# Patient Record
Sex: FEMALE | Race: WHITE | Employment: OTHER | URBAN - METROPOLITAN AREA
[De-identification: names, ages, dates, MRNs, and addresses within clinical notes are randomized per-mention and may not be internally consistent; named-entity substitution may affect disease eponyms.]

---

## 2017-01-17 RX ORDER — ALPRAZOLAM 0.5 MG/1
TABLET ORAL
Qty: 270 TABLET | Refills: 0 | OUTPATIENT
Start: 2017-01-17 | End: 2017-04-25 | Stop reason: SDUPTHER

## 2017-01-24 ENCOUNTER — OFFICE VISIT (OUTPATIENT)
Dept: FAMILY MEDICINE CLINIC | Age: 77
End: 2017-01-24

## 2017-01-24 VITALS
BODY MASS INDEX: 22.63 KG/M2 | HEIGHT: 62 IN | SYSTOLIC BLOOD PRESSURE: 116 MMHG | RESPIRATION RATE: 16 BRPM | DIASTOLIC BLOOD PRESSURE: 78 MMHG | WEIGHT: 123 LBS | TEMPERATURE: 98.1 F | HEART RATE: 58 BPM

## 2017-01-24 DIAGNOSIS — E55.9 VITAMIN D DEFICIENCY: ICD-10-CM

## 2017-01-24 DIAGNOSIS — Z12.11 COLON CANCER SCREENING: ICD-10-CM

## 2017-01-24 DIAGNOSIS — I25.10 CORONARY ARTERY DISEASE INVOLVING NATIVE CORONARY ARTERY OF NATIVE HEART WITHOUT ANGINA PECTORIS: Primary | ICD-10-CM

## 2017-01-24 DIAGNOSIS — R73.9 HYPERGLYCEMIA: ICD-10-CM

## 2017-01-24 DIAGNOSIS — Z72.0 TOBACCO ABUSE: ICD-10-CM

## 2017-01-24 DIAGNOSIS — E78.00 HYPERCHOLESTEROLEMIA: ICD-10-CM

## 2017-01-24 DIAGNOSIS — F41.9 ANXIETY: ICD-10-CM

## 2017-01-24 DIAGNOSIS — I10 ESSENTIAL HYPERTENSION: ICD-10-CM

## 2017-01-24 PROCEDURE — 4040F PNEUMOC VAC/ADMIN/RCVD: CPT | Performed by: FAMILY MEDICINE

## 2017-01-24 PROCEDURE — 3017F COLORECTAL CA SCREEN DOC REV: CPT | Performed by: FAMILY MEDICINE

## 2017-01-24 PROCEDURE — 1090F PRES/ABSN URINE INCON ASSESS: CPT | Performed by: FAMILY MEDICINE

## 2017-01-24 PROCEDURE — G8419 CALC BMI OUT NRM PARAM NOF/U: HCPCS | Performed by: FAMILY MEDICINE

## 2017-01-24 PROCEDURE — 1123F ACP DISCUSS/DSCN MKR DOCD: CPT | Performed by: FAMILY MEDICINE

## 2017-01-24 PROCEDURE — G8399 PT W/DXA RESULTS DOCUMENT: HCPCS | Performed by: FAMILY MEDICINE

## 2017-01-24 PROCEDURE — G8599 NO ASA/ANTIPLAT THER USE RNG: HCPCS | Performed by: FAMILY MEDICINE

## 2017-01-24 PROCEDURE — 99214 OFFICE O/P EST MOD 30 MIN: CPT | Performed by: FAMILY MEDICINE

## 2017-01-24 PROCEDURE — G8484 FLU IMMUNIZE NO ADMIN: HCPCS | Performed by: FAMILY MEDICINE

## 2017-01-24 PROCEDURE — G8427 DOCREV CUR MEDS BY ELIG CLIN: HCPCS | Performed by: FAMILY MEDICINE

## 2017-01-24 PROCEDURE — 4004F PT TOBACCO SCREEN RCVD TLK: CPT | Performed by: FAMILY MEDICINE

## 2017-01-24 RX ORDER — PAROXETINE HYDROCHLORIDE 40 MG/1
TABLET, FILM COATED ORAL
COMMUNITY
Start: 2017-01-07 | End: 2017-04-25 | Stop reason: SDUPTHER

## 2017-01-24 ASSESSMENT — PATIENT HEALTH QUESTIONNAIRE - PHQ9
2. FEELING DOWN, DEPRESSED OR HOPELESS: 0
SUM OF ALL RESPONSES TO PHQ QUESTIONS 1-9: 0
1. LITTLE INTEREST OR PLEASURE IN DOING THINGS: 0
SUM OF ALL RESPONSES TO PHQ9 QUESTIONS 1 & 2: 0

## 2017-02-07 DIAGNOSIS — R73.9 HYPERGLYCEMIA: ICD-10-CM

## 2017-02-07 LAB — HBA1C MFR BLD: 6.3 % (ref 4.8–5.9)

## 2017-04-10 RX ORDER — METHOCARBAMOL 750 MG/1
TABLET ORAL
Qty: 12 CAPSULE | Refills: 3 | Status: SHIPPED | OUTPATIENT
Start: 2017-04-10 | End: 2018-04-20 | Stop reason: SDUPTHER

## 2017-04-25 ENCOUNTER — OFFICE VISIT (OUTPATIENT)
Dept: FAMILY MEDICINE CLINIC | Age: 77
End: 2017-04-25

## 2017-04-25 VITALS
RESPIRATION RATE: 18 BRPM | BODY MASS INDEX: 23.3 KG/M2 | HEIGHT: 62 IN | DIASTOLIC BLOOD PRESSURE: 72 MMHG | TEMPERATURE: 97.7 F | HEART RATE: 56 BPM | SYSTOLIC BLOOD PRESSURE: 120 MMHG | WEIGHT: 126.6 LBS

## 2017-04-25 DIAGNOSIS — E78.00 HYPERCHOLESTEROLEMIA: ICD-10-CM

## 2017-04-25 DIAGNOSIS — Z72.0 TOBACCO ABUSE: ICD-10-CM

## 2017-04-25 DIAGNOSIS — R73.9 HYPERGLYCEMIA: ICD-10-CM

## 2017-04-25 DIAGNOSIS — I10 ESSENTIAL HYPERTENSION: Primary | ICD-10-CM

## 2017-04-25 DIAGNOSIS — Z12.11 SCREENING FOR MALIGNANT NEOPLASM OF COLON: ICD-10-CM

## 2017-04-25 DIAGNOSIS — F41.9 ANXIETY: ICD-10-CM

## 2017-04-25 DIAGNOSIS — I25.10 CORONARY ARTERY DISEASE INVOLVING NATIVE CORONARY ARTERY OF NATIVE HEART WITHOUT ANGINA PECTORIS: ICD-10-CM

## 2017-04-25 DIAGNOSIS — E55.9 VITAMIN D DEFICIENCY: ICD-10-CM

## 2017-04-25 PROCEDURE — 4004F PT TOBACCO SCREEN RCVD TLK: CPT | Performed by: FAMILY MEDICINE

## 2017-04-25 PROCEDURE — 4040F PNEUMOC VAC/ADMIN/RCVD: CPT | Performed by: FAMILY MEDICINE

## 2017-04-25 PROCEDURE — G8420 CALC BMI NORM PARAMETERS: HCPCS | Performed by: FAMILY MEDICINE

## 2017-04-25 PROCEDURE — 1090F PRES/ABSN URINE INCON ASSESS: CPT | Performed by: FAMILY MEDICINE

## 2017-04-25 PROCEDURE — 1123F ACP DISCUSS/DSCN MKR DOCD: CPT | Performed by: FAMILY MEDICINE

## 2017-04-25 PROCEDURE — G8399 PT W/DXA RESULTS DOCUMENT: HCPCS | Performed by: FAMILY MEDICINE

## 2017-04-25 PROCEDURE — 3017F COLORECTAL CA SCREEN DOC REV: CPT | Performed by: FAMILY MEDICINE

## 2017-04-25 PROCEDURE — G8599 NO ASA/ANTIPLAT THER USE RNG: HCPCS | Performed by: FAMILY MEDICINE

## 2017-04-25 PROCEDURE — G8427 DOCREV CUR MEDS BY ELIG CLIN: HCPCS | Performed by: FAMILY MEDICINE

## 2017-04-25 PROCEDURE — 99214 OFFICE O/P EST MOD 30 MIN: CPT | Performed by: FAMILY MEDICINE

## 2017-04-25 RX ORDER — PAROXETINE HYDROCHLORIDE 40 MG/1
40 TABLET, FILM COATED ORAL EVERY MORNING
Qty: 90 TABLET | Refills: 0 | Status: SHIPPED | OUTPATIENT
Start: 2017-04-25 | End: 2017-10-20 | Stop reason: SDUPTHER

## 2017-04-25 RX ORDER — ALPRAZOLAM 0.5 MG/1
0.5 TABLET ORAL 3 TIMES DAILY PRN
Qty: 270 TABLET | Refills: 0 | Status: SHIPPED | OUTPATIENT
Start: 2017-04-25 | End: 2017-07-20 | Stop reason: SDUPTHER

## 2017-04-26 RX ORDER — ALPRAZOLAM 0.5 MG/1
TABLET ORAL
Qty: 270 TABLET | Refills: 0 | OUTPATIENT
Start: 2017-04-26

## 2017-05-02 LAB
CONTROL: ABNORMAL
HEMOCCULT STL QL: POSITIVE

## 2017-05-18 ENCOUNTER — OFFICE VISIT (OUTPATIENT)
Dept: FAMILY MEDICINE CLINIC | Age: 77
End: 2017-05-18

## 2017-05-18 VITALS
RESPIRATION RATE: 16 BRPM | SYSTOLIC BLOOD PRESSURE: 118 MMHG | WEIGHT: 121.2 LBS | HEART RATE: 74 BPM | TEMPERATURE: 97.5 F | HEIGHT: 62 IN | BODY MASS INDEX: 22.31 KG/M2 | DIASTOLIC BLOOD PRESSURE: 68 MMHG

## 2017-05-18 DIAGNOSIS — I10 ESSENTIAL HYPERTENSION: ICD-10-CM

## 2017-05-18 DIAGNOSIS — Z72.0 TOBACCO ABUSE: ICD-10-CM

## 2017-05-18 DIAGNOSIS — F41.9 ANXIETY: ICD-10-CM

## 2017-05-18 DIAGNOSIS — E78.00 HYPERCHOLESTEROLEMIA: ICD-10-CM

## 2017-05-18 DIAGNOSIS — I25.10 CORONARY ARTERY DISEASE INVOLVING NATIVE CORONARY ARTERY OF NATIVE HEART WITHOUT ANGINA PECTORIS: ICD-10-CM

## 2017-05-18 DIAGNOSIS — K92.2 GASTROINTESTINAL HEMORRHAGE, UNSPECIFIED GASTROINTESTINAL HEMORRHAGE TYPE: Primary | ICD-10-CM

## 2017-05-18 DIAGNOSIS — R73.9 HYPERGLYCEMIA: ICD-10-CM

## 2017-05-18 DIAGNOSIS — E55.9 VITAMIN D DEFICIENCY: ICD-10-CM

## 2017-05-18 PROCEDURE — 4004F PT TOBACCO SCREEN RCVD TLK: CPT | Performed by: FAMILY MEDICINE

## 2017-05-18 PROCEDURE — 1090F PRES/ABSN URINE INCON ASSESS: CPT | Performed by: FAMILY MEDICINE

## 2017-05-18 PROCEDURE — 99214 OFFICE O/P EST MOD 30 MIN: CPT | Performed by: FAMILY MEDICINE

## 2017-05-18 PROCEDURE — 3017F COLORECTAL CA SCREEN DOC REV: CPT | Performed by: FAMILY MEDICINE

## 2017-05-18 PROCEDURE — G8419 CALC BMI OUT NRM PARAM NOF/U: HCPCS | Performed by: FAMILY MEDICINE

## 2017-05-18 PROCEDURE — G8599 NO ASA/ANTIPLAT THER USE RNG: HCPCS | Performed by: FAMILY MEDICINE

## 2017-05-18 PROCEDURE — G8427 DOCREV CUR MEDS BY ELIG CLIN: HCPCS | Performed by: FAMILY MEDICINE

## 2017-05-18 PROCEDURE — G8399 PT W/DXA RESULTS DOCUMENT: HCPCS | Performed by: FAMILY MEDICINE

## 2017-05-18 PROCEDURE — 1123F ACP DISCUSS/DSCN MKR DOCD: CPT | Performed by: FAMILY MEDICINE

## 2017-05-18 PROCEDURE — 4040F PNEUMOC VAC/ADMIN/RCVD: CPT | Performed by: FAMILY MEDICINE

## 2017-05-18 RX ORDER — LOSARTAN POTASSIUM 25 MG/1
1 TABLET ORAL DAILY
COMMUNITY
Start: 2017-05-16 | End: 2018-05-08 | Stop reason: SDUPTHER

## 2017-06-08 ENCOUNTER — ANESTHESIA EVENT (OUTPATIENT)
Dept: ENDOSCOPY | Age: 77
End: 2017-06-08
Payer: MEDICARE

## 2017-06-08 ENCOUNTER — ANESTHESIA (OUTPATIENT)
Dept: ENDOSCOPY | Age: 77
End: 2017-06-08
Payer: MEDICARE

## 2017-06-08 ENCOUNTER — HOSPITAL ENCOUNTER (OUTPATIENT)
Age: 77
Setting detail: OUTPATIENT SURGERY
Discharge: HOME | End: 2017-06-08
Attending: SPECIALIST | Admitting: SPECIALIST
Payer: MEDICARE

## 2017-06-08 VITALS
DIASTOLIC BLOOD PRESSURE: 78 MMHG | OXYGEN SATURATION: 93 % | BODY MASS INDEX: 23.98 KG/M2 | WEIGHT: 127 LBS | TEMPERATURE: 98 F | SYSTOLIC BLOOD PRESSURE: 131 MMHG | HEIGHT: 61 IN | RESPIRATION RATE: 18 BRPM | HEART RATE: 79 BPM

## 2017-06-08 VITALS
RESPIRATION RATE: 19 BRPM | SYSTOLIC BLOOD PRESSURE: 119 MMHG | OXYGEN SATURATION: 91 % | DIASTOLIC BLOOD PRESSURE: 59 MMHG

## 2017-06-08 PROCEDURE — 6360000002 HC RX W HCPCS: Performed by: NURSE ANESTHETIST, CERTIFIED REGISTERED

## 2017-06-08 PROCEDURE — 88305 TISSUE EXAM BY PATHOLOGIST: CPT

## 2017-06-08 PROCEDURE — 2500000003 HC RX 250 WO HCPCS: Performed by: NURSE ANESTHETIST, CERTIFIED REGISTERED

## 2017-06-08 PROCEDURE — 2580000003 HC RX 258: Performed by: SPECIALIST

## 2017-06-08 PROCEDURE — 3700000001 HC ADD 15 MINUTES (ANESTHESIA): Performed by: SPECIALIST

## 2017-06-08 PROCEDURE — 88342 IMHCHEM/IMCYTCHM 1ST ANTB: CPT

## 2017-06-08 PROCEDURE — 3609027000 HC COLONOSCOPY: Performed by: SPECIALIST

## 2017-06-08 PROCEDURE — 3609017100 HC EGD: Performed by: SPECIALIST

## 2017-06-08 PROCEDURE — 3700000000 HC ANESTHESIA ATTENDED CARE: Performed by: SPECIALIST

## 2017-06-08 PROCEDURE — 7100000011 HC PHASE II RECOVERY - ADDTL 15 MIN: Performed by: SPECIALIST

## 2017-06-08 PROCEDURE — 7100000010 HC PHASE II RECOVERY - FIRST 15 MIN: Performed by: SPECIALIST

## 2017-06-08 RX ORDER — SODIUM CHLORIDE 0.9 % (FLUSH) 0.9 %
10 SYRINGE (ML) INJECTION EVERY 12 HOURS SCHEDULED
Status: DISCONTINUED | OUTPATIENT
Start: 2017-06-08 | End: 2017-06-08 | Stop reason: HOSPADM

## 2017-06-08 RX ORDER — ONDANSETRON 2 MG/ML
4 INJECTION INTRAMUSCULAR; INTRAVENOUS
Status: DISCONTINUED | OUTPATIENT
Start: 2017-06-08 | End: 2017-06-08 | Stop reason: HOSPADM

## 2017-06-08 RX ORDER — GLYCOPYRROLATE 0.2 MG/ML
INJECTION INTRAMUSCULAR; INTRAVENOUS PRN
Status: DISCONTINUED | OUTPATIENT
Start: 2017-06-08 | End: 2017-06-08 | Stop reason: SDUPTHER

## 2017-06-08 RX ORDER — SODIUM CHLORIDE 0.9 % (FLUSH) 0.9 %
10 SYRINGE (ML) INJECTION PRN
Status: DISCONTINUED | OUTPATIENT
Start: 2017-06-08 | End: 2017-06-08 | Stop reason: HOSPADM

## 2017-06-08 RX ORDER — LIDOCAINE HYDROCHLORIDE 10 MG/ML
INJECTION, SOLUTION INFILTRATION; PERINEURAL PRN
Status: DISCONTINUED | OUTPATIENT
Start: 2017-06-08 | End: 2017-06-08 | Stop reason: SDUPTHER

## 2017-06-08 RX ORDER — PROPOFOL 10 MG/ML
INJECTION, EMULSION INTRAVENOUS PRN
Status: DISCONTINUED | OUTPATIENT
Start: 2017-06-08 | End: 2017-06-08 | Stop reason: SDUPTHER

## 2017-06-08 RX ORDER — SODIUM CHLORIDE 9 MG/ML
INJECTION, SOLUTION INTRAVENOUS CONTINUOUS
Status: DISCONTINUED | OUTPATIENT
Start: 2017-06-08 | End: 2017-06-08 | Stop reason: HOSPADM

## 2017-06-08 RX ORDER — LIDOCAINE HYDROCHLORIDE 10 MG/ML
1 INJECTION, SOLUTION EPIDURAL; INFILTRATION; INTRACAUDAL; PERINEURAL
Status: DISCONTINUED | OUTPATIENT
Start: 2017-06-08 | End: 2017-06-08 | Stop reason: HOSPADM

## 2017-06-08 RX ADMIN — SODIUM CHLORIDE: 9 INJECTION, SOLUTION INTRAVENOUS at 10:31

## 2017-06-08 RX ADMIN — PROPOFOL 10 MG: 10 INJECTION, EMULSION INTRAVENOUS at 11:02

## 2017-06-08 RX ADMIN — LIDOCAINE HYDROCHLORIDE 20 MG: 10 INJECTION, SOLUTION INFILTRATION; PERINEURAL at 10:41

## 2017-06-08 RX ADMIN — PROPOFOL 20 MG: 10 INJECTION, EMULSION INTRAVENOUS at 10:47

## 2017-06-08 RX ADMIN — PROPOFOL 20 MG: 10 INJECTION, EMULSION INTRAVENOUS at 10:49

## 2017-06-08 RX ADMIN — PROPOFOL 10 MG: 10 INJECTION, EMULSION INTRAVENOUS at 10:58

## 2017-06-08 RX ADMIN — GLYCOPYRROLATE 0.2 MG: 0.2 INJECTION INTRAMUSCULAR; INTRAVENOUS at 10:53

## 2017-06-08 RX ADMIN — PROPOFOL 10 MG: 10 INJECTION, EMULSION INTRAVENOUS at 11:05

## 2017-06-08 RX ADMIN — PROPOFOL 20 MG: 10 INJECTION, EMULSION INTRAVENOUS at 10:43

## 2017-06-08 RX ADMIN — Medication 0.1 MG: at 10:55

## 2017-06-08 RX ADMIN — PROPOFOL 20 MG: 10 INJECTION, EMULSION INTRAVENOUS at 10:45

## 2017-06-08 RX ADMIN — PROPOFOL 30 MG: 10 INJECTION, EMULSION INTRAVENOUS at 10:41

## 2017-06-08 RX ADMIN — PROPOFOL 10 MG: 10 INJECTION, EMULSION INTRAVENOUS at 10:53

## 2017-06-08 RX ADMIN — Medication 0.1 MG: at 10:50

## 2017-06-08 RX ADMIN — PROPOFOL 20 MG: 10 INJECTION, EMULSION INTRAVENOUS at 10:51

## 2017-07-03 ENCOUNTER — TELEPHONE (OUTPATIENT)
Dept: FAMILY MEDICINE CLINIC | Age: 77
End: 2017-07-03

## 2017-07-20 ENCOUNTER — OFFICE VISIT (OUTPATIENT)
Dept: FAMILY MEDICINE CLINIC | Age: 77
End: 2017-07-20

## 2017-07-20 VITALS
WEIGHT: 121 LBS | RESPIRATION RATE: 14 BRPM | DIASTOLIC BLOOD PRESSURE: 72 MMHG | HEIGHT: 61 IN | SYSTOLIC BLOOD PRESSURE: 130 MMHG | BODY MASS INDEX: 22.84 KG/M2 | HEART RATE: 60 BPM | TEMPERATURE: 97.4 F

## 2017-07-20 DIAGNOSIS — M79.10 MYALGIA: Primary | ICD-10-CM

## 2017-07-20 DIAGNOSIS — M25.50 ARTHRALGIA, UNSPECIFIED JOINT: ICD-10-CM

## 2017-07-20 DIAGNOSIS — E78.00 HYPERCHOLESTEROLEMIA: ICD-10-CM

## 2017-07-20 DIAGNOSIS — R53.83 OTHER MALAISE AND FATIGUE: ICD-10-CM

## 2017-07-20 DIAGNOSIS — I25.10 CORONARY ARTERY DISEASE INVOLVING NATIVE CORONARY ARTERY OF NATIVE HEART WITHOUT ANGINA PECTORIS: ICD-10-CM

## 2017-07-20 DIAGNOSIS — F41.9 ANXIETY: ICD-10-CM

## 2017-07-20 DIAGNOSIS — E55.9 VITAMIN D DEFICIENCY: ICD-10-CM

## 2017-07-20 DIAGNOSIS — Z72.0 TOBACCO ABUSE: ICD-10-CM

## 2017-07-20 DIAGNOSIS — M79.10 MYALGIA: ICD-10-CM

## 2017-07-20 DIAGNOSIS — I10 ESSENTIAL HYPERTENSION: ICD-10-CM

## 2017-07-20 DIAGNOSIS — R53.81 OTHER MALAISE AND FATIGUE: ICD-10-CM

## 2017-07-20 LAB
ALBUMIN SERPL-MCNC: 4.8 G/DL (ref 3.9–4.9)
ALP BLD-CCNC: 90 U/L (ref 40–130)
ALT SERPL-CCNC: 28 U/L (ref 0–33)
ANION GAP SERPL CALCULATED.3IONS-SCNC: 16 MEQ/L (ref 7–13)
ANTISTREPTOLYSIN-O: 34 IU/ML (ref 0–200)
AST SERPL-CCNC: 25 U/L (ref 0–35)
BILIRUB SERPL-MCNC: 0.5 MG/DL (ref 0–1.2)
BUN BLDV-MCNC: 18 MG/DL (ref 8–23)
C-REACTIVE PROTEIN: 2.4 MG/L (ref 0–5)
CALCIUM SERPL-MCNC: 10.1 MG/DL (ref 8.6–10.2)
CHLORIDE BLD-SCNC: 101 MEQ/L (ref 98–107)
CO2: 22 MEQ/L (ref 22–29)
CREAT SERPL-MCNC: 0.79 MG/DL (ref 0.5–0.9)
GFR AFRICAN AMERICAN: >60
GFR NON-AFRICAN AMERICAN: >60
GLOBULIN: 2.7 G/DL (ref 2.3–3.5)
GLUCOSE BLD-MCNC: 118 MG/DL (ref 74–109)
POTASSIUM SERPL-SCNC: 4.9 MEQ/L (ref 3.5–5.1)
RHEUMATOID FACTOR: <10 IU/ML (ref 0–14)
SODIUM BLD-SCNC: 139 MEQ/L (ref 132–144)
TOTAL PROTEIN: 7.5 G/DL (ref 6.4–8.1)
TSH REFLEX: 0.59 UIU/ML (ref 0.27–4.2)
URIC ACID, SERUM: 6 MG/DL (ref 2.4–5.7)

## 2017-07-20 PROCEDURE — G8420 CALC BMI NORM PARAMETERS: HCPCS | Performed by: FAMILY MEDICINE

## 2017-07-20 PROCEDURE — 4040F PNEUMOC VAC/ADMIN/RCVD: CPT | Performed by: FAMILY MEDICINE

## 2017-07-20 PROCEDURE — 1123F ACP DISCUSS/DSCN MKR DOCD: CPT | Performed by: FAMILY MEDICINE

## 2017-07-20 PROCEDURE — G8427 DOCREV CUR MEDS BY ELIG CLIN: HCPCS | Performed by: FAMILY MEDICINE

## 2017-07-20 PROCEDURE — 99214 OFFICE O/P EST MOD 30 MIN: CPT | Performed by: FAMILY MEDICINE

## 2017-07-20 PROCEDURE — 1090F PRES/ABSN URINE INCON ASSESS: CPT | Performed by: FAMILY MEDICINE

## 2017-07-20 PROCEDURE — G8399 PT W/DXA RESULTS DOCUMENT: HCPCS | Performed by: FAMILY MEDICINE

## 2017-07-20 PROCEDURE — G8599 NO ASA/ANTIPLAT THER USE RNG: HCPCS | Performed by: FAMILY MEDICINE

## 2017-07-20 PROCEDURE — 3017F COLORECTAL CA SCREEN DOC REV: CPT | Performed by: FAMILY MEDICINE

## 2017-07-20 PROCEDURE — 4004F PT TOBACCO SCREEN RCVD TLK: CPT | Performed by: FAMILY MEDICINE

## 2017-07-20 RX ORDER — ALPRAZOLAM 0.5 MG/1
0.5 TABLET ORAL 3 TIMES DAILY PRN
Qty: 270 TABLET | Refills: 0 | Status: SHIPPED | OUTPATIENT
Start: 2017-07-20 | End: 2017-10-20 | Stop reason: SDUPTHER

## 2017-07-20 RX ORDER — PANTOPRAZOLE SODIUM 40 MG/1
40 TABLET, DELAYED RELEASE ORAL DAILY
COMMUNITY
End: 2019-04-01 | Stop reason: SDUPTHER

## 2017-07-21 LAB
ANA INTERPRETATION: ABNORMAL
ANA PATTERN: ABNORMAL
ANA TITER: ABNORMAL
ANTI-NUCLEAR ANTIBODY (ANA): POSITIVE

## 2017-07-24 RX ORDER — SIMVASTATIN 20 MG
TABLET ORAL
Qty: 90 TABLET | Refills: 1 | Status: SHIPPED | OUTPATIENT
Start: 2017-07-24 | End: 2017-10-11 | Stop reason: SDUPTHER

## 2017-07-28 DIAGNOSIS — R76.8 POSITIVE ANA (ANTINUCLEAR ANTIBODY): Primary | ICD-10-CM

## 2017-07-28 DIAGNOSIS — E79.0 ELEVATED URIC ACID IN BLOOD: ICD-10-CM

## 2017-08-02 ENCOUNTER — TELEPHONE (OUTPATIENT)
Dept: FAMILY MEDICINE CLINIC | Age: 77
End: 2017-08-02

## 2017-10-11 RX ORDER — SIMVASTATIN 20 MG
TABLET ORAL
Qty: 90 TABLET | Refills: 1 | Status: SHIPPED | OUTPATIENT
Start: 2017-10-11 | End: 2018-04-04 | Stop reason: SDUPTHER

## 2017-10-20 ENCOUNTER — OFFICE VISIT (OUTPATIENT)
Dept: FAMILY MEDICINE CLINIC | Age: 77
End: 2017-10-20

## 2017-10-20 VITALS
RESPIRATION RATE: 16 BRPM | OXYGEN SATURATION: 95 % | HEIGHT: 61 IN | TEMPERATURE: 97 F | HEART RATE: 66 BPM | DIASTOLIC BLOOD PRESSURE: 62 MMHG | WEIGHT: 125.6 LBS | SYSTOLIC BLOOD PRESSURE: 122 MMHG | BODY MASS INDEX: 23.71 KG/M2

## 2017-10-20 DIAGNOSIS — I10 ESSENTIAL HYPERTENSION: ICD-10-CM

## 2017-10-20 DIAGNOSIS — I25.10 CORONARY ARTERY DISEASE INVOLVING NATIVE CORONARY ARTERY OF NATIVE HEART WITHOUT ANGINA PECTORIS: Primary | ICD-10-CM

## 2017-10-20 DIAGNOSIS — Z72.0 TOBACCO ABUSE: ICD-10-CM

## 2017-10-20 DIAGNOSIS — E78.00 HYPERCHOLESTEROLEMIA: ICD-10-CM

## 2017-10-20 DIAGNOSIS — R73.9 HYPERGLYCEMIA: ICD-10-CM

## 2017-10-20 DIAGNOSIS — F41.9 ANXIETY: ICD-10-CM

## 2017-10-20 DIAGNOSIS — E55.9 VITAMIN D DEFICIENCY: ICD-10-CM

## 2017-10-20 PROCEDURE — 1090F PRES/ABSN URINE INCON ASSESS: CPT | Performed by: FAMILY MEDICINE

## 2017-10-20 PROCEDURE — G8399 PT W/DXA RESULTS DOCUMENT: HCPCS | Performed by: FAMILY MEDICINE

## 2017-10-20 PROCEDURE — 1123F ACP DISCUSS/DSCN MKR DOCD: CPT | Performed by: FAMILY MEDICINE

## 2017-10-20 PROCEDURE — G8420 CALC BMI NORM PARAMETERS: HCPCS | Performed by: FAMILY MEDICINE

## 2017-10-20 PROCEDURE — 99214 OFFICE O/P EST MOD 30 MIN: CPT | Performed by: FAMILY MEDICINE

## 2017-10-20 PROCEDURE — G8427 DOCREV CUR MEDS BY ELIG CLIN: HCPCS | Performed by: FAMILY MEDICINE

## 2017-10-20 PROCEDURE — G8599 NO ASA/ANTIPLAT THER USE RNG: HCPCS | Performed by: FAMILY MEDICINE

## 2017-10-20 PROCEDURE — 4040F PNEUMOC VAC/ADMIN/RCVD: CPT | Performed by: FAMILY MEDICINE

## 2017-10-20 PROCEDURE — G8484 FLU IMMUNIZE NO ADMIN: HCPCS | Performed by: FAMILY MEDICINE

## 2017-10-20 PROCEDURE — 4004F PT TOBACCO SCREEN RCVD TLK: CPT | Performed by: FAMILY MEDICINE

## 2017-10-20 RX ORDER — ALPRAZOLAM 0.5 MG/1
0.5 TABLET ORAL 3 TIMES DAILY PRN
Qty: 270 TABLET | Refills: 0 | Status: SHIPPED | OUTPATIENT
Start: 2017-10-20 | End: 2018-01-09 | Stop reason: SDUPTHER

## 2017-10-20 RX ORDER — PAROXETINE HYDROCHLORIDE 40 MG/1
40 TABLET, FILM COATED ORAL EVERY MORNING
Qty: 90 TABLET | Refills: 0 | Status: SHIPPED | OUTPATIENT
Start: 2017-10-20 | End: 2018-01-02 | Stop reason: SDUPTHER

## 2017-10-20 RX ORDER — DORZOLAMIDE HYDROCHLORIDE AND TIMOLOL MALEATE 20; 5 MG/ML; MG/ML
SOLUTION/ DROPS OPHTHALMIC
COMMUNITY
Start: 2017-10-10

## 2017-10-20 NOTE — PROGRESS NOTES
Chief Complaint   Patient presents with    Check-Up    Medication Refill   +fit tests had gi eval  was ok colon wise  +h pylori    myalgia and arthralgia and gums are continuing  feet/ankles  rec bw    some cardiac  hypertrophy has been identified  sees cardio  had echo    htn relatively stable  No cp/sob  No angina    Cad condition stable  No angina    tob abuse continues  Must quit  She declines    Anxiety  No suicidal/homocidal ideation. No psychotic or manic symptoms. Patient presents for exam.      Patient Active Problem List   Diagnosis    Hypercholesterolemia    Anxiety    Tobacco abuse    Vitamin D deficiency    Essential hypertension    Coronary artery disease involving native coronary artery of native heart without angina pectoris    Hyperglycemia    Glaucoma of both eyes       has a current medication list which includes the following prescription(s): dorzolamide-timolol, paroxetine, alprazolam, simvastatin, pantoprazole, losartan, d3-50, lumigan, and amlodipine. Past Medical History:   Diagnosis Date    CAD (coronary artery disease)     Glaucoma     History of mammogram 2011    CATEGORY 2    Hypercholesterolemia     Hyperlipidemia     Hypertension        Past Surgical History:   Procedure Laterality Date    ANGIOPLASTY      June 2017    HYSTERECTOMY  1977    AK COLON CA SCRN NOT  [address]  IND N/A 6/8/2017    COLONOSCOPY performed by Martin Meckel, MD at [address] [facility] ESOPHAGOGASTRODUODENOSCOPY TRANSORAL DIAGNOSTIC N/A 6/8/2017    EGD ESOPHAGOGASTRODUODENOSCOPY performed by Martin Meckel, MD at [address] [facility],[address] SALPINGO-OOPHORECTOMY      LEFT       [unfilled]    family history includes Cancer in her father and another family member; Hiral Purchase in her brother and sister; High Blood Pressure in an other family member.     Social History     Social History    Marital status: Single     Spouse name: N/A    Number of children: N/A    Years of education: N/A Occupational History    Not on file. Social History Main Topics    Smoking status: Current Every Day Smoker     Packs/day: 0.50     Years: 50.00     Types: Cigarettes    Smokeless tobacco: Never Used    Alcohol use No    Drug use: No    Sexual activity: Not on file     Other Topics Concern    Not on file     Social History Narrative    No narrative on file       Allergies   Allergen Reactions    Antihistamine [Diphenhydramine Hcl]     Decongestant [Albertsons Di Bromm]        Review of Systems - General ROS: negative  Psychological ROS: negative  ENT ROS: negative  Hematological and Lymphatic ROS: negative  Endocrine ROS: negative  Respiratory ROS: no cough, shortness of breath, or wheezing  Cardiovascular ROS: no chest pain or dyspnea on exertion  Gastrointestinal ROS: no abdominal pain, change in bowel habits, or black or bloody stools  Genito-Urinary ROS: no dysuria, trouble voiding, or hematuria  Musculoskeletal ROS: negative  Neurological ROS: no TIA or stroke symptoms  Dermatological ROS: negative    Blood pressure 122/62, pulse 66, temperature 97 °F (36.1 °C), temperature source Temporal, resp. rate 16, height 5' 1\" (1.549 m), weight 125 lb 9.6 oz (57 kg), SpO2 95 %, not currently breastfeeding.     Physical Examination: General appearance - alert, well appearing, and in no distress  Mental status - alert, oriented to person, place, and time  Eyes - pupils equal and reactive, extraocular eye movements intact  Ears - bilateral TM's and external ear canals normal  Mouth - mucous membranes moist, pharynx normal without lesions  Neck - supple, no significant adenopathy  Lymphatics - no palpable lymphadenopathy, no hepatosplenomegaly  Chest - clear to auscultation, no wheezes, rales or rhonchi, symmetric air entry  Heart - normal rate, regular rhythm, normal S1, S2, no murmurs, rubs, clicks or gallops  Abdomen - soft, nontender, nondistended, no masses or organomegaly  Neurological - alert,

## 2018-01-02 NOTE — TELEPHONE ENCOUNTER
Pharmacy requesting refill    Medication pended. Last Ov: 10/20/17  Last Rx: 10/20/17    Please approve or deny.     Future Appointments  Date Time Provider Maritza Pineda   1/19/2018 10:00 AM Lorena Cedillo  [address]

## 2018-01-04 RX ORDER — PAROXETINE HYDROCHLORIDE 40 MG/1
40 TABLET, FILM COATED ORAL EVERY MORNING
Qty: 90 TABLET | Refills: 1 | Status: SHIPPED | OUTPATIENT
Start: 2018-01-04 | End: 2018-04-20 | Stop reason: SDUPTHER

## 2018-01-09 ENCOUNTER — TELEPHONE (OUTPATIENT)
Dept: FAMILY MEDICINE CLINIC | Age: 78
End: 2018-01-09

## 2018-01-09 DIAGNOSIS — F41.9 ANXIETY: Primary | ICD-10-CM

## 2018-01-09 RX ORDER — ALPRAZOLAM 0.5 MG/1
0.5 TABLET ORAL 3 TIMES DAILY PRN
Qty: 270 TABLET | Refills: 0 | Status: SHIPPED | OUTPATIENT
Start: 2018-01-09 | End: 2018-04-20 | Stop reason: SDUPTHER

## 2018-01-09 NOTE — TELEPHONE ENCOUNTER
Pharmacy REQUESTING REFILL. ORDER PENDED.  THANK YOU.    LOV-10/20/2017  Last refill-10/26/2017    Future Appointments  Date Time Provider Maritza Mckinleyi   1/19/2018 10:00 AM Wanda Lance  [address]

## 2018-01-19 ENCOUNTER — OFFICE VISIT (OUTPATIENT)
Dept: FAMILY MEDICINE CLINIC | Age: 78
End: 2018-01-19

## 2018-01-19 VITALS
TEMPERATURE: 97.8 F | RESPIRATION RATE: 16 BRPM | BODY MASS INDEX: 23.41 KG/M2 | HEART RATE: 61 BPM | DIASTOLIC BLOOD PRESSURE: 72 MMHG | HEIGHT: 61 IN | OXYGEN SATURATION: 93 % | SYSTOLIC BLOOD PRESSURE: 132 MMHG | WEIGHT: 124 LBS

## 2018-01-19 DIAGNOSIS — I25.10 CORONARY ARTERY DISEASE INVOLVING NATIVE CORONARY ARTERY OF NATIVE HEART WITHOUT ANGINA PECTORIS: ICD-10-CM

## 2018-01-19 DIAGNOSIS — R73.9 HYPERGLYCEMIA: ICD-10-CM

## 2018-01-19 DIAGNOSIS — E55.9 VITAMIN D DEFICIENCY: ICD-10-CM

## 2018-01-19 DIAGNOSIS — E78.00 HYPERCHOLESTEROLEMIA: ICD-10-CM

## 2018-01-19 DIAGNOSIS — Z72.0 TOBACCO ABUSE: ICD-10-CM

## 2018-01-19 DIAGNOSIS — F41.9 ANXIETY: Primary | ICD-10-CM

## 2018-01-19 DIAGNOSIS — I10 ESSENTIAL HYPERTENSION: ICD-10-CM

## 2018-01-19 PROCEDURE — G8427 DOCREV CUR MEDS BY ELIG CLIN: HCPCS | Performed by: FAMILY MEDICINE

## 2018-01-19 PROCEDURE — G8399 PT W/DXA RESULTS DOCUMENT: HCPCS | Performed by: FAMILY MEDICINE

## 2018-01-19 PROCEDURE — 1090F PRES/ABSN URINE INCON ASSESS: CPT | Performed by: FAMILY MEDICINE

## 2018-01-19 PROCEDURE — G8484 FLU IMMUNIZE NO ADMIN: HCPCS | Performed by: FAMILY MEDICINE

## 2018-01-19 PROCEDURE — G8420 CALC BMI NORM PARAMETERS: HCPCS | Performed by: FAMILY MEDICINE

## 2018-01-19 PROCEDURE — 1123F ACP DISCUSS/DSCN MKR DOCD: CPT | Performed by: FAMILY MEDICINE

## 2018-01-19 PROCEDURE — G8599 NO ASA/ANTIPLAT THER USE RNG: HCPCS | Performed by: FAMILY MEDICINE

## 2018-01-19 PROCEDURE — 4040F PNEUMOC VAC/ADMIN/RCVD: CPT | Performed by: FAMILY MEDICINE

## 2018-01-19 PROCEDURE — 4004F PT TOBACCO SCREEN RCVD TLK: CPT | Performed by: FAMILY MEDICINE

## 2018-01-19 PROCEDURE — 99214 OFFICE O/P EST MOD 30 MIN: CPT | Performed by: FAMILY MEDICINE

## 2018-01-19 NOTE — PROGRESS NOTES
Chief Complaint   Patient presents with    Diabetes     3 mo f/u    Discuss Medications       myalgia and arthralgia are continuing but are relatively stable  feet/ankles  Review bw    some cardiac  hypertrophy has been identified  sees cardio  had echo  No new issues reported    htn relatively stable  No cp/sob  No angina    Cad condition has been stable  No angina    tob abuse continues  Must quit  She declines  Discussed risks and options for discontinuation    Anxiety relatively stable  Concerned about health of spouse  No suicidal/homocidal ideation. No psychotic or manic symptoms. Patient presents for exam.      Patient Active Problem List   Diagnosis    Hypercholesterolemia    Anxiety    Tobacco abuse    Vitamin D deficiency    Essential hypertension    Coronary artery disease involving native coronary artery of native heart without angina pectoris    Hyperglycemia    Glaucoma of both eyes       has a current medication list which includes the following prescription(s): alprazolam, paroxetine, dorzolamide-timolol, simvastatin, pantoprazole, losartan, d3-50, lumigan, and amlodipine. Past Medical History:   Diagnosis Date    CAD (coronary artery disease)     Glaucoma     History of mammogram 2011    CATEGORY 2    Hypercholesterolemia     Hyperlipidemia     Hypertension        Past Surgical History:   Procedure Laterality Date    ANGIOPLASTY      June 2017    HYSTERECTOMY  1977    CO COLON CA SCRN NOT  [address]  IND N/A 6/8/2017    COLONOSCOPY performed by Kylie Thakkar MD at [address] [facility] ESOPHAGOGASTRODUODENOSCOPY TRANSORAL DIAGNOSTIC N/A 6/8/2017    EGD ESOPHAGOGASTRODUODENOSCOPY performed by Kylie Thakkar MD at [address] [facility],[address] SALPINGO-OOPHORECTOMY      LEFT       @[facility]@    family history includes Cancer in her father and another family member; Rick Jazmine in her brother and sister; High Blood Pressure in an other family member.     Social History Social History    Marital status: Single     Spouse name: N/A    Number of children: N/A    Years of education: N/A     Occupational History    Not on file. Social History Main Topics    Smoking status: Current Every Day Smoker     Packs/day: 0.50     Years: 50.00     Types: Cigarettes    Smokeless tobacco: Never Used    Alcohol use No    Drug use: No    Sexual activity: Not on file     Other Topics Concern    Not on file     Social History Narrative    No narrative on file       Allergies   Allergen Reactions    Antihistamine [Diphenhydramine Hcl]     Decongestant [Albertsons Di Bromm]        Review of Systems - General ROS: negative  Psychological ROS: negative  ENT ROS: negative  Hematological and Lymphatic ROS: negative  Endocrine ROS: negative  Respiratory ROS: no cough, shortness of breath, or wheezing  Cardiovascular ROS: no chest pain or dyspnea on exertion  Gastrointestinal ROS: no abdominal pain, change in bowel habits, or black or bloody stools  Genito-Urinary ROS: no dysuria, trouble voiding, or hematuria  Musculoskeletal ROS: negative  Neurological ROS: no TIA or stroke symptoms  Dermatological ROS: negative    Blood pressure 132/72, pulse 61, temperature 97.8 °F (36.6 °C), temperature source Temporal, resp. rate 16, height 5' 1\" (1.549 m), weight 124 lb (56.2 kg), SpO2 93 %, not currently breastfeeding.     Physical Examination: General appearance - alert, well appearing, and in no distress  Mental status - alert, oriented to person, place, and time  Eyes - pupils equal and reactive, extraocular eye movements intact  Ears - bilateral TM's and external ear canals normal  Mouth - mucous membranes moist, pharynx normal without lesions  Neck - supple, no significant adenopathy  Lymphatics - no palpable lymphadenopathy, no hepatosplenomegaly  Chest - clear to auscultation, no wheezes, rales or rhonchi, symmetric air entry  Heart - normal rate, regular rhythm, normal S1, S2, no murmurs,

## 2018-01-19 NOTE — PATIENT INSTRUCTIONS
you take decongestants or anti-inflammatory medicine, such as ibuprofen. Some of these medicines can raise blood pressure. · Learn how to check your blood pressure at home. Lifestyle changes  · Stay at a healthy weight. This is especially important if you put on weight around the waist. Losing even 10 pounds can help you lower your blood pressure. · If your doctor recommends it, get more exercise. Walking is a good choice. Bit by bit, increase the amount you walk every day. Try for at least 30 minutes on most days of the week. You also may want to swim, bike, or do other activities. · Avoid or limit alcohol. Talk to your doctor about whether you can drink any alcohol. · Try to limit how much sodium you eat to less than 2,300 milligrams (mg) a day. Your doctor may ask you to try to eat less than 1,500 mg a day. · Eat plenty of fruits (such as bananas and oranges), vegetables, legumes, whole grains, and low-fat dairy products. · Lower the amount of saturated fat in your diet. Saturated fat is found in animal products such as milk, cheese, and meat. Limiting these foods may help you lose weight and also lower your risk for heart disease. · Do not smoke. Smoking increases your risk for heart attack and stroke. If you need help quitting, talk to your doctor about stop-smoking programs and medicines. These can increase your chances of quitting for good. When should you call for help? Call 911 anytime you think you may need emergency care. This may mean having symptoms that suggest that your blood pressure is causing a serious heart or blood vessel problem. Your blood pressure may be over 180/110. ? For example, call 911 if:  ? · You have symptoms of a heart attack. These may include:  ¨ Chest pain or pressure, or a strange feeling in the chest.  ¨ Sweating. ¨ Shortness of breath. ¨ Nausea or vomiting.   ¨ Pain, pressure, or a strange feeling in the back, neck, jaw, or upper belly or in one or both shoulders or arms.  ¨ Lightheadedness or sudden weakness. ¨ A fast or irregular heartbeat. ? · You have symptoms of a stroke. These may include:  ¨ Sudden numbness, tingling, weakness, or loss of movement in your face, arm, or leg, especially on only one side of your body. ¨ Sudden vision changes. ¨ Sudden trouble speaking. ¨ Sudden confusion or trouble understanding simple statements. ¨ Sudden problems with walking or balance. ¨ A sudden, severe headache that is different from past headaches. ? · You have severe back or belly pain. ?Do not wait until your blood pressure comes down on its own. Get help right away. ?Call your doctor now or seek immediate care if:  ? · Your blood pressure is much higher than normal (such as 180/110 or higher), but you don't have symptoms. ? · You think high blood pressure is causing symptoms, such as:  ¨ Severe headache. ¨ Blurry vision. ? Watch closely for changes in your health, and be sure to contact your doctor if:  ? · Your blood pressure measures 140/90 or higher at least 2 times. That means the top number is 140 or higher or the bottom number is 90 or higher, or both. ? · You think you may be having side effects from your blood pressure medicine. ? · Your blood pressure is usually normal, but it goes above normal at least 2 times. Where can you learn more? Go to https://[employer]elliworks.[employer]usion[employer]. org and sign in to your [employer] account. Enter L063 in the [facility] box to learn more about \"High Blood Pressure: Care Instructions. \"     If you do not have an account, please click on the \"Sign Up Now\" link. Current as of: Skylar 10, 2017  Content Version: 11.5  © 6182-1200 [employer]. Care instructions adapted under license by [facility] ([facility]).  If you have questions about a medical condition or this instruction, always ask your healthcare professional. Jeremiah Beckman any warranty or liability for your use of this information.

## 2018-04-05 RX ORDER — SIMVASTATIN 20 MG
TABLET ORAL
Qty: 90 TABLET | Refills: 0 | Status: SHIPPED | OUTPATIENT
Start: 2018-04-05 | End: 2018-04-20 | Stop reason: SDUPTHER

## 2018-04-20 ENCOUNTER — OFFICE VISIT (OUTPATIENT)
Dept: FAMILY MEDICINE CLINIC | Age: 78
End: 2018-04-20
Payer: MEDICARE

## 2018-04-20 VITALS
WEIGHT: 127 LBS | DIASTOLIC BLOOD PRESSURE: 74 MMHG | BODY MASS INDEX: 23.98 KG/M2 | SYSTOLIC BLOOD PRESSURE: 112 MMHG | TEMPERATURE: 97.6 F | HEIGHT: 61 IN | RESPIRATION RATE: 16 BRPM | HEART RATE: 68 BPM

## 2018-04-20 DIAGNOSIS — E55.9 VITAMIN D DEFICIENCY: ICD-10-CM

## 2018-04-20 DIAGNOSIS — R73.9 HYPERGLYCEMIA: ICD-10-CM

## 2018-04-20 DIAGNOSIS — F41.9 ANXIETY: ICD-10-CM

## 2018-04-20 DIAGNOSIS — I10 ESSENTIAL HYPERTENSION: ICD-10-CM

## 2018-04-20 DIAGNOSIS — E78.00 HYPERCHOLESTEROLEMIA: ICD-10-CM

## 2018-04-20 DIAGNOSIS — Z72.0 TOBACCO ABUSE: ICD-10-CM

## 2018-04-20 DIAGNOSIS — I25.10 CORONARY ARTERY DISEASE INVOLVING NATIVE CORONARY ARTERY OF NATIVE HEART WITHOUT ANGINA PECTORIS: Primary | ICD-10-CM

## 2018-04-20 PROCEDURE — 4004F PT TOBACCO SCREEN RCVD TLK: CPT | Performed by: FAMILY MEDICINE

## 2018-04-20 PROCEDURE — 99214 OFFICE O/P EST MOD 30 MIN: CPT | Performed by: FAMILY MEDICINE

## 2018-04-20 PROCEDURE — 1123F ACP DISCUSS/DSCN MKR DOCD: CPT | Performed by: FAMILY MEDICINE

## 2018-04-20 PROCEDURE — G8599 NO ASA/ANTIPLAT THER USE RNG: HCPCS | Performed by: FAMILY MEDICINE

## 2018-04-20 PROCEDURE — 4040F PNEUMOC VAC/ADMIN/RCVD: CPT | Performed by: FAMILY MEDICINE

## 2018-04-20 PROCEDURE — G8399 PT W/DXA RESULTS DOCUMENT: HCPCS | Performed by: FAMILY MEDICINE

## 2018-04-20 PROCEDURE — G8420 CALC BMI NORM PARAMETERS: HCPCS | Performed by: FAMILY MEDICINE

## 2018-04-20 PROCEDURE — 1090F PRES/ABSN URINE INCON ASSESS: CPT | Performed by: FAMILY MEDICINE

## 2018-04-20 PROCEDURE — G8427 DOCREV CUR MEDS BY ELIG CLIN: HCPCS | Performed by: FAMILY MEDICINE

## 2018-04-20 RX ORDER — PAROXETINE HYDROCHLORIDE 40 MG/1
40 TABLET, FILM COATED ORAL EVERY MORNING
Qty: 90 TABLET | Refills: 1 | Status: SHIPPED | OUTPATIENT
Start: 2018-04-20 | End: 2018-12-26 | Stop reason: SDUPTHER

## 2018-04-20 RX ORDER — ALPRAZOLAM 0.5 MG/1
0.5 TABLET ORAL 3 TIMES DAILY PRN
Qty: 270 TABLET | Refills: 0 | Status: SHIPPED | OUTPATIENT
Start: 2018-04-20 | End: 2018-07-09 | Stop reason: SDUPTHER

## 2018-04-20 RX ORDER — SIMVASTATIN 20 MG
TABLET ORAL
Qty: 90 TABLET | Refills: 3 | Status: SHIPPED | OUTPATIENT
Start: 2018-04-20 | End: 2018-12-06

## 2018-04-20 RX ORDER — AMLODIPINE BESYLATE 5 MG/1
TABLET ORAL
Qty: 90 TABLET | Refills: 3 | Status: SHIPPED | OUTPATIENT
Start: 2018-04-20 | End: 2018-12-27 | Stop reason: SDUPTHER

## 2018-04-20 RX ORDER — PANTOPRAZOLE SODIUM 40 MG/1
40 TABLET, DELAYED RELEASE ORAL DAILY
Qty: 30 TABLET | Status: CANCELLED | OUTPATIENT
Start: 2018-04-20

## 2018-04-20 RX ORDER — LOSARTAN POTASSIUM 25 MG/1
25 TABLET ORAL DAILY
Qty: 30 TABLET | Status: CANCELLED | OUTPATIENT
Start: 2018-04-20

## 2018-04-20 ASSESSMENT — PATIENT HEALTH QUESTIONNAIRE - PHQ9
SUM OF ALL RESPONSES TO PHQ QUESTIONS 1-9: 0
1. LITTLE INTEREST OR PLEASURE IN DOING THINGS: 0
2. FEELING DOWN, DEPRESSED OR HOPELESS: 0
SUM OF ALL RESPONSES TO PHQ9 QUESTIONS 1 & 2: 0

## 2018-05-08 RX ORDER — LOSARTAN POTASSIUM 25 MG/1
TABLET ORAL
Qty: 180 TABLET | Refills: 2 | Status: SHIPPED | OUTPATIENT
Start: 2018-05-08 | End: 2019-01-21 | Stop reason: SDUPTHER

## 2018-07-09 ENCOUNTER — TELEPHONE (OUTPATIENT)
Dept: FAMILY MEDICINE CLINIC | Age: 78
End: 2018-07-09

## 2018-07-09 DIAGNOSIS — F41.9 ANXIETY: ICD-10-CM

## 2018-07-09 RX ORDER — ALPRAZOLAM 0.5 MG/1
0.5 TABLET ORAL 3 TIMES DAILY PRN
Qty: 270 TABLET | Refills: 0 | Status: SHIPPED | OUTPATIENT
Start: 2018-07-09 | End: 2018-10-12 | Stop reason: SDUPTHER

## 2018-07-09 NOTE — TELEPHONE ENCOUNTER
Controlled Substances Monitoring:     RX Monitoring 7/9/2018   Attestation The Prescription Monitoring Report for this patient was reviewed today. Documentation No signs of potential drug abuse or diversion identified.

## 2018-07-09 NOTE — TELEPHONE ENCOUNTER
Pt called in for refill on med   ALPRAZolam (XANAX) 0.5 MG tablet   said she has 5 days worth left is being proactive so she does not forget      please advise      pt ph: [phone]

## 2018-07-24 ENCOUNTER — OFFICE VISIT (OUTPATIENT)
Dept: FAMILY MEDICINE CLINIC | Age: 78
End: 2018-07-24
Payer: MEDICARE

## 2018-07-24 VITALS
HEART RATE: 68 BPM | DIASTOLIC BLOOD PRESSURE: 78 MMHG | TEMPERATURE: 98.5 F | WEIGHT: 130 LBS | BODY MASS INDEX: 24.55 KG/M2 | HEIGHT: 61 IN | SYSTOLIC BLOOD PRESSURE: 118 MMHG | RESPIRATION RATE: 16 BRPM

## 2018-07-24 DIAGNOSIS — F41.9 ANXIETY: ICD-10-CM

## 2018-07-24 DIAGNOSIS — I10 ESSENTIAL HYPERTENSION: ICD-10-CM

## 2018-07-24 DIAGNOSIS — Z72.0 TOBACCO ABUSE: ICD-10-CM

## 2018-07-24 DIAGNOSIS — R73.9 HYPERGLYCEMIA: ICD-10-CM

## 2018-07-24 DIAGNOSIS — I25.10 CORONARY ARTERY DISEASE INVOLVING NATIVE CORONARY ARTERY OF NATIVE HEART WITHOUT ANGINA PECTORIS: Primary | ICD-10-CM

## 2018-07-24 DIAGNOSIS — I25.10 CORONARY ARTERY DISEASE INVOLVING NATIVE CORONARY ARTERY OF NATIVE HEART WITHOUT ANGINA PECTORIS: ICD-10-CM

## 2018-07-24 DIAGNOSIS — E78.00 HYPERCHOLESTEROLEMIA: ICD-10-CM

## 2018-07-24 LAB
ALBUMIN SERPL-MCNC: 4.6 G/DL (ref 3.9–4.9)
ALP BLD-CCNC: 71 U/L (ref 40–130)
ALT SERPL-CCNC: 16 U/L (ref 0–33)
ANION GAP SERPL CALCULATED.3IONS-SCNC: 15 MEQ/L (ref 7–13)
AST SERPL-CCNC: 15 U/L (ref 0–35)
BILIRUB SERPL-MCNC: 0.6 MG/DL (ref 0–1.2)
BUN BLDV-MCNC: 21 MG/DL (ref 8–23)
CALCIUM SERPL-MCNC: 9.7 MG/DL (ref 8.6–10.2)
CHLORIDE BLD-SCNC: 103 MEQ/L (ref 98–107)
CHOLESTEROL, TOTAL: 162 MG/DL (ref 0–199)
CO2: 23 MEQ/L (ref 22–29)
CREAT SERPL-MCNC: 0.81 MG/DL (ref 0.5–0.9)
GFR AFRICAN AMERICAN: >60
GFR NON-AFRICAN AMERICAN: >60
GLOBULIN: 2.8 G/DL (ref 2.3–3.5)
GLUCOSE BLD-MCNC: 103 MG/DL (ref 74–109)
HDLC SERPL-MCNC: 62 MG/DL (ref 40–59)
LDL CHOLESTEROL CALCULATED: 88 MG/DL (ref 0–129)
POTASSIUM SERPL-SCNC: 4.9 MEQ/L (ref 3.5–5.1)
SODIUM BLD-SCNC: 141 MEQ/L (ref 132–144)
TOTAL PROTEIN: 7.4 G/DL (ref 6.4–8.1)
TRIGL SERPL-MCNC: 62 MG/DL (ref 0–200)

## 2018-07-24 PROCEDURE — G8599 NO ASA/ANTIPLAT THER USE RNG: HCPCS | Performed by: FAMILY MEDICINE

## 2018-07-24 PROCEDURE — 1123F ACP DISCUSS/DSCN MKR DOCD: CPT | Performed by: FAMILY MEDICINE

## 2018-07-24 PROCEDURE — 4004F PT TOBACCO SCREEN RCVD TLK: CPT | Performed by: FAMILY MEDICINE

## 2018-07-24 PROCEDURE — G8399 PT W/DXA RESULTS DOCUMENT: HCPCS | Performed by: FAMILY MEDICINE

## 2018-07-24 PROCEDURE — 4040F PNEUMOC VAC/ADMIN/RCVD: CPT | Performed by: FAMILY MEDICINE

## 2018-07-24 PROCEDURE — G8420 CALC BMI NORM PARAMETERS: HCPCS | Performed by: FAMILY MEDICINE

## 2018-07-24 PROCEDURE — 99214 OFFICE O/P EST MOD 30 MIN: CPT | Performed by: FAMILY MEDICINE

## 2018-07-24 PROCEDURE — G8427 DOCREV CUR MEDS BY ELIG CLIN: HCPCS | Performed by: FAMILY MEDICINE

## 2018-07-24 PROCEDURE — 1101F PT FALLS ASSESS-DOCD LE1/YR: CPT | Performed by: FAMILY MEDICINE

## 2018-07-24 PROCEDURE — 1090F PRES/ABSN URINE INCON ASSESS: CPT | Performed by: FAMILY MEDICINE

## 2018-07-24 NOTE — PROGRESS NOTES
Chief Complaint   Patient presents with    Diabetes     3 mo f/up        myalgia and arthralgia are continuing but are relatively stable at this time  feet/ankles  Review bw  Anti-inflammatories are helpful    some cardiac  hypertrophy has been identified  sees cardio for regular follow-up  had echo  No new issues reported  Has seen cardiology as well    htn relatively stable at this time  No cp/sob  No angina    Cad condition has been stable  No angina has been reported at this time    tob abuse continues  Must quit  She declines  Discussed trying to at least decrease smoking  Discussed risks and options for discontinuation    Anxiety relatively stable  Concerned about health of spouse and her daughter, who has cancer  No suicidal/homocidal ideation. No psychotic or manic symptoms. High blood sugar  Watch diet  Check blood work as well    Patient presents for exam.      Patient Active Problem List   Diagnosis    Hypercholesterolemia    Anxiety    Tobacco abuse    Vitamin D deficiency    Essential hypertension    Coronary artery disease involving native coronary artery of native heart without angina pectoris    Hyperglycemia    Glaucoma of both eyes       has a current medication list which includes the following prescription(s): alprazolam, losartan, simvastatin, paroxetine, amlodipine, vitamin d, dorzolamide-timolol, pantoprazole, and lumigan.     Past Medical History:   Diagnosis Date    CAD (coronary artery disease)     Glaucoma     History of mammogram 2011    CATEGORY 2    Hypercholesterolemia     Hyperlipidemia     Hypertension        Past Surgical History:   Procedure Laterality Date    ANGIOPLASTY      June 2017    HYSTERECTOMY  1977    CO COLON CA SCRN NOT  [address] IND N/A 6/8/2017    COLONOSCOPY performed by Nivia Qiu MD at [address] [facility] ESOPHAGOGASTRODUODENOSCOPY TRANSORAL DIAGNOSTIC N/A 6/8/2017    EGD ESOPHAGOGASTRODUODENOSCOPY performed by Nivia Qiu MD at BY MOUTH EVERY WEEK. 12 capsule 3    dorzolamide-timolol (COSOPT) 22.3-6.8 MG/ML ophthalmic solution       pantoprazole (PROTONIX) 40 MG tablet Take 40 mg by mouth daily Indications: 1 tablet BID for 2 weeks, then 1 tablet daily for 12 weeks.  LUMIGAN 0.01 % SOLN ophthalmic drops Place 1 drop into both eyes nightly 7.5 mL 3     No facility-administered encounter medications on file as of 7/24/2018. Stop tob  D/w pt  Watch diet and carbs  Return in about 3 months (around 10/24/2018). Controlled Substances Monitoring:      Patient education provided. They understand and agree with this course of treatment. They will return with new or worsening symptoms. Patient instructed to remain current with appropriate annual health maintenance.

## 2018-10-08 ENCOUNTER — OFFICE VISIT (OUTPATIENT)
Dept: FAMILY MEDICINE CLINIC | Age: 78
End: 2018-10-08
Payer: MEDICARE

## 2018-10-08 VITALS
DIASTOLIC BLOOD PRESSURE: 70 MMHG | BODY MASS INDEX: 23.79 KG/M2 | HEART RATE: 66 BPM | SYSTOLIC BLOOD PRESSURE: 118 MMHG | WEIGHT: 126 LBS | RESPIRATION RATE: 16 BRPM | HEIGHT: 61 IN | TEMPERATURE: 98.4 F

## 2018-10-08 DIAGNOSIS — F41.9 ANXIETY: Primary | ICD-10-CM

## 2018-10-08 PROCEDURE — 1090F PRES/ABSN URINE INCON ASSESS: CPT | Performed by: INTERNAL MEDICINE

## 2018-10-08 PROCEDURE — 4040F PNEUMOC VAC/ADMIN/RCVD: CPT | Performed by: INTERNAL MEDICINE

## 2018-10-08 PROCEDURE — G8399 PT W/DXA RESULTS DOCUMENT: HCPCS | Performed by: INTERNAL MEDICINE

## 2018-10-08 PROCEDURE — 99213 OFFICE O/P EST LOW 20 MIN: CPT | Performed by: INTERNAL MEDICINE

## 2018-10-08 PROCEDURE — 1101F PT FALLS ASSESS-DOCD LE1/YR: CPT | Performed by: INTERNAL MEDICINE

## 2018-10-08 PROCEDURE — 4004F PT TOBACCO SCREEN RCVD TLK: CPT | Performed by: INTERNAL MEDICINE

## 2018-10-08 PROCEDURE — G8599 NO ASA/ANTIPLAT THER USE RNG: HCPCS | Performed by: INTERNAL MEDICINE

## 2018-10-08 PROCEDURE — 1123F ACP DISCUSS/DSCN MKR DOCD: CPT | Performed by: INTERNAL MEDICINE

## 2018-10-08 PROCEDURE — G8484 FLU IMMUNIZE NO ADMIN: HCPCS | Performed by: INTERNAL MEDICINE

## 2018-10-08 PROCEDURE — G8420 CALC BMI NORM PARAMETERS: HCPCS | Performed by: INTERNAL MEDICINE

## 2018-10-08 PROCEDURE — G8427 DOCREV CUR MEDS BY ELIG CLIN: HCPCS | Performed by: INTERNAL MEDICINE

## 2018-10-08 RX ORDER — BUSPIRONE HYDROCHLORIDE 5 MG/1
5 TABLET ORAL 3 TIMES DAILY PRN
Qty: 90 TABLET | Refills: 2 | Status: SHIPPED | OUTPATIENT
Start: 2018-10-08 | End: 2018-12-06 | Stop reason: ALTCHOICE

## 2018-10-08 ASSESSMENT — PATIENT HEALTH QUESTIONNAIRE - PHQ9
2. FEELING DOWN, DEPRESSED OR HOPELESS: 0
1. LITTLE INTEREST OR PLEASURE IN DOING THINGS: 1
SUM OF ALL RESPONSES TO PHQ9 QUESTIONS 1 & 2: 1
SUM OF ALL RESPONSES TO PHQ QUESTIONS 1-9: 1
SUM OF ALL RESPONSES TO PHQ QUESTIONS 1-9: 1

## 2018-10-10 ASSESSMENT — ENCOUNTER SYMPTOMS
ABDOMINAL PAIN: 0
NAUSEA: 0
COUGH: 0
RHINORRHEA: 0
SORE THROAT: 0
SINUS PRESSURE: 0
WHEEZING: 0
SHORTNESS OF BREATH: 0
VOMITING: 0
DIARRHEA: 0
SINUS PAIN: 0

## 2018-10-12 ENCOUNTER — TELEPHONE (OUTPATIENT)
Dept: FAMILY MEDICINE CLINIC | Age: 78
End: 2018-10-12

## 2018-10-12 DIAGNOSIS — F41.9 ANXIETY: ICD-10-CM

## 2018-10-12 RX ORDER — ALPRAZOLAM 0.5 MG/1
0.5 TABLET ORAL 3 TIMES DAILY PRN
Qty: 30 TABLET | Refills: 0 | Status: SHIPPED | OUTPATIENT
Start: 2018-10-12 | End: 2018-10-22

## 2018-10-16 ENCOUNTER — TELEPHONE (OUTPATIENT)
Dept: FAMILY MEDICINE CLINIC | Age: 78
End: 2018-10-16

## 2018-10-26 ENCOUNTER — OFFICE VISIT (OUTPATIENT)
Dept: FAMILY MEDICINE CLINIC | Age: 78
End: 2018-10-26
Payer: MEDICARE

## 2018-10-26 VITALS
HEIGHT: 61 IN | BODY MASS INDEX: 23.07 KG/M2 | TEMPERATURE: 97.3 F | HEART RATE: 84 BPM | RESPIRATION RATE: 14 BRPM | WEIGHT: 122.2 LBS | SYSTOLIC BLOOD PRESSURE: 124 MMHG | OXYGEN SATURATION: 98 % | DIASTOLIC BLOOD PRESSURE: 82 MMHG

## 2018-10-26 DIAGNOSIS — F41.9 ANXIETY: ICD-10-CM

## 2018-10-26 DIAGNOSIS — Z72.0 TOBACCO ABUSE: ICD-10-CM

## 2018-10-26 DIAGNOSIS — R73.9 HYPERGLYCEMIA: ICD-10-CM

## 2018-10-26 DIAGNOSIS — I25.10 CORONARY ARTERY DISEASE INVOLVING NATIVE CORONARY ARTERY OF NATIVE HEART WITHOUT ANGINA PECTORIS: ICD-10-CM

## 2018-10-26 DIAGNOSIS — F03.90 DEMENTIA WITHOUT BEHAVIORAL DISTURBANCE, UNSPECIFIED DEMENTIA TYPE: ICD-10-CM

## 2018-10-26 DIAGNOSIS — E55.9 VITAMIN D DEFICIENCY: ICD-10-CM

## 2018-10-26 DIAGNOSIS — I10 ESSENTIAL HYPERTENSION: Primary | ICD-10-CM

## 2018-10-26 PROCEDURE — G8599 NO ASA/ANTIPLAT THER USE RNG: HCPCS | Performed by: FAMILY MEDICINE

## 2018-10-26 PROCEDURE — G8484 FLU IMMUNIZE NO ADMIN: HCPCS | Performed by: FAMILY MEDICINE

## 2018-10-26 PROCEDURE — G8427 DOCREV CUR MEDS BY ELIG CLIN: HCPCS | Performed by: FAMILY MEDICINE

## 2018-10-26 PROCEDURE — 1090F PRES/ABSN URINE INCON ASSESS: CPT | Performed by: FAMILY MEDICINE

## 2018-10-26 PROCEDURE — 4040F PNEUMOC VAC/ADMIN/RCVD: CPT | Performed by: FAMILY MEDICINE

## 2018-10-26 PROCEDURE — G8420 CALC BMI NORM PARAMETERS: HCPCS | Performed by: FAMILY MEDICINE

## 2018-10-26 PROCEDURE — G8399 PT W/DXA RESULTS DOCUMENT: HCPCS | Performed by: FAMILY MEDICINE

## 2018-10-26 PROCEDURE — 1123F ACP DISCUSS/DSCN MKR DOCD: CPT | Performed by: FAMILY MEDICINE

## 2018-10-26 PROCEDURE — 4004F PT TOBACCO SCREEN RCVD TLK: CPT | Performed by: FAMILY MEDICINE

## 2018-10-26 PROCEDURE — 99214 OFFICE O/P EST MOD 30 MIN: CPT | Performed by: FAMILY MEDICINE

## 2018-10-26 PROCEDURE — 1101F PT FALLS ASSESS-DOCD LE1/YR: CPT | Performed by: FAMILY MEDICINE

## 2018-10-26 RX ORDER — ALPRAZOLAM 0.5 MG/1
0.5 TABLET ORAL NIGHTLY PRN
COMMUNITY
End: 2018-10-26 | Stop reason: SDUPTHER

## 2018-10-26 RX ORDER — ALPRAZOLAM 0.5 MG/1
0.5 TABLET ORAL NIGHTLY PRN
Qty: 90 TABLET | Refills: 0 | Status: SHIPPED | OUTPATIENT
Start: 2018-10-26 | End: 2018-11-23 | Stop reason: SDUPTHER

## 2018-10-26 NOTE — PROGRESS NOTES
well appearing, and in no distress  Mental status - alert, oriented to person, place, and time  Eyes - pupils equal and reactive, extraocular eye movements intact  Ears - bilateral TM's and external ear canals normal  Mouth - mucous membranes moist, pharynx normal without lesions  Neck - supple, no significant adenopathy  Lymphatics - no palpable lymphadenopathy, no hepatosplenomegaly  Chest - clear to auscultation, no wheezes, rales or rhonchi, symmetric air entry  Heart - normal rate, regular rhythm, normal S1, S2, no murmurs, rubs, clicks or gallops  Abdomen - soft, nontender, nondistended, no masses or organomegaly  Neurological - alert, oriented, normal speech, no focal findings or movement disorder noted  Musculoskeletal - no joint tenderness, deformity or swelling  Skin - no rash    Assessment:     Diagnosis Orders   1. Essential hypertension     2. Anxiety  ALPRAZolam (XANAX) 0.5 MG tablet   3. Tobacco abuse     4. Vitamin D deficiency     5. Coronary artery disease involving native coronary artery of native heart without angina pectoris     6. Hyperglycemia     7.  Dementia without behavioral disturbance, unspecified dementia type  Referral To Neurology - ([facility]) MD Verena Barajas    MRI Brain W WO Contrast       Plan:     specialty evaluation  Testing as noted  Discuss at length with daughter as  Recommend 1 month follow-up visit      Orders Placed This Encounter   Procedures    MRI Brain W WO Contrast     Standing Status:   Future     Standing Expiration Date:   10/26/2019    Referral To Neurology - ([facility]) MD Verena Barajas     Referral Priority:   Routine     Referral Type:   Eval and Treat     Referral Reason:   Specialty Services Required     Referred to Provider:   Lindsay Moreira MD     Requested Specialty:   Neurology     Number of Visits Requested:   1       Outpatient Encounter Prescriptions as of 10/26/2018   Medication Sig Dispense Refill    ALPRAZolam (XANAX) 0.5 MG tablet

## 2018-11-01 DIAGNOSIS — F03.90 DEMENTIA WITHOUT BEHAVIORAL DISTURBANCE, UNSPECIFIED DEMENTIA TYPE: Primary | ICD-10-CM

## 2018-11-06 ENCOUNTER — HOSPITAL ENCOUNTER (OUTPATIENT)
Dept: MRI IMAGING | Age: 78
Discharge: HOME | End: 2018-11-08
Payer: MEDICARE

## 2018-11-06 DIAGNOSIS — F03.90 DEMENTIA WITHOUT BEHAVIORAL DISTURBANCE, UNSPECIFIED DEMENTIA TYPE: ICD-10-CM

## 2018-11-06 PROCEDURE — 70551 MRI BRAIN STEM W/O DYE: CPT

## 2018-12-06 ENCOUNTER — OFFICE VISIT (OUTPATIENT)
Dept: FAMILY MEDICINE CLINIC | Age: 78
End: 2018-12-06
Payer: MEDICARE

## 2018-12-06 VITALS
BODY MASS INDEX: 23.9 KG/M2 | HEIGHT: 61 IN | WEIGHT: 126.6 LBS | SYSTOLIC BLOOD PRESSURE: 120 MMHG | DIASTOLIC BLOOD PRESSURE: 58 MMHG | TEMPERATURE: 98.6 F | OXYGEN SATURATION: 94 % | RESPIRATION RATE: 14 BRPM | HEART RATE: 70 BPM

## 2018-12-06 DIAGNOSIS — F41.9 ANXIETY: Primary | ICD-10-CM

## 2018-12-06 DIAGNOSIS — I25.10 CORONARY ARTERY DISEASE INVOLVING NATIVE CORONARY ARTERY OF NATIVE HEART WITHOUT ANGINA PECTORIS: ICD-10-CM

## 2018-12-06 DIAGNOSIS — R73.9 HYPERGLYCEMIA: ICD-10-CM

## 2018-12-06 DIAGNOSIS — E78.00 HYPERCHOLESTEROLEMIA: ICD-10-CM

## 2018-12-06 DIAGNOSIS — Z72.0 TOBACCO ABUSE: ICD-10-CM

## 2018-12-06 DIAGNOSIS — I10 ESSENTIAL HYPERTENSION: ICD-10-CM

## 2018-12-06 DIAGNOSIS — E55.9 VITAMIN D DEFICIENCY: ICD-10-CM

## 2018-12-06 PROCEDURE — 4040F PNEUMOC VAC/ADMIN/RCVD: CPT | Performed by: FAMILY MEDICINE

## 2018-12-06 PROCEDURE — 1101F PT FALLS ASSESS-DOCD LE1/YR: CPT | Performed by: FAMILY MEDICINE

## 2018-12-06 PROCEDURE — 1123F ACP DISCUSS/DSCN MKR DOCD: CPT | Performed by: FAMILY MEDICINE

## 2018-12-06 PROCEDURE — G8484 FLU IMMUNIZE NO ADMIN: HCPCS | Performed by: FAMILY MEDICINE

## 2018-12-06 PROCEDURE — G8420 CALC BMI NORM PARAMETERS: HCPCS | Performed by: FAMILY MEDICINE

## 2018-12-06 PROCEDURE — 1090F PRES/ABSN URINE INCON ASSESS: CPT | Performed by: FAMILY MEDICINE

## 2018-12-06 PROCEDURE — G8427 DOCREV CUR MEDS BY ELIG CLIN: HCPCS | Performed by: FAMILY MEDICINE

## 2018-12-06 PROCEDURE — G8599 NO ASA/ANTIPLAT THER USE RNG: HCPCS | Performed by: FAMILY MEDICINE

## 2018-12-06 PROCEDURE — G8399 PT W/DXA RESULTS DOCUMENT: HCPCS | Performed by: FAMILY MEDICINE

## 2018-12-06 PROCEDURE — 4004F PT TOBACCO SCREEN RCVD TLK: CPT | Performed by: FAMILY MEDICINE

## 2018-12-06 PROCEDURE — 99214 OFFICE O/P EST MOD 30 MIN: CPT | Performed by: FAMILY MEDICINE

## 2018-12-06 RX ORDER — SIMVASTATIN 20 MG
20 TABLET ORAL NIGHTLY
COMMUNITY

## 2018-12-06 RX ORDER — PREDNISOLONE ACETATE 10 MG/ML
SUSPENSION/ DROPS OPHTHALMIC
Refills: 0 | COMMUNITY
Start: 2018-11-01

## 2018-12-06 NOTE — PATIENT INSTRUCTIONS
sodium\" may still have too much sodium. Be sure to read the label to see how much sodium you are getting. · Buy fresh vegetables, or frozen vegetables without added sauces. Buy low-sodium versions of canned vegetables, soups, and other canned goods. Prepare low-sodium meals  · Cut back on the amount of salt you use in cooking. This will help you adjust to the taste. Do not add salt after cooking. One teaspoon of salt has about 2,300 mg of sodium. · Take the salt shaker off the table. · Flavor your food with garlic, lemon juice, onion, vinegar, herbs, and spices. Do not use soy sauce, lite soy sauce, steak sauce, onion salt, garlic salt, celery salt, mustard, or ketchup on your food. · Use low-sodium salad dressings, sauces, and ketchup. Or make your own salad dressings and sauces without adding salt. · Use less salt (or none) when recipes call for it. You can often use half the salt a recipe calls for without losing flavor. Other foods such as rice, pasta, and grains do not need added salt. · Rinse canned vegetables, and cook them in fresh water. This removes some--but not all--of the salt. · Avoid water that is naturally high in sodium or that has been treated with water softeners, which add sodium. Call your local water company to find out the sodium content of your water supply. If you buy bottled water, read the label and choose a sodium-free brand. Avoid high-sodium foods  · Avoid eating:  ? Smoked, cured, salted, and canned meat, fish, and poultry. ? Ham, hairston, hot dogs, and luncheon meats. ? Regular, hard, and processed cheese and regular peanut butter. ? Crackers with salted tops, and other salted snack foods such as pretzels, chips, and salted popcorn. ? Frozen prepared meals, unless labeled low-sodium. ? Canned and dried soups, broths, and bouillon, unless labeled sodium-free or low-sodium. ? Canned vegetables, unless labeled sodium-free or low-sodium. ?  [place] fries, pizza, tacos, and

## 2018-12-11 DIAGNOSIS — F41.9 ANXIETY: ICD-10-CM

## 2018-12-11 NOTE — TELEPHONE ENCOUNTER
Patient requesting refill. Medication pended for approval/denial. Thank you.     LOV  12/6/2018    NEXT APPT:  Future Appointments  Date Time Provider Maritza Pineda   3/8/2019 9:00 AM France Wren MD [address]

## 2018-12-13 RX ORDER — ALPRAZOLAM 0.5 MG/1
0.5 TABLET ORAL 3 TIMES DAILY PRN
Qty: 90 TABLET | Refills: 0 | Status: SHIPPED | OUTPATIENT
Start: 2018-12-13 | End: 2019-01-21 | Stop reason: SDUPTHER

## 2018-12-13 NOTE — TELEPHONE ENCOUNTER
Controlled Substances Monitoring:     RX Monitoring 12/13/2018   Attestation The Prescription Monitoring Report for this patient was reviewed today. Documentation No signs of potential drug abuse or diversion identified.    Acute Pain Prescriptions -   Medication Contracts -

## 2018-12-27 RX ORDER — PAROXETINE HYDROCHLORIDE 40 MG/1
TABLET, FILM COATED ORAL
Qty: 90 TABLET | Refills: 1 | Status: SHIPPED | OUTPATIENT
Start: 2018-12-27 | End: 2019-03-25 | Stop reason: SDUPTHER

## 2018-12-27 RX ORDER — AMLODIPINE BESYLATE 5 MG/1
TABLET ORAL
Qty: 90 TABLET | Refills: 2 | Status: SHIPPED | OUTPATIENT
Start: 2018-12-27

## 2019-01-21 ENCOUNTER — TELEPHONE (OUTPATIENT)
Dept: FAMILY MEDICINE CLINIC | Age: 79
End: 2019-01-21

## 2019-01-21 DIAGNOSIS — F41.9 ANXIETY: ICD-10-CM

## 2019-01-21 RX ORDER — LOSARTAN POTASSIUM 25 MG/1
TABLET ORAL
Qty: 180 TABLET | Refills: 2 | Status: SHIPPED | OUTPATIENT
Start: 2019-01-21

## 2019-01-21 RX ORDER — ALPRAZOLAM 0.5 MG/1
0.5 TABLET ORAL 3 TIMES DAILY PRN
Qty: 90 TABLET | Refills: 0 | Status: SHIPPED | OUTPATIENT
Start: 2019-01-21 | End: 2019-02-19 | Stop reason: SDUPTHER

## 2019-02-19 DIAGNOSIS — F41.9 ANXIETY: ICD-10-CM

## 2019-02-21 RX ORDER — ALPRAZOLAM 0.5 MG/1
0.5 TABLET ORAL 3 TIMES DAILY PRN
Qty: 90 TABLET | Refills: 0 | Status: SHIPPED | OUTPATIENT
Start: 2019-02-21 | End: 2019-03-19 | Stop reason: SDUPTHER

## 2019-03-08 ENCOUNTER — OFFICE VISIT (OUTPATIENT)
Dept: FAMILY MEDICINE CLINIC | Age: 79
End: 2019-03-08
Payer: MEDICARE

## 2019-03-08 VITALS
DIASTOLIC BLOOD PRESSURE: 78 MMHG | HEART RATE: 76 BPM | HEIGHT: 61 IN | BODY MASS INDEX: 24.92 KG/M2 | SYSTOLIC BLOOD PRESSURE: 124 MMHG | TEMPERATURE: 98.6 F | WEIGHT: 132 LBS | RESPIRATION RATE: 16 BRPM

## 2019-03-08 DIAGNOSIS — J20.9 ACUTE BRONCHITIS, UNSPECIFIED ORGANISM: ICD-10-CM

## 2019-03-08 DIAGNOSIS — I10 ESSENTIAL HYPERTENSION: ICD-10-CM

## 2019-03-08 DIAGNOSIS — F41.9 ANXIETY: Primary | ICD-10-CM

## 2019-03-08 DIAGNOSIS — Z72.0 TOBACCO ABUSE: ICD-10-CM

## 2019-03-08 DIAGNOSIS — R73.9 HYPERGLYCEMIA: ICD-10-CM

## 2019-03-08 DIAGNOSIS — E78.00 HYPERCHOLESTEROLEMIA: ICD-10-CM

## 2019-03-08 DIAGNOSIS — I25.10 CORONARY ARTERY DISEASE INVOLVING NATIVE CORONARY ARTERY OF NATIVE HEART WITHOUT ANGINA PECTORIS: ICD-10-CM

## 2019-03-08 PROCEDURE — 4040F PNEUMOC VAC/ADMIN/RCVD: CPT | Performed by: FAMILY MEDICINE

## 2019-03-08 PROCEDURE — 4004F PT TOBACCO SCREEN RCVD TLK: CPT | Performed by: FAMILY MEDICINE

## 2019-03-08 PROCEDURE — G8420 CALC BMI NORM PARAMETERS: HCPCS | Performed by: FAMILY MEDICINE

## 2019-03-08 PROCEDURE — 1101F PT FALLS ASSESS-DOCD LE1/YR: CPT | Performed by: FAMILY MEDICINE

## 2019-03-08 PROCEDURE — 1123F ACP DISCUSS/DSCN MKR DOCD: CPT | Performed by: FAMILY MEDICINE

## 2019-03-08 PROCEDURE — G8484 FLU IMMUNIZE NO ADMIN: HCPCS | Performed by: FAMILY MEDICINE

## 2019-03-08 PROCEDURE — G8427 DOCREV CUR MEDS BY ELIG CLIN: HCPCS | Performed by: FAMILY MEDICINE

## 2019-03-08 PROCEDURE — 1090F PRES/ABSN URINE INCON ASSESS: CPT | Performed by: FAMILY MEDICINE

## 2019-03-08 PROCEDURE — 99214 OFFICE O/P EST MOD 30 MIN: CPT | Performed by: FAMILY MEDICINE

## 2019-03-08 PROCEDURE — G8599 NO ASA/ANTIPLAT THER USE RNG: HCPCS | Performed by: FAMILY MEDICINE

## 2019-03-08 PROCEDURE — G8399 PT W/DXA RESULTS DOCUMENT: HCPCS | Performed by: FAMILY MEDICINE

## 2019-03-08 RX ORDER — AMOXICILLIN 500 MG/1
1000 TABLET, FILM COATED ORAL 2 TIMES DAILY
Qty: 40 TABLET | Refills: 0 | Status: SHIPPED | OUTPATIENT
Start: 2019-03-08

## 2019-03-08 ASSESSMENT — PATIENT HEALTH QUESTIONNAIRE - PHQ9
SUM OF ALL RESPONSES TO PHQ9 QUESTIONS 1 & 2: 0
SUM OF ALL RESPONSES TO PHQ QUESTIONS 1-9: 0
SUM OF ALL RESPONSES TO PHQ QUESTIONS 1-9: 0
1. LITTLE INTEREST OR PLEASURE IN DOING THINGS: 0
2. FEELING DOWN, DEPRESSED OR HOPELESS: 0

## 2019-03-11 DIAGNOSIS — F41.9 ANXIETY: ICD-10-CM

## 2019-03-19 RX ORDER — ALPRAZOLAM 0.5 MG/1
0.5 TABLET ORAL 3 TIMES DAILY PRN
Qty: 90 TABLET | Refills: 0 | Status: SHIPPED | OUTPATIENT
Start: 2019-03-19 | End: 2019-04-18

## 2019-03-20 ENCOUNTER — TELEPHONE (OUTPATIENT)
Dept: FAMILY MEDICINE CLINIC | Age: 79
End: 2019-03-20

## 2019-03-20 ENCOUNTER — TELEPHONE (OUTPATIENT)
Dept: ADMINISTRATIVE | Age: 79
End: 2019-03-20

## 2019-03-25 RX ORDER — SIMVASTATIN 20 MG
TABLET ORAL
Qty: 90 TABLET | Refills: 3 | Status: SHIPPED | OUTPATIENT
Start: 2019-03-25

## 2019-03-25 RX ORDER — PAROXETINE HYDROCHLORIDE 40 MG/1
TABLET, FILM COATED ORAL
Qty: 90 TABLET | Refills: 1 | Status: SHIPPED | OUTPATIENT
Start: 2019-03-25

## 2019-04-02 RX ORDER — PANTOPRAZOLE SODIUM 40 MG/1
TABLET, DELAYED RELEASE ORAL
Qty: 60 TABLET | Refills: 3 | Status: SHIPPED | OUTPATIENT
Start: 2019-04-02 | End: 2019-12-09 | Stop reason: SDUPTHER

## 2019-07-01 ENCOUNTER — TELEPHONE (OUTPATIENT)
Dept: ADMINISTRATIVE | Age: 79
End: 2019-07-01

## 2019-12-10 RX ORDER — PANTOPRAZOLE SODIUM 40 MG/1
TABLET, DELAYED RELEASE ORAL
Qty: 60 TABLET | Refills: 0 | Status: SHIPPED | OUTPATIENT
Start: 2019-12-10 | End: 2020-02-03

## 2020-02-03 RX ORDER — PANTOPRAZOLE SODIUM 40 MG/1
TABLET, DELAYED RELEASE ORAL
Qty: 60 TABLET | Refills: 0 | Status: SHIPPED | OUTPATIENT
Start: 2020-02-03

## 2021-10-10 NOTE — TELEPHONE ENCOUNTER
Call, as a 10-d-rx called until addressed by NK next week--done    ; Controlled Substances Monitoring:     RX Monitoring 10/12/2018   Attestation The Prescription Monitoring Report for this patient was reviewed today. Documentation Possible medication side effects, risk of tolerance/dependence & alternative treatments discussed. ;No signs of potential drug abuse or diversion identified. Medication Contracts Existing medication contract.
Patient's daughter calling, patient saw Jstyson Brumfield on 10/8/2018. Patient has lost her Xanax prescription down the drain and was given a prescription for Buspar 5mg by Dr. Elizabeth Huber. Patient has been hallucinating and has fallen a few time. Patient currently has a black eye. Patient does not have any refills of Xanax at the pharmacy and is not due for a prescription for another 3 days. Daughter is wanting to know if new prescription for the Xanax can be sent into the pharmacy for the patient.
  GBS neg (9/13/2021)  COVID neg (10/8)  O neg  Rubella: immune  Varicella: immune  VDRL: NR  HepB: negative  HIV: neg    SONO:  @33w6d: EFW 5lb4oz 64%, BPP 8/8, MVP 3.6  @28w6d: BPP 8/8, MVP 3.06, posterior placenta  @28w4d: EFW 5xo63cc 40$, MVP 2.88, posterior placenta,vertex,  @19w3d: posterior low lying placena, vertex, EFW 11oz, no fetal malformations noted

## 2023-03-23 DIAGNOSIS — F41.1 GENERALIZED ANXIETY DISORDER: ICD-10-CM

## 2023-03-23 RX ORDER — ALPRAZOLAM 0.5 MG/1
0.5 TABLET ORAL 3 TIMES DAILY PRN
Qty: 90 TABLET | Refills: 0 | Status: SHIPPED | OUTPATIENT
Start: 2023-03-23 | End: 2023-04-21 | Stop reason: SDUPTHER

## 2023-03-23 RX ORDER — ALPRAZOLAM 0.5 MG/1
0.5 TABLET ORAL 3 TIMES DAILY PRN
COMMUNITY
End: 2023-03-23 | Stop reason: SDUPTHER

## 2023-03-23 NOTE — TELEPHONE ENCOUNTER
Patient daughter called requesting medication refills for   Xanax .5mg   Pharm[facility] [place]   Nov 5/2/23   Please advise

## 2023-04-21 DIAGNOSIS — F41.1 GENERALIZED ANXIETY DISORDER: ICD-10-CM

## 2023-04-21 RX ORDER — ALPRAZOLAM 0.5 MG/1
0.5 TABLET ORAL 3 TIMES DAILY PRN
Qty: 12 TABLET | Refills: 0 | Status: SHIPPED | OUTPATIENT
Start: 2023-04-21 | End: 2023-04-24 | Stop reason: SDUPTHER

## 2023-04-21 NOTE — TELEPHONE ENCOUNTER
Rx Refill Request Telephone Encounter    Name:  Jodi Nuñez  :  [id number]  Medication Name:  Alprazolam 0.5 mg  Specific Pharmacy location:  [facility]  Date of last appointment:    Date of next appointment:    Best number to reach patient:  [phone]

## 2023-04-24 DIAGNOSIS — F41.1 GENERALIZED ANXIETY DISORDER: ICD-10-CM

## 2023-04-24 RX ORDER — ALPRAZOLAM 0.5 MG/1
0.5 TABLET ORAL 3 TIMES DAILY PRN
Qty: 12 TABLET | Refills: 0 | Status: SHIPPED | OUTPATIENT
Start: 2023-04-24 | End: 2023-05-02 | Stop reason: SDUPTHER

## 2023-04-24 NOTE — TELEPHONE ENCOUNTER
Patient daughter called back requesting the rest of mothers medication   Xanax .5mg   Pharmacy [facility]  Please advise

## 2023-04-26 ENCOUNTER — TELEPHONE (OUTPATIENT)
Dept: PRIMARY CARE | Facility: CLINIC | Age: 83
End: 2023-04-26
Payer: MEDICARE

## 2023-04-26 NOTE — TELEPHONE ENCOUNTER
Jodi's daughter called in to request her mom's appointment on 5/2 to be a virtual visit??    Please advise.

## 2023-05-02 ENCOUNTER — OFFICE VISIT (OUTPATIENT)
Dept: PRIMARY CARE | Facility: CLINIC | Age: 83
End: 2023-05-02
Payer: MEDICARE

## 2023-05-02 VITALS
BODY MASS INDEX: 23.15 KG/M2 | TEMPERATURE: 98.1 F | DIASTOLIC BLOOD PRESSURE: 72 MMHG | HEART RATE: 64 BPM | WEIGHT: 122.6 LBS | SYSTOLIC BLOOD PRESSURE: 132 MMHG | OXYGEN SATURATION: 95 % | HEIGHT: 61 IN

## 2023-05-02 DIAGNOSIS — C65.1 MALIGNANT NEOPLASM OF RIGHT RENAL PELVIS (MULTI): ICD-10-CM

## 2023-05-02 DIAGNOSIS — I25.10 CORONARY ARTERY DISEASE INVOLVING NATIVE CORONARY ARTERY OF NATIVE HEART WITHOUT ANGINA PECTORIS: Primary | ICD-10-CM

## 2023-05-02 DIAGNOSIS — E78.2 MIXED HYPERLIPIDEMIA: ICD-10-CM

## 2023-05-02 DIAGNOSIS — I10 ESSENTIAL HYPERTENSION: ICD-10-CM

## 2023-05-02 DIAGNOSIS — G30.1 MODERATE LATE ONSET ALZHEIMER'S DEMENTIA WITHOUT BEHAVIORAL DISTURBANCE, PSYCHOTIC DISTURBANCE, MOOD DISTURBANCE, OR ANXIETY (MULTI): ICD-10-CM

## 2023-05-02 DIAGNOSIS — G25.81 RESTLESS LEG SYNDROME: ICD-10-CM

## 2023-05-02 DIAGNOSIS — N18.31 CHRONIC KIDNEY DISEASE, STAGE 3A (MULTI): ICD-10-CM

## 2023-05-02 DIAGNOSIS — I48.0 PAROXYSMAL ATRIAL FIBRILLATION (MULTI): ICD-10-CM

## 2023-05-02 DIAGNOSIS — K21.9 GASTROESOPHAGEAL REFLUX DISEASE WITHOUT ESOPHAGITIS: ICD-10-CM

## 2023-05-02 DIAGNOSIS — F41.1 GENERALIZED ANXIETY DISORDER: ICD-10-CM

## 2023-05-02 DIAGNOSIS — F02.B0 MODERATE LATE ONSET ALZHEIMER'S DEMENTIA WITHOUT BEHAVIORAL DISTURBANCE, PSYCHOTIC DISTURBANCE, MOOD DISTURBANCE, OR ANXIETY (MULTI): ICD-10-CM

## 2023-05-02 PROBLEM — R22.9 SKIN NODULE: Status: ACTIVE | Noted: 2023-05-02

## 2023-05-02 PROBLEM — R89.6 ABNORMAL BLADDER CYTOLOGY: Status: RESOLVED | Noted: 2023-05-02 | Resolved: 2023-05-02

## 2023-05-02 PROBLEM — K80.20 GALLSTONES: Status: ACTIVE | Noted: 2023-05-02

## 2023-05-02 PROBLEM — R94.31 ABNORMAL EKG: Status: RESOLVED | Noted: 2023-05-02 | Resolved: 2023-05-02

## 2023-05-02 PROBLEM — C65.9: Status: ACTIVE | Noted: 2023-05-02

## 2023-05-02 PROBLEM — R31.0 GROSS HEMATURIA: Status: ACTIVE | Noted: 2023-05-02

## 2023-05-02 PROBLEM — I67.82 ISCHEMIC CEREBROVASCULAR DISEASE: Status: ACTIVE | Noted: 2023-05-02

## 2023-05-02 PROBLEM — N35.919 URETHRAL STRICTURE: Status: ACTIVE | Noted: 2023-05-02

## 2023-05-02 PROBLEM — I51.7 LVH (LEFT VENTRICULAR HYPERTROPHY): Status: ACTIVE | Noted: 2023-05-02

## 2023-05-02 PROBLEM — R26.0 ATAXIC GAIT: Status: ACTIVE | Noted: 2023-05-02

## 2023-05-02 PROBLEM — N93.9 ABNORMAL VAGINAL BLEEDING: Status: RESOLVED | Noted: 2023-05-02 | Resolved: 2023-05-02

## 2023-05-02 PROBLEM — G25.0 TREMOR, ESSENTIAL: Status: ACTIVE | Noted: 2023-05-02

## 2023-05-02 PROBLEM — M19.90 ARTHRITIS: Status: ACTIVE | Noted: 2023-05-02

## 2023-05-02 PROBLEM — K14.8 TONGUE LESION: Status: RESOLVED | Noted: 2023-05-02 | Resolved: 2023-05-02

## 2023-05-02 PROBLEM — E34.9 ELEVATED CALCITONIN LEVEL: Status: ACTIVE | Noted: 2023-05-02

## 2023-05-02 PROBLEM — N20.0 CALCULUS OF KIDNEY: Status: RESOLVED | Noted: 2023-05-02 | Resolved: 2023-05-02

## 2023-05-02 PROBLEM — R93.1 ELEVATED CORONARY ARTERY CALCIUM SCORE: Status: ACTIVE | Noted: 2023-05-02

## 2023-05-02 PROBLEM — B35.1 ONYCHOMYCOSIS: Status: ACTIVE | Noted: 2023-05-02

## 2023-05-02 PROBLEM — I48.91 ATRIAL FIBRILLATION (MULTI): Status: ACTIVE | Noted: 2023-05-02

## 2023-05-02 PROBLEM — R41.3 MEMORY LOSS: Status: ACTIVE | Noted: 2023-05-02

## 2023-05-02 PROBLEM — J20.9 ACUTE BRONCHITIS: Status: RESOLVED | Noted: 2019-03-08 | Resolved: 2023-05-02

## 2023-05-02 PROBLEM — F17.200 CURRENT EVERY DAY SMOKER: Status: ACTIVE | Noted: 2023-05-02

## 2023-05-02 PROBLEM — E78.00 HYPERCHOLESTEROLEMIA: Status: ACTIVE | Noted: 2023-05-02

## 2023-05-02 PROBLEM — R25.2 MUSCLE CRAMP: Status: ACTIVE | Noted: 2023-05-02

## 2023-05-02 PROBLEM — F03.90 DEMENTIA (MULTI): Status: ACTIVE | Noted: 2023-05-02

## 2023-05-02 PROCEDURE — 3075F SYST BP GE 130 - 139MM HG: CPT | Performed by: FAMILY MEDICINE

## 2023-05-02 PROCEDURE — 99215 OFFICE O/P EST HI 40 MIN: CPT | Performed by: FAMILY MEDICINE

## 2023-05-02 PROCEDURE — 1159F MED LIST DOCD IN RCRD: CPT | Performed by: FAMILY MEDICINE

## 2023-05-02 PROCEDURE — 3078F DIAST BP <80 MM HG: CPT | Performed by: FAMILY MEDICINE

## 2023-05-02 PROCEDURE — 1160F RVW MEDS BY RX/DR IN RCRD: CPT | Performed by: FAMILY MEDICINE

## 2023-05-02 RX ORDER — BUSPIRONE HYDROCHLORIDE 10 MG/1
10 TABLET ORAL 3 TIMES DAILY
Qty: 90 TABLET | Refills: 3 | Status: SHIPPED | OUTPATIENT
Start: 2023-05-02 | End: 2023-08-23 | Stop reason: SDUPTHER

## 2023-05-02 RX ORDER — BUSPIRONE HYDROCHLORIDE 10 MG/1
TABLET ORAL
COMMUNITY
Start: 2019-09-16 | End: 2023-05-02 | Stop reason: SDUPTHER

## 2023-05-02 RX ORDER — AMLODIPINE BESYLATE 5 MG/1
1 TABLET ORAL DAILY
COMMUNITY
Start: 2018-12-27 | End: 2023-05-02 | Stop reason: SDUPTHER

## 2023-05-02 RX ORDER — LOSARTAN POTASSIUM 25 MG/1
1 TABLET ORAL 2 TIMES DAILY
COMMUNITY
Start: 2019-01-21 | End: 2023-08-23 | Stop reason: SDUPTHER

## 2023-05-02 RX ORDER — ATORVASTATIN CALCIUM 40 MG/1
40 TABLET, FILM COATED ORAL DAILY
Qty: 90 TABLET | Refills: 3 | Status: SHIPPED | OUTPATIENT
Start: 2023-05-02 | End: 2024-02-29 | Stop reason: SDUPTHER

## 2023-05-02 RX ORDER — DONEPEZIL HYDROCHLORIDE 10 MG/1
10 TABLET, FILM COATED ORAL NIGHTLY
Qty: 90 TABLET | Refills: 3 | Status: SHIPPED | OUTPATIENT
Start: 2023-05-02 | End: 2023-09-21 | Stop reason: SDUPTHER

## 2023-05-02 RX ORDER — MEMANTINE HYDROCHLORIDE 10 MG/1
10 TABLET ORAL 2 TIMES DAILY
Qty: 180 TABLET | Refills: 3 | Status: SHIPPED | OUTPATIENT
Start: 2023-05-02 | End: 2023-09-21 | Stop reason: SDUPTHER

## 2023-05-02 RX ORDER — ROPINIROLE 0.5 MG/1
0.5 TABLET, FILM COATED ORAL NIGHTLY
Qty: 90 TABLET | Refills: 3 | Status: SHIPPED | OUTPATIENT
Start: 2023-05-02 | End: 2024-02-29 | Stop reason: SDUPTHER

## 2023-05-02 RX ORDER — PANTOPRAZOLE SODIUM 40 MG/1
1 TABLET, DELAYED RELEASE ORAL DAILY
COMMUNITY
Start: 2020-02-03 | End: 2023-05-02 | Stop reason: SDUPTHER

## 2023-05-02 RX ORDER — ASPIRIN 325 MG
1 TABLET, DELAYED RELEASE (ENTERIC COATED) ORAL WEEKLY
COMMUNITY
Start: 2018-04-20 | End: 2023-05-25 | Stop reason: SDUPTHER

## 2023-05-02 RX ORDER — DONEPEZIL HYDROCHLORIDE 10 MG/1
1 TABLET, FILM COATED ORAL NIGHTLY
COMMUNITY
Start: 2021-05-14 | End: 2023-05-02 | Stop reason: SDUPTHER

## 2023-05-02 RX ORDER — ATORVASTATIN CALCIUM 40 MG/1
1 TABLET, FILM COATED ORAL DAILY
COMMUNITY
Start: 2021-04-08 | End: 2023-05-02 | Stop reason: SDUPTHER

## 2023-05-02 RX ORDER — SERTRALINE HYDROCHLORIDE 100 MG/1
200 TABLET, FILM COATED ORAL 2 TIMES DAILY
Qty: 180 TABLET | Refills: 3 | Status: SHIPPED | OUTPATIENT
Start: 2023-05-02 | End: 2023-05-28

## 2023-05-02 RX ORDER — MELATONIN 10 MG
CAPSULE ORAL
COMMUNITY

## 2023-05-02 RX ORDER — BIMATOPROST 0.1 MG/ML
SOLUTION/ DROPS OPHTHALMIC
COMMUNITY

## 2023-05-02 RX ORDER — ALPRAZOLAM 0.5 MG/1
0.5 TABLET ORAL 3 TIMES DAILY PRN
Qty: 90 TABLET | Refills: 0 | Status: SHIPPED | OUTPATIENT
Start: 2023-05-02 | End: 2023-05-23

## 2023-05-02 RX ORDER — AMLODIPINE BESYLATE 5 MG/1
5 TABLET ORAL DAILY
Qty: 90 TABLET | Refills: 3 | Status: SHIPPED | OUTPATIENT
Start: 2023-05-02 | End: 2024-02-29 | Stop reason: SDUPTHER

## 2023-05-02 RX ORDER — SERTRALINE HYDROCHLORIDE 100 MG/1
2 TABLET, FILM COATED ORAL DAILY
COMMUNITY
Start: 2020-05-11 | End: 2023-05-02 | Stop reason: SDUPTHER

## 2023-05-02 RX ORDER — PANTOPRAZOLE SODIUM 40 MG/1
40 TABLET, DELAYED RELEASE ORAL DAILY
Qty: 90 TABLET | Refills: 3 | Status: SHIPPED | OUTPATIENT
Start: 2023-05-02 | End: 2024-02-29 | Stop reason: SDUPTHER

## 2023-05-02 RX ORDER — MEMANTINE HYDROCHLORIDE 10 MG/1
TABLET ORAL 2 TIMES DAILY
COMMUNITY
End: 2023-05-02 | Stop reason: SDUPTHER

## 2023-05-02 RX ORDER — ROPINIROLE 0.5 MG/1
1 TABLET, FILM COATED ORAL NIGHTLY
COMMUNITY
Start: 2021-04-08 | End: 2023-05-02 | Stop reason: SDUPTHER

## 2023-05-02 RX ORDER — ASPIRIN 81 MG/1
1 TABLET ORAL DAILY
COMMUNITY

## 2023-05-02 ASSESSMENT — ENCOUNTER SYMPTOMS
DIARRHEA: 0
NERVOUS/ANXIOUS: 0
HEADACHES: 0
BRUISES/BLEEDS EASILY: 0
MYALGIAS: 0
OCCASIONAL FEELINGS OF UNSTEADINESS: 0
DIFFICULTY URINATING: 0
CHEST TIGHTNESS: 0
NUMBNESS: 0
COUGH: 0
NAUSEA: 0
DIZZINESS: 0
DYSPHORIC MOOD: 0
HEMATURIA: 0
SHORTNESS OF BREATH: 0
WEAKNESS: 0
BACK PAIN: 0
ADENOPATHY: 0
ABDOMINAL PAIN: 0
NECK PAIN: 0
LOSS OF SENSATION IN FEET: 0
SORE THROAT: 0
EYE DISCHARGE: 0
FATIGUE: 0
BLOOD IN STOOL: 0
ACTIVITY CHANGE: 0
ARTHRALGIAS: 0
CONSTIPATION: 0
DEPRESSION: 0
VOMITING: 0

## 2023-05-02 ASSESSMENT — ANXIETY QUESTIONNAIRES
GAD7 TOTAL SCORE: 9
IF YOU CHECKED OFF ANY PROBLEMS ON THIS QUESTIONNAIRE, HOW DIFFICULT HAVE THESE PROBLEMS MADE IT FOR YOU TO DO YOUR WORK, TAKE CARE OF THINGS AT HOME, OR GET ALONG WITH OTHER PEOPLE: SOMEWHAT DIFFICULT
3. WORRYING TOO MUCH ABOUT DIFFERENT THINGS: MORE THAN HALF THE DAYS
5. BEING SO RESTLESS THAT IT IS HARD TO SIT STILL: NOT AT ALL
4. TROUBLE RELAXING: NOT AT ALL
7. FEELING AFRAID AS IF SOMETHING AWFUL MIGHT HAPPEN: NOT AT ALL
1. FEELING NERVOUS, ANXIOUS, OR ON EDGE: NEARLY EVERY DAY
6. BECOMING EASILY ANNOYED OR IRRITABLE: MORE THAN HALF THE DAYS
2. NOT BEING ABLE TO STOP OR CONTROL WORRYING: MORE THAN HALF THE DAYS

## 2023-05-02 NOTE — PROGRESS NOTES
Subjective   Patient ID: Jodi Nuñez is a 82 y.o. female who presents for 3 month follow up on Anxiety and Hypertension.    OARRS:  No data recorded  I have personally reviewed the OARRS report for Jodi Nuñez. I have considered the risks of abuse, dependence, addiction and diversion    Is the patient prescribed a combination of a benzodiazepine and opioid?  No    Last Urine Drug Screen / ordered today: Yes  No results found for this or any previous visit (from the past 21298 hour(s)).  N/A    Controlled Substance Agreement:  Date of the Last Agreement: 2023  Reviewed Controlled Substance Agreement including but not limited to the benefits, risks, and alternatives to treatment with a Controlled Substance medication(s).    Benzodiazepines:  What is the patient's goal of therapy? LESS STRESS  Is this being achieved with current treatment? yes    ROSAS-7:  Over the last 2 weeks, how often have you been bothered by any of the following problems?  Feeling nervous, anxious, or on edge: 3  Not being able to stop or control worryin  Worrying too much about different things: 2  Trouble relaxin  Being so restless that it is hard to sit still: 0  Becoming easily annoyed or irritable: 2  Feeling afraid as if something awful might happen: 0  ROSAS-7 Total Score: 9        Activities of Daily Living:   Is your overall impression that this patient is benefiting (symptom reduction outweighs side effects) from benzodiazepine therapy? Yes     1. Physical Functioning: Same  2. Family Relationship: Same  3. Social Relationship: Same  4. Mood: Same  5. Sleep Patterns: Same  6. Overall Function: Same    HPI  Dementia stable  Medicines help  Seen by neuro    Anxiety ongoing  Medicines help  Discussed risks and benefits  Stay current with CSA and urine drug screen    Coronary disease stable no angina  Follow-up with cardio    A-fib stable no syncope follow with cardio    Status post renal pelvis cancer keep follow-up with  "urology    Patient here today with daughter  Review of Systems   Constitutional:  Negative for activity change and fatigue.   HENT:  Negative for congestion and sore throat.    Eyes:  Negative for discharge.   Respiratory:  Negative for cough, chest tightness and shortness of breath.    Cardiovascular:  Negative for chest pain and leg swelling.   Gastrointestinal:  Negative for abdominal pain, blood in stool, constipation, diarrhea, nausea and vomiting.   Endocrine: Negative for cold intolerance and heat intolerance.   Genitourinary:  Negative for difficulty urinating and hematuria.   Musculoskeletal:  Negative for arthralgias, back pain, gait problem, myalgias and neck pain.   Allergic/Immunologic: Negative for environmental allergies.   Neurological:  Negative for dizziness, syncope, weakness, numbness and headaches.   Hematological:  Negative for adenopathy. Does not bruise/bleed easily.   Psychiatric/Behavioral:  Negative for dysphoric mood. The patient is not nervous/anxious.    All other systems reviewed and are negative.      Objective   /72 (BP Location: Right arm, BP Cuff Size: Small adult)   Pulse 64   Temp 36.7 °C (98.1 °F)   Ht 1.549 m (5' 1\")   Wt 55.6 kg (122 lb 9.6 oz)   SpO2 95%   BMI 23.17 kg/m²    Physical Exam  Vitals and nursing note reviewed.   Constitutional:       General: She is not in acute distress.     Appearance: Normal appearance.   HENT:      Head: Normocephalic and atraumatic.      Right Ear: Tympanic membrane, ear canal and external ear normal.      Left Ear: Tympanic membrane, ear canal and external ear normal.      Nose: Nose normal.      Mouth/Throat:      Mouth: Mucous membranes are moist.      Pharynx: Oropharynx is clear. No oropharyngeal exudate or posterior oropharyngeal erythema.   Eyes:      Extraocular Movements: Extraocular movements intact.      Conjunctiva/sclera: Conjunctivae normal.      Pupils: Pupils are equal, round, and reactive to light. "   Cardiovascular:      Rate and Rhythm: Normal rate and regular rhythm.      Pulses: Normal pulses.      Heart sounds: Normal heart sounds. No murmur heard.  Pulmonary:      Effort: Pulmonary effort is normal. No respiratory distress.      Breath sounds: Normal breath sounds. No wheezing or rales.   Abdominal:      General: Abdomen is flat. Bowel sounds are normal. There is no distension.      Palpations: Abdomen is soft. There is no mass.      Tenderness: There is no abdominal tenderness.   Musculoskeletal:         General: No swelling or deformity. Normal range of motion.      Cervical back: Normal range of motion and neck supple.      Right lower leg: No edema.      Left lower leg: No edema.   Lymphadenopathy:      Cervical: No cervical adenopathy.   Skin:     General: Skin is warm and dry.      Capillary Refill: Capillary refill takes less than 2 seconds.      Findings: No lesion or rash.   Neurological:      General: No focal deficit present.      Mental Status: She is alert and oriented to person, place, and time.      Cranial Nerves: No cranial nerve deficit.      Motor: No weakness.   Psychiatric:         Mood and Affect: Mood normal.         Behavior: Behavior normal.         Thought Content: Thought content normal.         Judgment: Judgment normal.         Assessment/Plan   Problem List Items Addressed This Visit          Nervous    Dementia (CMS/HCC)       Circulatory    Atrial fibrillation (CMS/HCC)    Relevant Medications    amLODIPine (Norvasc) 5 mg tablet    Coronary artery disease involving native coronary artery of native heart without angina pectoris - Primary    Relevant Medications    amLODIPine (Norvasc) 5 mg tablet    Essential hypertension       Genitourinary    Chronic kidney disease, stage 3a    Malignant tumor renal pelvis (CMS/HCC)       Other    Generalized anxiety disorder    Mixed hyperlipidemia       Patient education provided.  Stay current with age appropriate health maintenance as  instructed.  Appointment here or ER with new or worsening symptoms'  Keep appropriate follow-up visit.  Stay current with proper immunizations   Follow with specialist 3-month recheck return sooner with new or worsening symptoms  Stay current with urine drug screen and CSA

## 2023-05-20 DIAGNOSIS — F41.1 GENERALIZED ANXIETY DISORDER: Primary | ICD-10-CM

## 2023-05-23 RX ORDER — ALPRAZOLAM 0.5 MG/1
0.5 TABLET ORAL 3 TIMES DAILY PRN
Qty: 90 TABLET | Refills: 0 | Status: SHIPPED | OUTPATIENT
Start: 2023-05-23 | End: 2023-05-31 | Stop reason: SDUPTHER

## 2023-05-24 DIAGNOSIS — F41.1 GENERALIZED ANXIETY DISORDER: ICD-10-CM

## 2023-05-24 DIAGNOSIS — E55.9 VITAMIN D DEFICIENCY: ICD-10-CM

## 2023-05-25 NOTE — TELEPHONE ENCOUNTER
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       Received a fax from [facility] requesting refill for the patient's prescription of Vitamin D. Medication has been pended to the provider for approval.     LV: 5/2/23  NV: 8/3/23

## 2023-05-28 RX ORDER — ASPIRIN 325 MG
50000 TABLET, DELAYED RELEASE (ENTERIC COATED) ORAL
Qty: 4 CAPSULE | Refills: 0 | Status: SHIPPED | OUTPATIENT
Start: 2023-05-28 | End: 2023-07-19 | Stop reason: SDUPTHER

## 2023-05-28 RX ORDER — SERTRALINE HYDROCHLORIDE 100 MG/1
100 TABLET, FILM COATED ORAL DAILY
Qty: 30 TABLET | Refills: 0 | Status: SHIPPED | OUTPATIENT
Start: 2023-05-28 | End: 2023-05-30 | Stop reason: SDUPTHER

## 2023-05-30 DIAGNOSIS — F41.1 GENERALIZED ANXIETY DISORDER: ICD-10-CM

## 2023-05-30 RX ORDER — SERTRALINE HYDROCHLORIDE 100 MG/1
100 TABLET, FILM COATED ORAL 2 TIMES DAILY
Qty: 60 TABLET | Refills: 1 | Status: SHIPPED | OUTPATIENT
Start: 2023-05-30 | End: 2023-05-31 | Stop reason: SDUPTHER

## 2023-05-30 NOTE — TELEPHONE ENCOUNTER
"Jodi's daughter called in because her mom's Sertraline prescription was written incorrectly, stated she is supposed to say \"take 2 pills a day\", it was only written for \"1 pill a day\", stated that she still needs the rest of her medication. To be sent to [facility] in [place].    Please advise.   "

## 2023-05-31 DIAGNOSIS — F41.1 GENERALIZED ANXIETY DISORDER: ICD-10-CM

## 2023-05-31 NOTE — TELEPHONE ENCOUNTER
Rx Refill Request Telephone Encounter    Name:  Jodi Nuñez  :  [id number]  Medication Name:  Alprazolam & Sertraline  Specific Pharmacy location: [facility] in [facility]  Date of last appointment:  23  Date of next appointment:  8-3-23  Best number to reach patient:  [phone]       Jodi's daughter stated that [facility] never received the sertraline script, may need to resend.

## 2023-06-01 RX ORDER — SERTRALINE HYDROCHLORIDE 100 MG/1
100 TABLET, FILM COATED ORAL 2 TIMES DAILY
Qty: 60 TABLET | Refills: 1 | Status: SHIPPED | OUTPATIENT
Start: 2023-06-01 | End: 2023-09-21 | Stop reason: SDUPTHER

## 2023-06-01 RX ORDER — ALPRAZOLAM 0.5 MG/1
0.5 TABLET ORAL 3 TIMES DAILY PRN
Qty: 90 TABLET | Refills: 0 | Status: SHIPPED | OUTPATIENT
Start: 2023-06-01 | End: 2023-06-29 | Stop reason: SDUPTHER

## 2023-06-29 DIAGNOSIS — F41.1 GENERALIZED ANXIETY DISORDER: ICD-10-CM

## 2023-06-29 RX ORDER — ALPRAZOLAM 0.5 MG/1
0.5 TABLET ORAL 3 TIMES DAILY PRN
Qty: 90 TABLET | Refills: 0 | Status: SHIPPED | OUTPATIENT
Start: 2023-06-29 | End: 2023-07-28 | Stop reason: SDUPTHER

## 2023-06-29 NOTE — TELEPHONE ENCOUNTER
Rx Refill Request     Name: Jodi Nuñez  :  1940   Medication Name:    Alprazolam 0.5 MG-Take 1 tablet by mouth three times daily prn anxiety.  Specific Pharmacy location:  [facility]  Date of last appointment:  2023   Date of next appointment:  8/3/2023   Best number to reach patient:  [phone]       The daughter Arminda may be called with any questions or concerns.

## 2023-07-19 DIAGNOSIS — E55.9 VITAMIN D DEFICIENCY: ICD-10-CM

## 2023-07-19 RX ORDER — ASPIRIN 325 MG
50000 TABLET, DELAYED RELEASE (ENTERIC COATED) ORAL
Qty: 4 CAPSULE | Refills: 0 | Status: SHIPPED | OUTPATIENT
Start: 2023-07-19 | End: 2023-08-23 | Stop reason: SDUPTHER

## 2023-07-19 NOTE — TELEPHONE ENCOUNTER
Rx Refill Request Telephone Encounter    Name:  Jodi Nuñez  :  [id number]  Medication Name: vitamin D   Specific Pharmacy location:  [facility]  Date of last appointment: 23  Date of next appointment:  8-3-23  Best number to reach patient:  [phone]

## 2023-07-19 NOTE — TELEPHONE ENCOUNTER
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     Medication has been pended to provider for approval.     LV: 5/2/2023   NV:  8/3/2023

## 2023-07-28 DIAGNOSIS — F41.1 GENERALIZED ANXIETY DISORDER: ICD-10-CM

## 2023-07-28 RX ORDER — ALPRAZOLAM 0.5 MG/1
0.5 TABLET ORAL 3 TIMES DAILY PRN
Qty: 90 TABLET | Refills: 0 | Status: SHIPPED | OUTPATIENT
Start: 2023-07-28 | End: 2023-09-07 | Stop reason: SDUPTHER

## 2023-07-28 NOTE — TELEPHONE ENCOUNTER
Gastroenterology Consult Note     Patient: Chuyita Dawson Date: 2/25/2020   YOB: 1977 Admission Date: 2/24/2020   MRN: [id number] Attending: TYLER GROVES     Current hospital day:  Hospital Day: 2    Consulting provider: TYLER GROVES MD and Mariaa Chaney MD     Consulted for:  Suspected acute pancreatitis at the request of Rika Britton MD    HISTORY OF PRESENT ILLNESS:    The patient is a 42 year old female with hx of recurrent idiopathic pancreatitis, pancreatic pseudocyst, progressive weight loss, who presents to ED on 2/24/2020 from GI office complaining of worsening abdominal pain associated with nausea and vomiting of one day duration. Of note, she was recently admitted in 01/23 for similar symptoms. GI was also consulted for recurrent history and CT findings of pseudocyst.  She denies any significant alcohol use, drug use, is not on any medications, and does not have any family history of pancreatitis. CT abdomen then showed showed multiple cystic lesions within the region of the pancreatic head, the largest of which measures 4.2 cm and suggestive of pseudocyst. EUS was also done which demonstrated an area of polypoid tissue within the 2nd and 3rd portion of the duodenum concerning for invasive process versus adenoma, multiple biopsies were obtained. Pathology result was essentially normal small bowel mucosa. CA 19-9 and IgG 4 were checked and were normal.   Labs in current admission wee all normal with mild elevation of lipases to 442. All along, there has never been lipase 3 times normal since 2019.     PAST MEDICAL HISTORY:  Past Medical History:   Diagnosis Date   • Pancreatic cyst        PAST SURGICAL HISTORY:  No past surgical history on file.    PAST FAMILY HISTORY:  No family history on file.    PAST SOCIAL HISTORY:  Social History     Socioeconomic History   • Marital status: Single     Spouse name: Not on file   • Number of children: Not on file   • Years of education: Not on file  Rx Refill Request Telephone Encounter    Name:  Jodi Nuñez  :  [id number]  Medication Name:  Alprazolam (Xanax) 0.5 mg   Specific Pharmacy location:  [facility]  Date of last appointment:    Date of next appointment:  8/3  Best number to reach patient:  [phone]                • Highest education level: Not on file   Occupational History   • Not on file   Social Needs   • Financial resource strain: Not on file   • Food insecurity:     Worry: Not on file     Inability: Not on file   • Transportation needs:     Medical: Not on file     Non-medical: Not on file   Tobacco Use   • Smoking status: Current Every Day Smoker     Packs/day: 0.25     Types: Cigarettes     Start date: 1998   • Smokeless tobacco: Never Used   Substance and Sexual Activity   • Alcohol use: No   • Drug use: Yes     Types: Marijuana     Comment: last time was 2/8/2020   • Sexual activity: Yes     Partners: Male     Birth control/protection: Injection     Comment: depo    Lifestyle   • Physical activity:     Days per week: Not on file     Minutes per session: Not on file   • Stress: Not on file   Relationships   • Social connections:     Talks on phone: Not on file     Gets together: Not on file     Attends Rastafarian service: Not on file     Active member of club or organization: Not on file     Attends meetings of clubs or organizations: Not on file     Relationship status: Not on file   • Intimate partner violence:     Fear of current or ex partner: Not on file     Emotionally abused: Not on file     Physically abused: Not on file     Forced sexual activity: Not on file   Other Topics Concern   • Not on file   Social History Narrative   • Not on file       ACTIVE MEDS:  Current Facility-Administered Medications   Medication Dose Route Frequency Provider Last Rate Last Dose   • oxyCODONE (IMM REL) (ROXICODONE) tablet 5 mg  5 mg Oral Q6H PRN Rika Britton MD       • pantoprazole (PROTONIX) injection 40 mg  40 mg Intravenous Daily Rika Britton MD   40 mg at 02/25/20 1027   • amylase-lipase-protease 39,150-10,440-39,150 units (VIOKACE) per tablet 2 tablet  2 tablet Oral TID  Rika Britton MD       • sodium chloride 0.9 % flush bag 25 mL  25 mL Intravenous PRN Rika Britton MD       • sodium chloride (PF)  0.9 % injection 2 mL  2 mL Intracatheter 2 times per day Rika Britton MD   2 mL at 02/24/20 2127   • heparin (porcine) injection 5,000 Units  5,000 Units Subcutaneous 3 times per day Rika Britton MD   5,000 Units at 02/25/20 0613   • dextrose 5 % / sodium chloride 0.9% infusion   Intravenous Continuous Rika Britton  mL/hr at 02/25/20 0811     • ondansetron (ZOFRAN) injection 4 mg  4 mg Intravenous Q12H PRN Rika Britton MD       • acetaminophen (TYLENOL) tablet 650 mg  650 mg Oral Q6H PRN Rika Britton MD       • traMADol (ULTRAM) tablet 50 mg  50 mg Oral Q8H PRN Rika Britton MD       • morphine injection 2-4 mg  2-4 mg Intravenous Q4H PRN Rika Britton MD   4 mg at 02/25/20 1013   • sodium chloride (NORMAL SALINE) 0.9 % bolus 500 mL  500 mL Intravenous PRN Rika Britton MD           REVIEW OF SYSTEMS:  A focused review of systems was performed and found to be negative except for what was stated in the HPI (History of Present Illness).    PHYSICAL EXAM:  Visit Vitals  BP (!) 118/94 (BP Location: RUE - Right upper extremity, Patient Position: Semi-Walker's)   Pulse 97   Temp 97 °F (36.1 °C) (Oral)   Resp 18   Ht 5' 6\" (1.676 m)   Wt 42.5 kg   LMP 01/16/2020   SpO2 100%   BMI 15.12 kg/m²     General: lying comfortably, no acute distress  Head: Atraumatic, normocephalic  Eyes: No scleral icterus, Pupils equal and reactive to light  ENT: Oral mucosa is moist and clear  Skin: Exposed areas appear grossly normal. No jaundice  Cardiovascular: RRR (regular rate and rhythm), no murmur  Lungs: Normal breath sounds bilaterally  Abdomen: Soft, nondistended, epigastric tenderness to palpation, normal bowel sounds   Extremities: No pedal edema, rashes or ulcers  Neurologic: No asterixis, non-focal neurologic exam      LABORATORY DATA  Lab Results   Component Value Date    SODIUM 137 02/24/2020    POTASSIUM 3.6 02/24/2020    CHLORIDE 98 02/24/2020    CO2 25 02/24/2020    GLUCOSE 98 02/24/2020     BUN 9 02/24/2020    CREATININE 0.54 02/24/2020    ALBUMIN 4.2 02/24/2020    BILIRUBIN 0.6 02/24/2020    LACTA 1.0 02/24/2020    AST 9 02/24/2020     Lab Results   Component Value Date    WBC 6.7 02/24/2020    HGB 15.9 (H) 02/24/2020    HCT 45.6 02/24/2020     02/24/2020    TSH 1.386 01/25/2020         IMAGING  MRI ABDOMEN W WO CONTRAST    (Results Pending)       Colonoscopy none  EUS   Postoperative Diagnoses:   1. Normal appearing esophagus  2. Normal appearing stomach  3. There is an area of polypoid tissue within the 2nd and 3rd portion of the duodenum concerning for invasive process versus adenoma, multiple biopsies obtained.  This area may have been overlying the papilla, however given the large carpeted appearance of this lesion, the papilla was not readily identified on forward viewing endoscopy           ASSESSMENT AND RECOMMENDATIONS:  The patient is a 42 year old female with hx of recurrent idiopathic pancreatitis, pancreatic pseudocyst, progressive weight loss, who presents to ED on 2/24/2020 from GI office complaining of worsening abdominal pain associated with nausea and vomiting of one day duration    # Recurrent abdominal pain presumed to be 2/2 pancreatitis/pseudocyst: Present since 11/2019. Clinical hx, CT abdomen and MRCP all suggestive of that. It is likely idiopathic as no significant EtOH hx or gall stone disease. CA19 9 and IgG 4 were also negative. No medication, high triglyceride or ca level identified. She had EUS in her last admission 01/23/20 and polypoid masses were removed from second and third portion of duodenum but pathology essentially showed normal small bowel mucosa  - Lipase mildly elevated to 422, not even 2 fold  - MRI abdomen ordered  - Pain control and antiemetic per primary team      Thank you for involving us in your patient's care and we will follow along with you.    Patient discussed with my attending,     Mariaa Chaney MD  Internal Medicine,  PGY-2  [phone]  I was present with the resident during the history and exam. I discussed the case with Dr. Chaney and agree with the findings and plan as documented in the resident's note. MRCP results.    Fay Chowdhury MD

## 2023-07-28 NOTE — TELEPHONE ENCOUNTER
Controlled Medication Only  Date of the Last Drug Screen: 5/2/2023  Date of the Last Agreement: 5/2/2023    Last fill 30 day supply on 6/30/2023

## 2023-07-31 PROBLEM — K59.00 CONSTIPATION: Status: ACTIVE | Noted: 2023-07-31

## 2023-07-31 PROBLEM — R10.9 ABDOMINAL PAIN: Status: ACTIVE | Noted: 2023-07-31

## 2023-07-31 PROBLEM — R25.1 TREMOR: Status: ACTIVE | Noted: 2023-07-31

## 2023-07-31 PROBLEM — N39.0 URINARY TRACT INFECTION: Status: ACTIVE | Noted: 2023-07-31

## 2023-08-03 ENCOUNTER — APPOINTMENT (OUTPATIENT)
Dept: PRIMARY CARE | Facility: CLINIC | Age: 83
End: 2023-08-03
Payer: MEDICARE

## 2023-08-04 ENCOUNTER — TELEMEDICINE (OUTPATIENT)
Dept: PRIMARY CARE | Facility: CLINIC | Age: 83
End: 2023-08-04
Payer: MEDICARE

## 2023-08-04 VITALS — BODY MASS INDEX: 23.17 KG/M2 | HEIGHT: 61 IN

## 2023-08-04 DIAGNOSIS — I10 ESSENTIAL HYPERTENSION: ICD-10-CM

## 2023-08-04 DIAGNOSIS — I48.0 PAROXYSMAL ATRIAL FIBRILLATION (MULTI): Primary | ICD-10-CM

## 2023-08-04 DIAGNOSIS — F41.1 GENERALIZED ANXIETY DISORDER: ICD-10-CM

## 2023-08-04 DIAGNOSIS — I25.10 CORONARY ARTERY DISEASE INVOLVING NATIVE CORONARY ARTERY OF NATIVE HEART WITHOUT ANGINA PECTORIS: ICD-10-CM

## 2023-08-04 DIAGNOSIS — E55.9 VITAMIN D DEFICIENCY: ICD-10-CM

## 2023-08-04 DIAGNOSIS — F17.200 CURRENT EVERY DAY SMOKER: ICD-10-CM

## 2023-08-04 DIAGNOSIS — C65.1 MALIGNANT NEOPLASM OF RIGHT RENAL PELVIS (MULTI): ICD-10-CM

## 2023-08-04 DIAGNOSIS — G30.0 MODERATE EARLY ONSET ALZHEIMER'S DEMENTIA WITH ANXIETY (MULTI): ICD-10-CM

## 2023-08-04 DIAGNOSIS — F02.B4 MODERATE EARLY ONSET ALZHEIMER'S DEMENTIA WITH ANXIETY (MULTI): ICD-10-CM

## 2023-08-04 DIAGNOSIS — E78.2 MIXED HYPERLIPIDEMIA: ICD-10-CM

## 2023-08-04 PROBLEM — E78.00 HYPERCHOLESTEROLEMIA: Status: RESOLVED | Noted: 2023-05-02 | Resolved: 2023-08-04

## 2023-08-04 PROCEDURE — 99443 PR PHYS/QHP TELEPHONE EVALUATION 21-30 MIN: CPT | Performed by: FAMILY MEDICINE

## 2023-08-04 ASSESSMENT — ENCOUNTER SYMPTOMS
ACTIVITY CHANGE: 0
WEAKNESS: 0
SHORTNESS OF BREATH: 0
FATIGUE: 0
EYE DISCHARGE: 0
MYALGIAS: 0
HEMATURIA: 0
NAUSEA: 0
DIFFICULTY URINATING: 0
BACK PAIN: 0
BRUISES/BLEEDS EASILY: 0
HEADACHES: 0
BLOOD IN STOOL: 0
DIZZINESS: 0
ABDOMINAL PAIN: 0
VOMITING: 0
NERVOUS/ANXIOUS: 0
NECK PAIN: 0
DIARRHEA: 0
CONSTIPATION: 0
ARTHRALGIAS: 0
NUMBNESS: 0
SORE THROAT: 0
CHEST TIGHTNESS: 0
DYSPHORIC MOOD: 0
ADENOPATHY: 0
COUGH: 0

## 2023-08-04 ASSESSMENT — ANXIETY QUESTIONNAIRES
GAD7 TOTAL SCORE: 2
6. BECOMING EASILY ANNOYED OR IRRITABLE: NOT AT ALL
5. BEING SO RESTLESS THAT IT IS HARD TO SIT STILL: NOT AT ALL
7. FEELING AFRAID AS IF SOMETHING AWFUL MIGHT HAPPEN: NOT AT ALL
3. WORRYING TOO MUCH ABOUT DIFFERENT THINGS: SEVERAL DAYS
2. NOT BEING ABLE TO STOP OR CONTROL WORRYING: SEVERAL DAYS
IF YOU CHECKED OFF ANY PROBLEMS ON THIS QUESTIONNAIRE, HOW DIFFICULT HAVE THESE PROBLEMS MADE IT FOR YOU TO DO YOUR WORK, TAKE CARE OF THINGS AT HOME, OR GET ALONG WITH OTHER PEOPLE: NOT DIFFICULT AT ALL
4. TROUBLE RELAXING: NOT AT ALL
1. FEELING NERVOUS, ANXIOUS, OR ON EDGE: NOT AT ALL

## 2023-08-04 NOTE — PROGRESS NOTES
Subjective   Patient ID: Jodi Nuñez is a 83 y.o. female who presents for 3 month follow up on Anxiety and Hypertension.    OARRS:  No data recorded  I have personally reviewed the OARRS report for Jodi Nuñez. I have considered the risks of abuse, dependence, addiction and diversion and I believe that it is clinically appropriate for Jodi Nuñez to be prescribed this medication    Is the patient prescribed a combination of a benzodiazepine and opioid?  No    Last Urine Drug Screen / ordered today: No  No results found for this or any previous visit (from the past 31407 hour(s)).  Results are as expected.  Done 5/2/23    Controlled Substance Agreement:  Date of the Last Agreement: 5/2/23  Reviewed Controlled Substance Agreement including but not limited to the benefits, risks, and alternatives to treatment with a Controlled Substance medication(s).    Benzodiazepines:  What is the patient's goal of therapy? Less stress  Is this being achieved with current treatment? yes    ROSAS-7:  No data recorded      Activities of Daily Living:   Is your overall impression that this patient is benefiting (symptom reduction outweighs side effects) from benzodiazepine therapy? Yes     1. Physical Functioning: Same  2. Family Relationship: Same  3. Social Relationship: Same  4. Mood: Same  5. Sleep Patterns: Same  6. Overall Function: Same    HPI  Virtual phone visit    In general doing well  Anxiety stable  Medicine helpful    Dementia stable  Doing well at home  Getting care from family    Coronary disease and atrial fibrillation stable  No chest pain  Keep cardio follow-up    Hypertension stable no chest pain or shortness of breath    High cholesterol stable watch diet    Low vitamin D  Replace    Renal pelvis neoplasm keep follow-up with specialist    Patient is a smoker and should quit  We discussed risks  Review of Systems   Constitutional:  Negative for activity change and fatigue.   HENT:  Negative for congestion and sore  "throat.    Eyes:  Negative for discharge.   Respiratory:  Negative for cough, chest tightness and shortness of breath.    Cardiovascular:  Negative for chest pain and leg swelling.   Gastrointestinal:  Negative for abdominal pain, blood in stool, constipation, diarrhea, nausea and vomiting.   Endocrine: Negative for cold intolerance and heat intolerance.   Genitourinary:  Negative for difficulty urinating and hematuria.   Musculoskeletal:  Negative for arthralgias, back pain, gait problem, myalgias and neck pain.   Allergic/Immunologic: Negative for environmental allergies.   Neurological:  Negative for dizziness, syncope, weakness, numbness and headaches.   Hematological:  Negative for adenopathy. Does not bruise/bleed easily.   Psychiatric/Behavioral:  Negative for dysphoric mood. The patient is not nervous/anxious.    All other systems reviewed and are negative.      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  Stay current with age appropriate health maintenance as instructed.  Appointment here or ER with new or worsening symptoms'  Keep appropriate follow-up visit.  Stay current with proper immunizations   3 months  D/w daughter  Keep follow-up with specialist  Report suicidal ideation report psychotic or manic symptoms  "

## 2023-08-23 DIAGNOSIS — E55.9 VITAMIN D DEFICIENCY: ICD-10-CM

## 2023-08-23 DIAGNOSIS — F41.1 GENERALIZED ANXIETY DISORDER: ICD-10-CM

## 2023-08-23 DIAGNOSIS — I10 ESSENTIAL HYPERTENSION: ICD-10-CM

## 2023-08-23 NOTE — TELEPHONE ENCOUNTER
Rx Refill Request     Name: Jodi Nuñez  :  1940   Medication Name:  Buspar, Losartan and vitamin D3  Specific Pharmacy location:  [facility]  Date of last appointment:  2023  Date of next appointment:  None   Best number to reach patient:  [phone]          Rx pending for NK approval.

## 2023-08-24 RX ORDER — ASPIRIN 325 MG
50000 TABLET, DELAYED RELEASE (ENTERIC COATED) ORAL
Qty: 4 CAPSULE | Refills: 0 | Status: SHIPPED | OUTPATIENT
Start: 2023-08-24 | End: 2023-09-21 | Stop reason: SDUPTHER

## 2023-08-24 RX ORDER — BUSPIRONE HYDROCHLORIDE 10 MG/1
10 TABLET ORAL 3 TIMES DAILY
Qty: 90 TABLET | Refills: 3 | Status: SHIPPED | OUTPATIENT
Start: 2023-08-24 | End: 2024-01-02 | Stop reason: SDUPTHER

## 2023-08-24 RX ORDER — LOSARTAN POTASSIUM 25 MG/1
25 TABLET ORAL 2 TIMES DAILY
Qty: 180 TABLET | Refills: 3 | Status: SHIPPED | OUTPATIENT
Start: 2023-08-24

## 2023-09-07 DIAGNOSIS — F41.1 GENERALIZED ANXIETY DISORDER: ICD-10-CM

## 2023-09-07 RX ORDER — ALPRAZOLAM 0.5 MG/1
0.5 TABLET ORAL 3 TIMES DAILY PRN
Qty: 90 TABLET | Refills: 0 | Status: SHIPPED | OUTPATIENT
Start: 2023-09-07 | End: 2023-10-17 | Stop reason: SDUPTHER

## 2023-09-07 NOTE — TELEPHONE ENCOUNTER
Rx Refill Request Telephone Encounter    Name:  Jodi Nuñez  :  [id number]  Medication Name:  xanax 0.5mg     Specific Pharmacy location:  [facility]  Date of last appointment:  8/3/23  Date of next appointment:  na  Best number to reach patient:  [phone]

## 2023-09-12 PROBLEM — N20.0 BILATERAL RENAL STONES: Status: ACTIVE | Noted: 2023-09-12

## 2023-09-12 PROBLEM — Z72.0 TOBACCO ABUSE: Status: ACTIVE | Noted: 2023-09-12

## 2023-09-21 ENCOUNTER — OFFICE VISIT (OUTPATIENT)
Dept: PRIMARY CARE | Facility: CLINIC | Age: 83
End: 2023-09-21
Payer: MEDICARE

## 2023-09-21 VITALS
HEIGHT: 61 IN | HEART RATE: 62 BPM | BODY MASS INDEX: 23.26 KG/M2 | OXYGEN SATURATION: 96 % | SYSTOLIC BLOOD PRESSURE: 110 MMHG | DIASTOLIC BLOOD PRESSURE: 70 MMHG | WEIGHT: 123.2 LBS | TEMPERATURE: 98 F

## 2023-09-21 DIAGNOSIS — F02.B0 MODERATE LATE ONSET ALZHEIMER'S DEMENTIA WITHOUT BEHAVIORAL DISTURBANCE, PSYCHOTIC DISTURBANCE, MOOD DISTURBANCE, OR ANXIETY (MULTI): ICD-10-CM

## 2023-09-21 DIAGNOSIS — F41.1 GENERALIZED ANXIETY DISORDER: ICD-10-CM

## 2023-09-21 DIAGNOSIS — E55.9 VITAMIN D DEFICIENCY: Primary | ICD-10-CM

## 2023-09-21 DIAGNOSIS — M25.50 ARTHRALGIA, UNSPECIFIED JOINT: ICD-10-CM

## 2023-09-21 DIAGNOSIS — G30.1 MODERATE LATE ONSET ALZHEIMER'S DEMENTIA WITHOUT BEHAVIORAL DISTURBANCE, PSYCHOTIC DISTURBANCE, MOOD DISTURBANCE, OR ANXIETY (MULTI): ICD-10-CM

## 2023-09-21 DIAGNOSIS — R27.0 ATAXIA: ICD-10-CM

## 2023-09-21 DIAGNOSIS — R22.9 SKIN NODULE: ICD-10-CM

## 2023-09-21 PROBLEM — L98.9 SKIN LESION: Status: ACTIVE | Noted: 2023-09-21

## 2023-09-21 PROCEDURE — 3074F SYST BP LT 130 MM HG: CPT | Performed by: FAMILY MEDICINE

## 2023-09-21 PROCEDURE — 3078F DIAST BP <80 MM HG: CPT | Performed by: FAMILY MEDICINE

## 2023-09-21 PROCEDURE — 1159F MED LIST DOCD IN RCRD: CPT | Performed by: FAMILY MEDICINE

## 2023-09-21 PROCEDURE — 99214 OFFICE O/P EST MOD 30 MIN: CPT | Performed by: FAMILY MEDICINE

## 2023-09-21 PROCEDURE — 1160F RVW MEDS BY RX/DR IN RCRD: CPT | Performed by: FAMILY MEDICINE

## 2023-09-21 RX ORDER — MEMANTINE HYDROCHLORIDE 10 MG/1
10 TABLET ORAL 2 TIMES DAILY
Qty: 180 TABLET | Refills: 3 | Status: SHIPPED | OUTPATIENT
Start: 2023-09-21

## 2023-09-21 RX ORDER — ASPIRIN 325 MG
50000 TABLET, DELAYED RELEASE (ENTERIC COATED) ORAL
Qty: 12 CAPSULE | Refills: 3 | Status: SHIPPED | OUTPATIENT
Start: 2023-09-21 | End: 2024-05-16 | Stop reason: SDUPTHER

## 2023-09-21 RX ORDER — ASPIRIN 325 MG
50000 TABLET, DELAYED RELEASE (ENTERIC COATED) ORAL
Qty: 4 CAPSULE | Refills: 0 | Status: SHIPPED | OUTPATIENT
Start: 2023-09-21 | End: 2023-09-21 | Stop reason: ALTCHOICE

## 2023-09-21 RX ORDER — DONEPEZIL HYDROCHLORIDE 10 MG/1
10 TABLET, FILM COATED ORAL NIGHTLY
Qty: 90 TABLET | Refills: 3 | Status: SHIPPED | OUTPATIENT
Start: 2023-09-21

## 2023-09-21 RX ORDER — SERTRALINE HYDROCHLORIDE 100 MG/1
100 TABLET, FILM COATED ORAL 2 TIMES DAILY
Qty: 180 TABLET | Refills: 3 | Status: SHIPPED | OUTPATIENT
Start: 2023-09-21 | End: 2023-12-18 | Stop reason: SDUPTHER

## 2023-09-21 ASSESSMENT — ENCOUNTER SYMPTOMS
VOMITING: 0
MYALGIAS: 0
WEAKNESS: 0
BACK PAIN: 0
BLOOD IN STOOL: 0
HEMATURIA: 0
CHEST TIGHTNESS: 0
CONSTIPATION: 0
ADENOPATHY: 0
COUGH: 0
ARTHRALGIAS: 0
DIFFICULTY URINATING: 0
ABDOMINAL PAIN: 0
NERVOUS/ANXIOUS: 0
NUMBNESS: 0
NAUSEA: 0
SHORTNESS OF BREATH: 0
ACTIVITY CHANGE: 0
DIZZINESS: 0
EYE DISCHARGE: 0
BRUISES/BLEEDS EASILY: 0
NECK PAIN: 0
DIARRHEA: 0
HEADACHES: 0
SORE THROAT: 0
FATIGUE: 0
DYSPHORIC MOOD: 0

## 2023-09-21 NOTE — PROGRESS NOTES
"Subjective   Patient ID: Jodi Nuñez is a 83 y.o. female who presents for follow up on Nevus.    Has mole on her upper left arm that has gotten larger and thicker over the years.     Has skin tag on forehead, would like removed today.     Would like referral to physical therapy to help with walking.     HPI  Patient has a skin lesion of the left upper arm that has been enlarging and darkening  Should have removal    Low vitamin D  Replace    Alzheimer stable  Medicine helpful  Needs refill    Anxiety stable  Medicine helpful  Stay current with urine drug screen and CSA    Ataxia of gait  See physical therapy    Arthralgia  Multiple joints  Discussed options with patient and daughter  Refer to rheumatology  Review of Systems   Constitutional:  Negative for activity change and fatigue.   HENT:  Negative for congestion and sore throat.    Eyes:  Negative for discharge.   Respiratory:  Negative for cough, chest tightness and shortness of breath.    Cardiovascular:  Negative for chest pain and leg swelling.   Gastrointestinal:  Negative for abdominal pain, blood in stool, constipation, diarrhea, nausea and vomiting.   Endocrine: Negative for cold intolerance and heat intolerance.   Genitourinary:  Negative for difficulty urinating and hematuria.   Musculoskeletal:  Negative for arthralgias, back pain, gait problem, myalgias and neck pain.   Allergic/Immunologic: Negative for environmental allergies.   Neurological:  Negative for dizziness, syncope, weakness, numbness and headaches.   Hematological:  Negative for adenopathy. Does not bruise/bleed easily.   Psychiatric/Behavioral:  Negative for dysphoric mood. The patient is not nervous/anxious.    All other systems reviewed and are negative.      Objective   /70 (BP Location: Right arm, BP Cuff Size: Small adult)   Pulse 62   Temp 36.7 °C (98 °F)   Ht 1.549 m (5' 1\")   Wt 55.9 kg (123 lb 3.2 oz)   SpO2 96%   BMI 23.28 kg/m²    Physical Exam  Vitals and nursing " note reviewed.   Constitutional:       General: She is not in acute distress.     Appearance: Normal appearance.   HENT:      Head: Normocephalic and atraumatic.      Right Ear: Tympanic membrane, ear canal and external ear normal.      Left Ear: Tympanic membrane, ear canal and external ear normal.      Nose: Nose normal.      Mouth/Throat:      Mouth: Mucous membranes are moist.      Pharynx: Oropharynx is clear. No oropharyngeal exudate or posterior oropharyngeal erythema.   Eyes:      Extraocular Movements: Extraocular movements intact.      Conjunctiva/sclera: Conjunctivae normal.      Pupils: Pupils are equal, round, and reactive to light.   Cardiovascular:      Rate and Rhythm: Normal rate and regular rhythm.      Pulses: Normal pulses.      Heart sounds: Normal heart sounds. No murmur heard.  Pulmonary:      Effort: Pulmonary effort is normal. No respiratory distress.      Breath sounds: Normal breath sounds. No wheezing or rales.   Abdominal:      General: Abdomen is flat. Bowel sounds are normal. There is no distension.      Palpations: Abdomen is soft. There is no mass.      Tenderness: There is no abdominal tenderness.   Musculoskeletal:         General: No swelling or deformity. Normal range of motion.      Cervical back: Normal range of motion and neck supple.      Right lower leg: No edema.      Left lower leg: No edema.   Lymphadenopathy:      Cervical: No cervical adenopathy.   Skin:     General: Skin is warm and dry.      Capillary Refill: Capillary refill takes less than 2 seconds.      Findings: No lesion or rash.   Neurological:      General: No focal deficit present.      Mental Status: She is alert and oriented to person, place, and time.      Cranial Nerves: No cranial nerve deficit.      Motor: No weakness.   Psychiatric:         Mood and Affect: Mood normal.         Behavior: Behavior normal.         Thought Content: Thought content normal.         Judgment: Judgment normal.     1 cm darkly  pigmented skin lesion left lateral arm    Assessment/Plan   Problem List Items Addressed This Visit       Generalized anxiety disorder    Relevant Medications    sertraline (Zoloft) 100 mg tablet    Skin nodule    Relevant Orders    Referral to Plastic Surgery    Vitamin D deficiency - Primary    Relevant Medications    cholecalciferol (Vitamin D-3) 1,250 mcg (50,000 unit) capsule    Moderate late onset Alzheimer's dementia without behavioral disturbance, psychotic disturbance, mood disturbance, or anxiety (CMS/HCC)    Relevant Medications    donepezil (Aricept) 10 mg tablet    memantine (Namenda) 10 mg tablet    Other Relevant Orders    Follow Up In [facility] - PCP    Ataxia    Relevant Orders    Referral to Physical Therapy     Other Visit Diagnoses       Arthralgia, unspecified joint        Relevant Orders    Referral to Rheumatology            Patient education provided.  Stay current with age appropriate health maintenance as instructed.  Appointment here or ER with new or worsening symptoms'  Keep appropriate follow-up visit.  Stay current with proper immunizations   Refer to plastic surgeon  Refer to physical therapy  Refer to rheumatology  Recheck 3 months and as needed  Refills as above  Discussed at length with patient and daughter

## 2023-10-04 ENCOUNTER — APPOINTMENT (OUTPATIENT)
Dept: RHEUMATOLOGY | Facility: CLINIC | Age: 83
End: 2023-10-04
Payer: MEDICARE

## 2023-10-09 ENCOUNTER — TELEPHONE (OUTPATIENT)
Dept: PRIMARY CARE | Facility: CLINIC | Age: 83
End: 2023-10-09
Payer: MEDICARE

## 2023-10-09 NOTE — LETTER
October 10, 2023     Patient: Jodi Nuñez   YOB: 1940           To Whom It May Concern:    It is my medical opinion that Ms. Nuñez requires a disability parking placard for the following reasons:  She cannot walk without assistance from another person or the use of an assistance device (cane, crutch, prosthetic device, wheelchair, etc.).  She cannot walk 200 feet without stopping to rest.  Duration of need: 5 years  Expiration: 10/10/2028    If you have any questions or concerns, please don't hesitate to call.         Sincerely,        Manoj Bustamante MD

## 2023-10-09 NOTE — TELEPHONE ENCOUNTER
Rashid stopped by window after their appt to let know pt needs a handicap placard prescription. To call betzaida when ready for  in box up front. Best # to reach is at [phone]. Please advise.

## 2023-10-17 DIAGNOSIS — F41.1 GENERALIZED ANXIETY DISORDER: ICD-10-CM

## 2023-10-17 RX ORDER — ALPRAZOLAM 0.5 MG/1
0.5 TABLET ORAL 3 TIMES DAILY PRN
Qty: 21 TABLET | Refills: 0 | Status: SHIPPED | OUTPATIENT
Start: 2023-10-17 | End: 2023-11-03 | Stop reason: SDUPTHER

## 2023-10-17 NOTE — TELEPHONE ENCOUNTER
Rx Refill Request Telephone Encounter    Name:  Jodi Nuñez  :  [id number]  Medication Name: XANAX 0.5MG  Specific Pharmacy location:  [facility]  Date of last appointment: 23  Date of next appointment:  23  Best number to reach patient:  [phone]
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
## 2023-11-03 ENCOUNTER — TELEPHONE (OUTPATIENT)
Dept: PRIMARY CARE | Facility: CLINIC | Age: 83
End: 2023-11-03
Payer: MEDICARE

## 2023-11-03 DIAGNOSIS — F41.1 GENERALIZED ANXIETY DISORDER: Primary | ICD-10-CM

## 2023-11-03 RX ORDER — ALPRAZOLAM 0.5 MG/1
0.5 TABLET ORAL 3 TIMES DAILY PRN
Qty: 30 TABLET | Refills: 0 | Status: SHIPPED | OUTPATIENT
Start: 2023-11-03 | End: 2023-11-07 | Stop reason: SDUPTHER

## 2023-11-03 NOTE — TELEPHONE ENCOUNTER
Rx Refill Request Telephone Encounter    Name:  Jodi Nuñez  :  [id number]  Medication Name:  xanax 0.5 mg ; last fill 10/17 21 tablets by Dr. Zimmer  Specific Pharmacy location:  [facility]  Date of last appointment:    Date of next appointment:    Best number to reach patient:  [phone]    Patient is completely out of medication due to partial fill by Dr. Zimmer when Dr. Bustamante was out of town. Please advise.

## 2023-11-07 ENCOUNTER — TELEPHONE (OUTPATIENT)
Dept: PRIMARY CARE | Facility: CLINIC | Age: 83
End: 2023-11-07
Payer: MEDICARE

## 2023-11-07 DIAGNOSIS — F41.1 GENERALIZED ANXIETY DISORDER: Primary | ICD-10-CM

## 2023-11-07 RX ORDER — ALPRAZOLAM 0.5 MG/1
0.5 TABLET ORAL 3 TIMES DAILY PRN
Qty: 90 TABLET | Refills: 0 | Status: SHIPPED | OUTPATIENT
Start: 2023-11-10 | End: 2023-12-18 | Stop reason: SDUPTHER

## 2023-11-07 NOTE — TELEPHONE ENCOUNTER
RX PENDED w 90 quantity for full 30 day supply.   Supply was previously changed as NK was out of town and BB was covering.  Fill date updated to 11.10.2023   Pended to [facility] as directed

## 2023-11-07 NOTE — TELEPHONE ENCOUNTER
Chief Complaint   Patient presents with   • Atrial Fibrillation     F/V dX: Paroxysmal atrial fibrillation (HCC)      This evaluation was conducted via Zoom using secure and encrypted videoconferencing technology. The patient was in their home in the [facility].    The patient's identity was confirmed and verbal consent was obtained for this virtual visit.    Subjective     Omari Zarate is a 68 y.o. male who presents today PAF, hypertension, and hyperlipidemia follow-up.  Patient was last seen by Dr. Mcdonnell on 1/20/2021.  In addition, patient has following medical problems of JOSEFINA, BPH.    Today, patient feels well, denies chest pain, shortness of breath, palpitations, dizziness/lightheadedness, orthopnea, PND or Edema.  Patient does note increased ED and curious if current medication regimen may be increasing ED.  Patient reports blood pressure elevated at home since stopping Eliquis.  Patient curious if he could stop Eliquis.    Discussion regarding patient's [id number] 3 and moderate high risk for stroke.  We discussed investigating patient's A. fib burden with 2-week cardiac event monitor.  Patient agreeable.  We will refill patient's medications as well as increase patient's spironolactone for improved blood pressure control.  Patiently currently takes Cialis for ED, patient may continue with regimen at this time.  We will also update patient's blood work and increase atorvastatin.  Patient to follow-up after cardiac event monitor in [facility].  Patient to follow-up with Dr. Mcdonnell in 6 months.    Past Medical History:   Diagnosis Date   • A-fib (HCC)    • Atrial fibrillation (HCC)    • BPH (benign prostatic hyperplasia)    • Chronic bronchitis (HCC)    • Colorectal polyps    • Hyperlipidemia    • Hypertension    • Sleep apnea      Past Surgical History:   Procedure Laterality Date   • EYE SURGERY      Bilateral Cataract removal   • LAMINOTOMY     • NASAL SEPTAL RECONSTRUCTION     • OTHER       Patients daughter called stating patient is short on her xanax prescription. She stated patient normally gets 90 tabs for 30 day supply  Please advise   Xanax 0.5mg  [facility]   Uvulaectomy, tongue reduction   • SINUSOTOMIES     • TONSILLECTOMY AND ADENOIDECTOMY       Family History   Problem Relation Age of Onset   • Hypertension Mother    • Diabetes Mother    • Heart Disease Father    • Heart Disease Sister         Rheumatic    • Other Daughter         congential anomoly; leg and hip     Social History     Socioeconomic History   • Marital status:      Spouse name: Not on file   • Number of children: Not on file   • Years of education: Not on file   • Highest education level: Professional school degree (e.g., MD, DDS, DVM, ASHLEY)   Occupational History   • Occupation: Psychologist, 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     Comment: Psychologist; 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   Tobacco Use   • Smoking status: Former Smoker     Types: Cigarettes   • Smokeless tobacco: Never Used   Vaping Use   • Vaping Use: Never used   Substance and Sexual Activity   • Alcohol use: Not Currently   • Drug use: No   • Sexual activity: Yes     Partners: Female     Comment: ; 5 years    Other Topics Concern   • Not on file   Social History Narrative   • Not on file     Social Determinants of Health     Financial Resource Strain: Not on file   Food Insecurity: Not on file   Transportation Needs: Not on file   Physical Activity: Not on file   Stress: Not on file   Social Connections: Not on file   Intimate Partner Violence: Not on file   Housing Stability: Not on file     Allergies   Allergen Reactions   • Chocolate    • Penicillin G Unspecified   • Penicillins Rash     Between finger rash     Outpatient Encounter Medications as of 5/2/2022   Medication Sig Dispense Refill   • amLODIPine (NORVASC) 10 MG Tab Take 1 Tablet by mouth every day. 90 Tablet 3   • atorvastatin (LIPITOR) 40 MG Tab Take 1 Tablet by mouth every evening. 90 Tablet 3   • apixaban (ELIQUIS) 5mg Tab Take 1 Tablet by mouth 2 times a day. 180 Tablet 3   • losartan (COZAAR) 100 MG Tab Take 1 Tablet by mouth every day. 90 Tablet 0   •  spironolactone (ALDACTONE) 50 MG Tab Take 1 Tablet by mouth every day. 90 Tablet 3   • tadalafil (CIALIS) 10 MG tablet Take 10 mg by mouth as needed for Erectile Dysfunction.     • finasteride (PROSCAR) 5 MG Tab Take 1 Tablet by mouth every day. 90 Tablet 0   • augmented betamethasone dipropionate (DIPROLENE-AF) 0.05 % ointment Apply 1 Application topically 2 times a day. 15 g 0   • fluoxetine (PROZAC) 40 MG capsule Take 1 Capsule by mouth every day. 90 Capsule 0   • FLUAD QUADRIVALENT 0.5 ML Prefilled Syringe PHARMACY ADMINISTERED     • neomycin-polymixin-dexamethasone (MAXITROL) 3.5-01325-2.1 Ointment ophthalmic ointment      • [DISCONTINUED] amLODIPine (NORVASC) 10 MG Tab Take 1 Tablet by mouth every day. 90 Tablet 0   • [DISCONTINUED] atorvastatin (LIPITOR) 20 MG Tab Take 1 Tablet by mouth every day. 90 Tablet 0   • [DISCONTINUED] ELIQUIS 5 MG Tab TAKE ONE TABLET BY MOUTH TWICE DAILY 180 Tablet 0   • [DISCONTINUED] losartan (COZAAR) 100 MG Tab Take 1 Tablet by mouth every day. PT NEEDS TO BE SEEN BEFORE ANYMORE REFILLS CAN BE MADE, PLEASE CALL [phone] 90 Tablet 0   • [DISCONTINUED] spironolactone (ALDACTONE) 25 MG Tab TAKE ONE TABLET BY MOUTH ONE TIME DAILY 90 Tablet 1   • [DISCONTINUED] loratadine (CLARITIN) 10 MG Tab Take 10 mg by mouth every day. (Patient not taking: Reported on 2/21/2022)       No facility-administered encounter medications on file as of 5/2/2022.     Review of Systems   Constitutional: Negative for fever, malaise/fatigue and weight loss.   Eyes: Negative for blurred vision.   Respiratory: Negative for cough and shortness of breath.    Cardiovascular: Negative for chest pain, palpitations, orthopnea, claudication, leg swelling and PND.   Gastrointestinal: Negative for abdominal pain, blood in stool, nausea and vomiting.   Genitourinary: Negative for dysuria, frequency and hematuria.        +ED   Musculoskeletal: Negative for falls and myalgias.   Neurological: Negative for dizziness,  tingling and loss of consciousness.   Endo/Heme/Allergies: Does not bruise/bleed easily.              Objective     /75 (BP Location: Left arm, Patient Position: Sitting, BP Cuff Size: Adult)   Pulse (!) 52   Ht 1.829 m (6')   Wt 102 kg (224 lb)   BMI 30.38 kg/m²     Physical Exam  Physical Exam:  Constitutional: Alert, no distress, well-groomed.  Skin: No rashes in visible areas.  Eye: Round. Conjunctiva clear, lids normal. No icterus.   ENMT: Lips pink without lesions, good dentition, moist mucous membranes. Phonation normal.  Neck: No masses, no thyromegaly. Moves freely without pain.  CV: Pulse as reported by patient  Respiratory: Unlabored respiratory effort, no cough or audible wheeze  Psych: Alert and oriented x3, normal affect and mood.            Assessment & Plan     1. Essential hypertension  Holter Monitor Study    Lipid Profile    Comp Metabolic Panel    amLODIPine (NORVASC) 10 MG Tab    losartan (COZAAR) 100 MG Tab    spironolactone (ALDACTONE) 50 MG Tab   2. Mixed hyperlipidemia  Holter Monitor Study    Lipid Profile    Comp Metabolic Panel    atorvastatin (LIPITOR) 40 MG Tab   3. Paroxysmal atrial fibrillation (HCC)  Holter Monitor Study    Lipid Profile    Comp Metabolic Panel   4. HTN (hypertension), malignant  Holter Monitor Study    Lipid Profile    Comp Metabolic Panel   5. JOSEFINA (obstructive sleep apnea)  Holter Monitor Study    Lipid Profile    Comp Metabolic Panel   6. High risk medication use  Holter Monitor Study    Lipid Profile    Comp Metabolic Panel   7. Erectile dysfunction, unspecified erectile dysfunction type         Medical Decision Making: Today's Assessment/Status/Plan:        Paroxysmal Atrial Fibrillation (PAF)  - Asymptomatic, denies AF breakthrough, rate controlled.   -Virtual appointment, no EKG during OV today.  2-week cardiac event monitor ordered.  - On OAC with Eliquis, continue.    Hypertension  -Today in office blood pressure is elevated  -Home blood pressure  recordings are reported as 140 systolically.  Encouraged to continue home BP monitoring/log.  -Increase spironolactone to 50 mg daily.  Continue amlodipine and losartan at previous doses.  CMP for high risk medications    Hyperlipidemia  - (2020)  -No ASA due to OAC  -Increase atorvastatin to 40 mg daily.  Lipid panel ordered    FU in clinic in 2 months to discuss cardiac event monitor results in [place].  6 months with Dr. Mcdonnell.  Sooner if needed.    Patient verbalizes understanding and agrees with the plan of care.     I personally spent a total of 34 minutes which includes face-to-face time and non-face-to-face time spent on preparing to see the patient, reviewing prior notes and tests, obtaining history from the patient, performing a medically appropriate exam, counseling and educating the patient, ordering medications/tests/procedures/referrals as clinically indicated, and documenting information in the electronic medical record.    DIPAK Stanford.   [facility]  [phone]    PLEASE NOTE: This Note was created using voice recognition Software. I have made every reasonable attempt to correct obvious errors, but I expect that there are errors of grammar and possibly content that I did not discover before finalizing the note

## 2023-12-15 ENCOUNTER — APPOINTMENT (OUTPATIENT)
Dept: PLASTIC SURGERY | Facility: CLINIC | Age: 83
End: 2023-12-15
Payer: MEDICARE

## 2023-12-18 DIAGNOSIS — F41.1 GENERALIZED ANXIETY DISORDER: ICD-10-CM

## 2023-12-18 RX ORDER — SERTRALINE HYDROCHLORIDE 100 MG/1
100 TABLET, FILM COATED ORAL 2 TIMES DAILY
Qty: 60 TABLET | Refills: 0 | Status: SHIPPED | OUTPATIENT
Start: 2023-12-18

## 2023-12-18 RX ORDER — ALPRAZOLAM 0.5 MG/1
0.5 TABLET ORAL 3 TIMES DAILY PRN
Qty: 90 TABLET | Refills: 0 | Status: SHIPPED | OUTPATIENT
Start: 2023-12-18 | End: 2024-01-22 | Stop reason: SDUPTHER

## 2023-12-18 NOTE — TELEPHONE ENCOUNTER
Rx Refill Request Telephone Encounter    Name:  Jodi Nuñez  :  [id number]  Medication Name:    Alprazolam (Xanax) 0.5 mg   sertraline (Zoloft) 100 mg**  Specific Pharmacy location:  [facility]  Date of last appointment:    Date of next appointment:    Best number to reach patient:  [phone]           **needing a 90 day supply

## 2023-12-21 ENCOUNTER — TELEMEDICINE (OUTPATIENT)
Dept: PRIMARY CARE | Facility: CLINIC | Age: 83
End: 2023-12-21
Payer: MEDICARE

## 2023-12-21 DIAGNOSIS — I48.0 PAROXYSMAL ATRIAL FIBRILLATION (MULTI): ICD-10-CM

## 2023-12-21 DIAGNOSIS — E55.9 VITAMIN D DEFICIENCY: ICD-10-CM

## 2023-12-21 DIAGNOSIS — F41.1 GENERALIZED ANXIETY DISORDER: ICD-10-CM

## 2023-12-21 DIAGNOSIS — I25.10 CORONARY ARTERY DISEASE INVOLVING NATIVE CORONARY ARTERY OF NATIVE HEART WITHOUT ANGINA PECTORIS: Primary | ICD-10-CM

## 2023-12-21 DIAGNOSIS — F17.200 CURRENT EVERY DAY SMOKER: ICD-10-CM

## 2023-12-21 DIAGNOSIS — I10 ESSENTIAL HYPERTENSION: ICD-10-CM

## 2023-12-21 DIAGNOSIS — F02.B0 MODERATE LATE ONSET ALZHEIMER'S DEMENTIA WITHOUT BEHAVIORAL DISTURBANCE, PSYCHOTIC DISTURBANCE, MOOD DISTURBANCE, OR ANXIETY (MULTI): ICD-10-CM

## 2023-12-21 DIAGNOSIS — G30.1 MODERATE LATE ONSET ALZHEIMER'S DEMENTIA WITHOUT BEHAVIORAL DISTURBANCE, PSYCHOTIC DISTURBANCE, MOOD DISTURBANCE, OR ANXIETY (MULTI): ICD-10-CM

## 2023-12-21 DIAGNOSIS — C65.1 MALIGNANT NEOPLASM OF RIGHT RENAL PELVIS (MULTI): ICD-10-CM

## 2023-12-21 DIAGNOSIS — E78.2 MIXED HYPERLIPIDEMIA: ICD-10-CM

## 2023-12-21 PROCEDURE — 99443 PR PHYS/QHP TELEPHONE EVALUATION 21-30 MIN: CPT | Performed by: FAMILY MEDICINE

## 2023-12-21 ASSESSMENT — ENCOUNTER SYMPTOMS
MYALGIAS: 0
BACK PAIN: 0
NUMBNESS: 0
VOMITING: 0
DIARRHEA: 0
NERVOUS/ANXIOUS: 1
BLOOD IN STOOL: 0
ABDOMINAL PAIN: 0
NAUSEA: 0
CONSTIPATION: 0
DIZZINESS: 0
DYSPHORIC MOOD: 0
ACTIVITY CHANGE: 0
SORE THROAT: 0
CHEST TIGHTNESS: 0
SHORTNESS OF BREATH: 0
EYE DISCHARGE: 0
FATIGUE: 0
DIFFICULTY URINATING: 0
NECK PAIN: 0
ADENOPATHY: 0
HEMATURIA: 0
COUGH: 0
BRUISES/BLEEDS EASILY: 0
ARTHRALGIAS: 0
HEADACHES: 0
WEAKNESS: 0

## 2023-12-21 NOTE — PROGRESS NOTES
Subjective   Patient ID: Jodi Nuñez is a 83 y.o. female who presents for Coronary Artery Disease, Alzheimer's Disease, Anxiety, and Follow-up.  HPI    D/w pt and daughter ebtzaida    Telephone visit    Alzheimer stable issues at home are stable    Coronary disease under control A-fib controlled keep cardiac follow-up    Hypertension stable no chest pain or shortness of breath    High cholesterol stable watch diet    Low vitamin D  Replace    Renal cancer stable keep follow-up with specialist    Anxiety stable medicines helpful try to wean down on benzodiazepine    Patient smokes and should quit  Discussed risks    Review of Systems   Constitutional:  Negative for activity change and fatigue.   HENT:  Negative for congestion and sore throat.    Eyes:  Negative for discharge.   Respiratory:  Negative for cough, chest tightness and shortness of breath.    Cardiovascular:  Negative for chest pain and leg swelling.   Gastrointestinal:  Negative for abdominal pain, blood in stool, constipation, diarrhea, nausea and vomiting.   Endocrine: Negative for cold intolerance and heat intolerance.   Genitourinary:  Negative for difficulty urinating and hematuria.   Musculoskeletal:  Negative for arthralgias, back pain, gait problem, myalgias and neck pain.   Allergic/Immunologic: Negative for environmental allergies.   Neurological:  Negative for dizziness, syncope, weakness, numbness and headaches.   Hematological:  Negative for adenopathy. Does not bruise/bleed easily.   Psychiatric/Behavioral:  Negative for dysphoric mood. The patient is nervous/anxious.    All other systems reviewed and are negative.      Objective   There were no vitals taken for this visit.   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  Stay current with age appropriate health maintenance as instructed.  Appointment here or ER with new or worsening symptoms'  Keep appropriate follow-up visit.  Stay current with proper immunizations   3 months  D/w daughter

## 2024-01-02 DIAGNOSIS — F41.1 GENERALIZED ANXIETY DISORDER: ICD-10-CM

## 2024-01-02 RX ORDER — BUSPIRONE HYDROCHLORIDE 10 MG/1
10 TABLET ORAL 3 TIMES DAILY
Qty: 90 TABLET | Refills: 3 | Status: SHIPPED | OUTPATIENT
Start: 2024-01-02 | End: 2024-05-09 | Stop reason: SDUPTHER

## 2024-01-02 NOTE — TELEPHONE ENCOUNTER
Rx Refill Request Telephone Encounter    Name:  Jodi Nuñez  :  [id number]  Medication Name:  BUSPAR 10 MG ; LAST FILL   Specific Pharmacy location:  [facility]  Date of last appointment:    Date of next appointment:    Best number to reach patient:  [phone]    PLEASE ADVISE.

## 2024-01-22 DIAGNOSIS — F41.1 GENERALIZED ANXIETY DISORDER: ICD-10-CM

## 2024-01-22 RX ORDER — ALPRAZOLAM 0.5 MG/1
0.5 TABLET ORAL 3 TIMES DAILY PRN
Qty: 90 TABLET | Refills: 0 | Status: SHIPPED | OUTPATIENT
Start: 2024-01-22 | End: 2024-02-19 | Stop reason: SDUPTHER

## 2024-01-22 NOTE — TELEPHONE ENCOUNTER
Rx Refill Request Telephone Encounter    Name:  Jodi Nuñez  :  [id number]  Medication Name:  ALPRAZolam (Xanax) 0.5 mg tablet     Specific Pharmacy location:  [facility]  Date of last appointment:  23  Date of next appointment:  3/19/24  Best number to reach patient:  [phone]

## 2024-01-22 NOTE — TELEPHONE ENCOUNTER
Controlled Medication Only    Date of the Last and UDS Agreement: 5/2/2023  Date of the Last Fill at pharmacy: 12/18/2023

## 2024-02-19 DIAGNOSIS — F41.1 GENERALIZED ANXIETY DISORDER: ICD-10-CM

## 2024-02-19 RX ORDER — ALPRAZOLAM 0.5 MG/1
0.5 TABLET ORAL 3 TIMES DAILY PRN
Qty: 90 TABLET | Refills: 0 | Status: SHIPPED | OUTPATIENT
Start: 2024-02-19 | End: 2024-03-12 | Stop reason: SDUPTHER

## 2024-02-19 NOTE — TELEPHONE ENCOUNTER
Rx Refill Request Telephone Encounter    Name:  Jodi Nuñez  :  [id number]  Medication Name:  ALPRAZolam (Xanax) 0.5 mg tablet     Specific Pharmacy location:  [facility]  Date of last appointment:  23  Date of next appointment:  3/19/24  Best number to reach patient:  [phone]

## 2024-02-22 ENCOUNTER — APPOINTMENT (OUTPATIENT)
Dept: UROLOGY | Facility: CLINIC | Age: 84
End: 2024-02-22
Payer: MEDICARE

## 2024-02-25 ENCOUNTER — APPOINTMENT (OUTPATIENT)
Dept: RADIOLOGY | Facility: HOSPITAL | Age: 84
End: 2024-02-25
Payer: MEDICARE

## 2024-02-25 ENCOUNTER — APPOINTMENT (OUTPATIENT)
Dept: CARDIOLOGY | Facility: HOSPITAL | Age: 84
End: 2024-02-25
Payer: MEDICARE

## 2024-02-25 ENCOUNTER — HOSPITAL ENCOUNTER (OUTPATIENT)
Facility: HOSPITAL | Age: 84
Setting detail: OBSERVATION
Discharge: NURSING, REHAB OR LONG-TERM CARE | End: 2024-02-27
Attending: STUDENT IN AN ORGANIZED HEALTH CARE EDUCATION/TRAINING PROGRAM | Admitting: INTERNAL MEDICINE
Payer: MEDICARE

## 2024-02-25 DIAGNOSIS — F17.200 CURRENT EVERY DAY SMOKER: ICD-10-CM

## 2024-02-25 DIAGNOSIS — S82.091A OTHER CLOSED FRACTURE OF RIGHT PATELLA, INITIAL ENCOUNTER: Primary | ICD-10-CM

## 2024-02-25 DIAGNOSIS — R79.89 OTHER SPECIFIED ABNORMAL FINDINGS OF BLOOD CHEMISTRY: ICD-10-CM

## 2024-02-25 DIAGNOSIS — I25.10 CORONARY ARTERY DISEASE INVOLVING NATIVE CORONARY ARTERY OF NATIVE HEART WITHOUT ANGINA PECTORIS: ICD-10-CM

## 2024-02-25 PROBLEM — S82.009A: Status: ACTIVE | Noted: 2024-02-25

## 2024-02-25 LAB
ALBUMIN SERPL BCP-MCNC: 4.7 G/DL (ref 3.4–5)
ALP SERPL-CCNC: 74 U/L (ref 33–136)
ALT SERPL W P-5'-P-CCNC: 11 U/L (ref 7–45)
ANION GAP SERPL CALC-SCNC: 14 MMOL/L (ref 10–20)
AST SERPL W P-5'-P-CCNC: 13 U/L (ref 9–39)
ATRIAL RATE: 54 BPM
BASOPHILS # BLD AUTO: 0.12 X10*3/UL (ref 0–0.1)
BASOPHILS NFR BLD AUTO: 1.1 %
BILIRUB SERPL-MCNC: 0.5 MG/DL (ref 0–1.2)
BUN SERPL-MCNC: 21 MG/DL (ref 6–23)
CALCIUM SERPL-MCNC: 9.6 MG/DL (ref 8.6–10.3)
CARDIAC TROPONIN I PNL SERPL HS: 56 NG/L (ref 0–13)
CARDIAC TROPONIN I PNL SERPL HS: 64 NG/L (ref 0–13)
CHLORIDE SERPL-SCNC: 102 MMOL/L (ref 98–107)
CO2 SERPL-SCNC: 23 MMOL/L (ref 21–32)
CREAT SERPL-MCNC: 1.54 MG/DL (ref 0.5–1.05)
EGFRCR SERPLBLD CKD-EPI 2021: 34 ML/MIN/1.73M*2
EOSINOPHIL # BLD AUTO: 0.05 X10*3/UL (ref 0–0.4)
EOSINOPHIL NFR BLD AUTO: 0.5 %
ERYTHROCYTE [DISTWIDTH] IN BLOOD BY AUTOMATED COUNT: 16.1 % (ref 11.5–14.5)
GLUCOSE SERPL-MCNC: 111 MG/DL (ref 74–99)
HCT VFR BLD AUTO: 41.6 % (ref 36–46)
HGB BLD-MCNC: 13.9 G/DL (ref 12–16)
IMM GRANULOCYTES # BLD AUTO: 0.05 X10*3/UL (ref 0–0.5)
IMM GRANULOCYTES NFR BLD AUTO: 0.5 % (ref 0–0.9)
LYMPHOCYTES # BLD AUTO: 1.33 X10*3/UL (ref 0.8–3)
LYMPHOCYTES NFR BLD AUTO: 12.2 %
MAGNESIUM SERPL-MCNC: 2.1 MG/DL (ref 1.6–2.4)
MCH RBC QN AUTO: 31.4 PG (ref 26–34)
MCHC RBC AUTO-ENTMCNC: 33.4 G/DL (ref 32–36)
MCV RBC AUTO: 94 FL (ref 80–100)
MONOCYTES # BLD AUTO: 0.72 X10*3/UL (ref 0.05–0.8)
MONOCYTES NFR BLD AUTO: 6.6 %
NEUTROPHILS # BLD AUTO: 8.62 X10*3/UL (ref 1.6–5.5)
NEUTROPHILS NFR BLD AUTO: 79.1 %
NRBC BLD-RTO: 0 /100 WBCS (ref 0–0)
P AXIS: 60 DEGREES
P OFFSET: 218 MS
P ONSET: 154 MS
PLATELET # BLD AUTO: 262 X10*3/UL (ref 150–450)
POTASSIUM SERPL-SCNC: 4.5 MMOL/L (ref 3.5–5.3)
PR INTERVAL: 148 MS
PROT SERPL-MCNC: 7.4 G/DL (ref 6.4–8.2)
Q ONSET: 228 MS
QRS COUNT: 9 BEATS
QRS DURATION: 82 MS
QT INTERVAL: 440 MS
QTC CALCULATION(BAZETT): 417 MS
QTC FREDERICIA: 424 MS
R AXIS: 30 DEGREES
RBC # BLD AUTO: 4.43 X10*6/UL (ref 4–5.2)
SODIUM SERPL-SCNC: 134 MMOL/L (ref 136–145)
T AXIS: 130 DEGREES
T OFFSET: 448 MS
VENTRICULAR RATE: 54 BPM
WBC # BLD AUTO: 10.9 X10*3/UL (ref 4.4–11.3)

## 2024-02-25 PROCEDURE — 2500000002 HC RX 250 W HCPCS SELF ADMINISTERED DRUGS (ALT 637 FOR MEDICARE OP, ALT 636 FOR OP/ED): Performed by: INTERNAL MEDICINE

## 2024-02-25 PROCEDURE — 93005 ELECTROCARDIOGRAM TRACING: CPT

## 2024-02-25 PROCEDURE — 99285 EMERGENCY DEPT VISIT HI MDM: CPT | Mod: 25

## 2024-02-25 PROCEDURE — 84484 ASSAY OF TROPONIN QUANT: CPT | Performed by: NURSE PRACTITIONER

## 2024-02-25 PROCEDURE — 2500000005 HC RX 250 GENERAL PHARMACY W/O HCPCS: Performed by: NURSE PRACTITIONER

## 2024-02-25 PROCEDURE — 73564 X-RAY EXAM KNEE 4 OR MORE: CPT | Mod: RT

## 2024-02-25 PROCEDURE — 36415 COLL VENOUS BLD VENIPUNCTURE: CPT | Performed by: INTERNAL MEDICINE

## 2024-02-25 PROCEDURE — 36415 COLL VENOUS BLD VENIPUNCTURE: CPT | Performed by: NURSE PRACTITIONER

## 2024-02-25 PROCEDURE — 73564 X-RAY EXAM KNEE 4 OR MORE: CPT | Mod: RIGHT SIDE | Performed by: RADIOLOGY

## 2024-02-25 PROCEDURE — G0378 HOSPITAL OBSERVATION PER HR: HCPCS

## 2024-02-25 PROCEDURE — 2500000001 HC RX 250 WO HCPCS SELF ADMINISTERED DRUGS (ALT 637 FOR MEDICARE OP): Performed by: NURSE PRACTITIONER

## 2024-02-25 PROCEDURE — 2500000004 HC RX 250 GENERAL PHARMACY W/ HCPCS (ALT 636 FOR OP/ED): Performed by: INTERNAL MEDICINE

## 2024-02-25 PROCEDURE — 80053 COMPREHEN METABOLIC PANEL: CPT | Performed by: NURSE PRACTITIONER

## 2024-02-25 PROCEDURE — 83735 ASSAY OF MAGNESIUM: CPT | Performed by: NURSE PRACTITIONER

## 2024-02-25 PROCEDURE — 2500000001 HC RX 250 WO HCPCS SELF ADMINISTERED DRUGS (ALT 637 FOR MEDICARE OP): Performed by: INTERNAL MEDICINE

## 2024-02-25 PROCEDURE — 71045 X-RAY EXAM CHEST 1 VIEW: CPT

## 2024-02-25 PROCEDURE — 85025 COMPLETE CBC W/AUTO DIFF WBC: CPT | Performed by: NURSE PRACTITIONER

## 2024-02-25 PROCEDURE — 84484 ASSAY OF TROPONIN QUANT: CPT | Performed by: INTERNAL MEDICINE

## 2024-02-25 PROCEDURE — 99222 1ST HOSP IP/OBS MODERATE 55: CPT | Performed by: INTERNAL MEDICINE

## 2024-02-25 PROCEDURE — 71045 X-RAY EXAM CHEST 1 VIEW: CPT | Performed by: RADIOLOGY

## 2024-02-25 RX ORDER — HEPARIN SODIUM 5000 [USP'U]/ML
5000 INJECTION, SOLUTION INTRAVENOUS; SUBCUTANEOUS EVERY 8 HOURS SCHEDULED
Status: DISCONTINUED | OUTPATIENT
Start: 2024-02-25 | End: 2024-02-27 | Stop reason: HOSPADM

## 2024-02-25 RX ORDER — ATORVASTATIN CALCIUM 20 MG/1
40 TABLET, FILM COATED ORAL DAILY
Status: DISCONTINUED | OUTPATIENT
Start: 2024-02-25 | End: 2024-02-27 | Stop reason: HOSPADM

## 2024-02-25 RX ORDER — CARVEDILOL 12.5 MG/1
12.5 TABLET ORAL
COMMUNITY

## 2024-02-25 RX ORDER — ASPIRIN 81 MG/1
81 TABLET ORAL DAILY
Status: DISCONTINUED | OUTPATIENT
Start: 2024-02-25 | End: 2024-02-27 | Stop reason: HOSPADM

## 2024-02-25 RX ORDER — PANTOPRAZOLE SODIUM 40 MG/1
40 TABLET, DELAYED RELEASE ORAL DAILY
Status: DISCONTINUED | OUTPATIENT
Start: 2024-02-25 | End: 2024-02-27 | Stop reason: HOSPADM

## 2024-02-25 RX ORDER — ALBUTEROL SULFATE 0.83 MG/ML
2.5 SOLUTION RESPIRATORY (INHALATION) EVERY 6 HOURS PRN
Status: DISCONTINUED | OUTPATIENT
Start: 2024-02-25 | End: 2024-02-27 | Stop reason: HOSPADM

## 2024-02-25 RX ORDER — ONDANSETRON 4 MG/1
4 TABLET, ORALLY DISINTEGRATING ORAL ONCE
Status: COMPLETED | OUTPATIENT
Start: 2024-02-25 | End: 2024-02-25

## 2024-02-25 RX ORDER — DONEPEZIL HYDROCHLORIDE 5 MG/1
10 TABLET, FILM COATED ORAL NIGHTLY
Status: DISCONTINUED | OUTPATIENT
Start: 2024-02-25 | End: 2024-02-27 | Stop reason: HOSPADM

## 2024-02-25 RX ORDER — BUSPIRONE HYDROCHLORIDE 5 MG/1
10 TABLET ORAL 3 TIMES DAILY
Status: DISCONTINUED | OUTPATIENT
Start: 2024-02-25 | End: 2024-02-27 | Stop reason: HOSPADM

## 2024-02-25 RX ORDER — MEMANTINE HYDROCHLORIDE 5 MG/1
10 TABLET ORAL 2 TIMES DAILY
Status: DISCONTINUED | OUTPATIENT
Start: 2024-02-25 | End: 2024-02-27 | Stop reason: HOSPADM

## 2024-02-25 RX ORDER — SERTRALINE HYDROCHLORIDE 50 MG/1
100 TABLET, FILM COATED ORAL 2 TIMES DAILY
Status: DISCONTINUED | OUTPATIENT
Start: 2024-02-25 | End: 2024-02-27 | Stop reason: HOSPADM

## 2024-02-25 RX ORDER — ALPRAZOLAM 0.5 MG/1
0.5 TABLET ORAL 3 TIMES DAILY PRN
Status: DISCONTINUED | OUTPATIENT
Start: 2024-02-25 | End: 2024-02-27 | Stop reason: HOSPADM

## 2024-02-25 RX ORDER — HYDROCODONE BITARTRATE AND ACETAMINOPHEN 5; 325 MG/1; MG/1
1 TABLET ORAL ONCE
Status: COMPLETED | OUTPATIENT
Start: 2024-02-25 | End: 2024-02-25

## 2024-02-25 RX ORDER — LIDOCAINE 560 MG/1
1 PATCH PERCUTANEOUS; TOPICAL; TRANSDERMAL DAILY
Status: DISCONTINUED | OUTPATIENT
Start: 2024-02-25 | End: 2024-02-25

## 2024-02-25 RX ORDER — ACETAMINOPHEN 325 MG/1
650 TABLET ORAL EVERY 6 HOURS PRN
Status: DISCONTINUED | OUTPATIENT
Start: 2024-02-25 | End: 2024-02-27 | Stop reason: HOSPADM

## 2024-02-25 RX ORDER — SENNOSIDES 8.6 MG/1
2 TABLET ORAL 2 TIMES DAILY
Status: DISCONTINUED | OUTPATIENT
Start: 2024-02-25 | End: 2024-02-27 | Stop reason: HOSPADM

## 2024-02-25 RX ORDER — CARVEDILOL 12.5 MG/1
12.5 TABLET ORAL
Status: DISCONTINUED | OUTPATIENT
Start: 2024-02-25 | End: 2024-02-27 | Stop reason: HOSPADM

## 2024-02-25 RX ORDER — OXYCODONE HYDROCHLORIDE 5 MG/1
5 TABLET ORAL EVERY 4 HOURS PRN
Status: DISCONTINUED | OUTPATIENT
Start: 2024-02-25 | End: 2024-02-27 | Stop reason: HOSPADM

## 2024-02-25 RX ADMIN — SERTRALINE HYDROCHLORIDE 100 MG: 50 TABLET, FILM COATED ORAL at 20:30

## 2024-02-25 RX ADMIN — ALPRAZOLAM 0.5 MG: 0.5 TABLET ORAL at 15:40

## 2024-02-25 RX ADMIN — HYDROCODONE BITARTRATE AND ACETAMINOPHEN 1 TABLET: 5; 325 TABLET ORAL at 10:54

## 2024-02-25 RX ADMIN — CARVEDILOL 12.5 MG: 12.5 TABLET, FILM COATED ORAL at 17:16

## 2024-02-25 RX ADMIN — MEMANTINE HYDROCHLORIDE 10 MG: 5 TABLET, FILM COATED ORAL at 20:30

## 2024-02-25 RX ADMIN — BUSPIRONE HYDROCHLORIDE 10 MG: 5 TABLET ORAL at 20:30

## 2024-02-25 RX ADMIN — ONDANSETRON 4 MG: 4 TABLET, ORALLY DISINTEGRATING ORAL at 10:54

## 2024-02-25 RX ADMIN — DONEPEZIL HYDROCHLORIDE 10 MG: 5 TABLET ORAL at 20:30

## 2024-02-25 RX ADMIN — OXYCODONE HYDROCHLORIDE 5 MG: 5 TABLET ORAL at 15:36

## 2024-02-25 RX ADMIN — OXYCODONE HYDROCHLORIDE 5 MG: 5 TABLET ORAL at 20:30

## 2024-02-25 RX ADMIN — ALPRAZOLAM 0.5 MG: 0.5 TABLET ORAL at 23:20

## 2024-02-25 RX ADMIN — HEPARIN SODIUM 5000 UNITS: 5000 INJECTION INTRAVENOUS; SUBCUTANEOUS at 21:14

## 2024-02-25 RX ADMIN — STANDARDIZED SENNA CONCENTRATE 17.2 MG: 8.6 TABLET ORAL at 20:30

## 2024-02-25 RX ADMIN — HEPARIN SODIUM 5000 UNITS: 5000 INJECTION INTRAVENOUS; SUBCUTANEOUS at 15:35

## 2024-02-25 RX ADMIN — BUSPIRONE HYDROCHLORIDE 10 MG: 5 TABLET ORAL at 17:16

## 2024-02-25 RX ADMIN — LIDOCAINE 1 PATCH: 4 PATCH TOPICAL at 10:54

## 2024-02-25 SDOH — SOCIAL STABILITY: SOCIAL INSECURITY: DOES ANYONE TRY TO KEEP YOU FROM HAVING/CONTACTING OTHER FRIENDS OR DOING THINGS OUTSIDE YOUR HOME?: NO

## 2024-02-25 SDOH — SOCIAL STABILITY: SOCIAL INSECURITY: DO YOU FEEL UNSAFE GOING BACK TO THE PLACE WHERE YOU ARE LIVING?: NO

## 2024-02-25 SDOH — SOCIAL STABILITY: SOCIAL INSECURITY: DO YOU FEEL ANYONE HAS EXPLOITED OR TAKEN ADVANTAGE OF YOU FINANCIALLY OR OF YOUR PERSONAL PROPERTY?: NO

## 2024-02-25 SDOH — SOCIAL STABILITY: SOCIAL INSECURITY: HAS ANYONE EVER THREATENED TO HURT YOUR FAMILY OR YOUR PETS?: NO

## 2024-02-25 SDOH — SOCIAL STABILITY: SOCIAL INSECURITY: ARE THERE ANY APPARENT SIGNS OF INJURIES/BEHAVIORS THAT COULD BE RELATED TO ABUSE/NEGLECT?: NO

## 2024-02-25 SDOH — SOCIAL STABILITY: SOCIAL INSECURITY: ARE YOU OR HAVE YOU BEEN THREATENED OR ABUSED PHYSICALLY, EMOTIONALLY, OR SEXUALLY BY ANYONE?: NO

## 2024-02-25 SDOH — SOCIAL STABILITY: SOCIAL INSECURITY: ABUSE: ADULT

## 2024-02-25 SDOH — SOCIAL STABILITY: SOCIAL INSECURITY: HAVE YOU HAD THOUGHTS OF HARMING ANYONE ELSE?: NO

## 2024-02-25 ASSESSMENT — PAIN SCALES - GENERAL
PAINLEVEL_OUTOF10: 10 - WORST POSSIBLE PAIN
PAINLEVEL_OUTOF10: 4
PAINLEVEL_OUTOF10: 3
PAINLEVEL_OUTOF10: 4
PAINLEVEL_OUTOF10: 6
PAINLEVEL_OUTOF10: 2

## 2024-02-25 ASSESSMENT — PAIN - FUNCTIONAL ASSESSMENT
PAIN_FUNCTIONAL_ASSESSMENT: 0-10

## 2024-02-25 ASSESSMENT — COGNITIVE AND FUNCTIONAL STATUS - GENERAL
DRESSING REGULAR LOWER BODY CLOTHING: A LITTLE
MOVING TO AND FROM BED TO CHAIR: A LITTLE
PATIENT BASELINE BEDBOUND: NO
WALKING IN HOSPITAL ROOM: A LITTLE
TURNING FROM BACK TO SIDE WHILE IN FLAT BAD: A LITTLE
CLIMB 3 TO 5 STEPS WITH RAILING: A LITTLE
STANDING UP FROM CHAIR USING ARMS: A LITTLE
TURNING FROM BACK TO SIDE WHILE IN FLAT BAD: A LITTLE
EATING MEALS: A LITTLE
WALKING IN HOSPITAL ROOM: A LITTLE
DAILY ACTIVITIY SCORE: 21
MOBILITY SCORE: 18
DAILY ACTIVITIY SCORE: 18
PERSONAL GROOMING: A LITTLE
DRESSING REGULAR LOWER BODY CLOTHING: A LITTLE
STANDING UP FROM CHAIR USING ARMS: A LITTLE
MOBILITY SCORE: 18
MOVING TO AND FROM BED TO CHAIR: A LITTLE
HELP NEEDED FOR BATHING: A LITTLE
TOILETING: A LITTLE
MOVING FROM LYING ON BACK TO SITTING ON SIDE OF FLAT BED WITH BEDRAILS: A LITTLE
TOILETING: A LITTLE
DRESSING REGULAR UPPER BODY CLOTHING: A LITTLE
CLIMB 3 TO 5 STEPS WITH RAILING: A LITTLE
MOVING FROM LYING ON BACK TO SITTING ON SIDE OF FLAT BED WITH BEDRAILS: A LITTLE
HELP NEEDED FOR BATHING: A LITTLE

## 2024-02-25 ASSESSMENT — ACTIVITIES OF DAILY LIVING (ADL)
WALKS IN HOME: NEEDS ASSISTANCE
LACK_OF_TRANSPORTATION: NO
JUDGMENT_ADEQUATE_SAFELY_COMPLETE_DAILY_ACTIVITIES: YES
TOILETING: NEEDS ASSISTANCE
FEEDING YOURSELF: INDEPENDENT
ADEQUATE_TO_COMPLETE_ADL: YES
HEARING - LEFT EAR: FUNCTIONAL
DRESSING YOURSELF: NEEDS ASSISTANCE
GROOMING: NEEDS ASSISTANCE
PATIENT'S MEMORY ADEQUATE TO SAFELY COMPLETE DAILY ACTIVITIES?: YES
HEARING - RIGHT EAR: FUNCTIONAL
BATHING: NEEDS ASSISTANCE

## 2024-02-25 ASSESSMENT — LIFESTYLE VARIABLES
PRESCIPTION_ABUSE_PAST_12_MONTHS: NO
SUBSTANCE_ABUSE_PAST_12_MONTHS: NO
HAVE PEOPLE ANNOYED YOU BY CRITICIZING YOUR DRINKING: NO
AUDIT-C TOTAL SCORE: 0
SKIP TO QUESTIONS 9-10: 1
HAVE YOU EVER FELT YOU SHOULD CUT DOWN ON YOUR DRINKING: NO
EVER FELT BAD OR GUILTY ABOUT YOUR DRINKING: NO
HOW OFTEN DO YOU HAVE A DRINK CONTAINING ALCOHOL: NEVER
HOW MANY STANDARD DRINKS CONTAINING ALCOHOL DO YOU HAVE ON A TYPICAL DAY: PATIENT DOES NOT DRINK
HOW OFTEN DO YOU HAVE 6 OR MORE DRINKS ON ONE OCCASION: NEVER
AUDIT-C TOTAL SCORE: 0

## 2024-02-25 ASSESSMENT — PAIN DESCRIPTION - ORIENTATION: ORIENTATION: RIGHT

## 2024-02-25 ASSESSMENT — COLUMBIA-SUICIDE SEVERITY RATING SCALE - C-SSRS
1. IN THE PAST MONTH, HAVE YOU WISHED YOU WERE DEAD OR WISHED YOU COULD GO TO SLEEP AND NOT WAKE UP?: NO
6. HAVE YOU EVER DONE ANYTHING, STARTED TO DO ANYTHING, OR PREPARED TO DO ANYTHING TO END YOUR LIFE?: NO
2. HAVE YOU ACTUALLY HAD ANY THOUGHTS OF KILLING YOURSELF?: NO

## 2024-02-25 ASSESSMENT — PATIENT HEALTH QUESTIONNAIRE - PHQ9
SUM OF ALL RESPONSES TO PHQ9 QUESTIONS 1 & 2: 0
1. LITTLE INTEREST OR PLEASURE IN DOING THINGS: NOT AT ALL
2. FEELING DOWN, DEPRESSED OR HOPELESS: NOT AT ALL

## 2024-02-25 ASSESSMENT — PAIN DESCRIPTION - LOCATION
LOCATION: KNEE
LOCATION: KNEE

## 2024-02-25 ASSESSMENT — PAIN DESCRIPTION - PAIN TYPE: TYPE: ACUTE PAIN

## 2024-02-25 NOTE — ED PROVIDER NOTES
HPI   Chief Complaint   Patient presents with    Knee Pain     Pt fell out of bed and hurt right knee.       83-year-old female presents to the emergency department, per the patient and her daughter patient had a couple falls recently, this morning, as she sleeps on the edge of the bed, rolled over, fell onto the ground, taking the brunt of the fall onto the right knee.  Describes pain and swelling to the right knee, difficulty with ambulation or any weightbearing.  No additional injuries, denies head injury or loss of consciousness, neck or back pain.  Not on any blood thinners.      History provided by:  Patient and relative   used: No                        No data recorded                   Patient History   Past Medical History:   Diagnosis Date    Abnormal bladder cytology 05/02/2023    Abnormal EKG 05/02/2023    Abnormal vaginal bleeding 05/02/2023    Impacted cerumen, bilateral 01/13/2021    Bilateral impacted cerumen    Other gastritis without bleeding 04/08/2021    Gastritis, bile acid reflux    Personal history of diseases of the skin and subcutaneous tissue 09/16/2019    History of contact dermatitis    Personal history of other diseases of the digestive system 01/13/2021    History of glossitis    Personal history of other diseases of urinary system 04/21/2022    History of hematuria    Personal history of other specified conditions 12/03/2020    History of epistaxis    Tongue lesion 05/02/2023    Unspecified symptoms and signs involving the genitourinary system     UTI symptoms     Past Surgical History:   Procedure Laterality Date    OTHER SURGICAL HISTORY  06/10/2019    Cataract surgery    OTHER SURGICAL HISTORY  03/16/2022    Colonoscopy    OTHER SURGICAL HISTORY  03/16/2022    Hysterectomy    OTHER SURGICAL HISTORY  09/07/2022    Nephroureterectomy     Family History   Problem Relation Name Age of Onset    Diabetes Mother      Colon cancer Father      Hypertension Sister       Other (CARDIAC DISORDER) Sister      Hypertension Brother      Alzheimer's disease Brother      Glaucoma Brother      Dementia Other       Social History     Tobacco Use    Smoking status: Every Day     Packs/day: .5     Types: Cigarettes     Passive exposure: Never    Smokeless tobacco: Never   Substance Use Topics    Alcohol use: Never    Drug use: Never       Physical Exam   ED Triage Vitals [02/25/24 1032]   Temperature Heart Rate Respirations BP   36 °C (96.8 °F) 56 16 173/75      Pulse Ox Temp src Heart Rate Source Patient Position   96 % -- -- --      BP Location FiO2 (%)     -- --       Physical Exam  Gen.: Vitals noted. No distress. Afebrile.   Cardiac: Regular rate rhythm. No murmur.   Pulmonary: Equal breath sounds bilaterally. No adventitious breath sounds.   Abdomen: Soft, nontender, nonsurgical. Normoactive bowel sounds.   Back: Nontender throughout.   Lower extremity: There is no tenderness over the medial joint line. There is no tenderness over the lateral joint line.  There is tenderness over the patella, there is significant ecchymosis, edema.  The extensor mechanism is intact. There is no obvious laxity. The remainder of the extremity, specifically, the tib-fib, ankle, and foot are nontender. Skin is intact. Is neurovascularly intact distally. There is no evidence of an intra-articular infection. Compartments are soft to palpation. There is no suggestion of DVT.    ED Course & MDM   Diagnoses as of 02/25/24 1218   Other closed fracture of right patella, initial encounter       Medical Decision Making  Discussed pain medication, given Norco as she has tolerated Vicodin in the past, placed Lidoderm patch on the knee as well.    X-ray was obtained, did show nondisplaced intra-articular fracture of the patella as well as suprapatellar effusion, prepatellar soft tissue swelling as well.    Discussed with patient and her daughter, daughter is very concerned as it has been very difficult to get the  patient in and out of the house, as well as around the house, states they do not have good bathroom access for her as well.  Daughter did request that the patient stay for pain control and rehab    Knee immobilizer was placed to the right knee by the bedside nurse, MSPs intact pre and post splint application.    Medical workup was obtained for hospitalization.    EKG at 1143 with ventricular to 54, as interpreted me, shows sinus bradycardia with ST and T wave abnormality, no evidence of acute ischemia or other acute findings.    CBC and metabolic panels unremarkable.    I did discuss with the hospitalist for admission for pain control, PT and possible rehab placement    Procedure  Procedures     Yaritza Dowling, SEJAL-CNP  02/25/24 9913

## 2024-02-25 NOTE — H&P
Medical Group History and Physical    ASSESSMENT/PLAN:     Fall  R patellar fracture  -fall was mechanical in nature, out of bed  -knee in immobilizer  -cont conservative management, pain control    Elevated troponin  Hx CAD  -presumed demand ischemia; she has had no chest pain or dyspnea; troponins are flat; EKG looks similar to priors with sinus kellen, lateral t wave inversions  -will get an echo  -cont asa/statin    CKDIII  -appears to be at baseline looking at outside records    HTN  -resume home antihypertensives  -she is on coreg at home, has sinus kellen here; will cautiously resume as this kellen is not new, dating back to 2022; will cautiously resume for now and watch on tele    COPD  -well controlled, prn bronchodilators    Anxiety/depression  Dementia  -resume home meds    VTE Prophylaxis: heparin      HISTORY OF PRESENT ILLNESS:   Chief Complaint: Fall    History Of Present Illness:    Jodi Nuñez is a 83 y.o. female with a significant past medical history of hypertension, COPD, CAD, anxiety/depression, dementia, who is presenting to the emergency department after a fall at home today.  Tells me that she excellently rolled off the edge of her bed and onto the ground landing on her right knee.  She did not strike her head.  She denies any loss of consciousness.  Denies any preceding chest pain, dyspnea, palpitations.  She does not have frequent falls.  She immediately had pain to the right knee so came to the emergency department where imaging showed a suspected patellar fracture.  She is admitted for further evaluation and treatment.  She denies any chest pain or shortness of breath.  She does not experience exertional chest pain.  No cough, fever.  No abdominal pain, nausea vomiting, diarrhea.       Review of systems: 10 point review of systems is otherwise negative except as mentioned above.    PAST HISTORIES:       Past Medical History:  She has a past medical history of Abnormal bladder cytology  "(05/02/2023), Abnormal EKG (05/02/2023), Abnormal vaginal bleeding (05/02/2023), Impacted cerumen, bilateral (01/13/2021), Other gastritis without bleeding (04/08/2021), Personal history of diseases of the skin and subcutaneous tissue (09/16/2019), Personal history of other diseases of the digestive system (01/13/2021), Personal history of other diseases of urinary system (04/21/2022), Personal history of other specified conditions (12/03/2020), Tongue lesion (05/02/2023), and Unspecified symptoms and signs involving the genitourinary system.    Past Surgical History:  She has a past surgical history that includes Other surgical history (06/10/2019); Other surgical history (03/16/2022); Other surgical history (03/16/2022); and Other surgical history (09/07/2022).      Social History:  She reports that she has been smoking cigarettes. She has been smoking an average of .5 packs per day. She has never been exposed to tobacco smoke. She has never used smokeless tobacco. She reports that she does not drink alcohol and does not use drugs.    Family History:  Family History   Problem Relation Name Age of Onset    Diabetes Mother      Colon cancer Father      Hypertension Sister      Other (CARDIAC DISORDER) Sister      Hypertension Brother      Alzheimer's disease Brother      Glaucoma Brother      Dementia Other          Allergies:  Diphenhydramine hcl, Fexofenadine-pseudoephedrine, and Pseudoephedrine      OBJECTIVE:       Last Recorded Vitals:  Vitals:    02/25/24 1156 02/25/24 1323 02/25/24 1346 02/25/24 1534   BP: 136/72 132/60 149/68 152/65   BP Location: Left arm Left arm Left arm Right arm   Patient Position: Lying Sitting Lying Lying   Pulse: 58 62 80 63   Resp: 16 18 16 16   Temp:   36 °C (96.8 °F) 36.5 °C (97.7 °F)   TempSrc:   Temporal Temporal   SpO2: 96% 96% 95% 92%   Weight:   55.8 kg (123 lb)    Height:   1.549 m (5' 0.98\")        Last I/O:  No intake/output data recorded.    Physical Exam  Vitals " reviewed.   Constitutional:       Appearance: Normal appearance.   HENT:      Head: Normocephalic and atraumatic.      Nose: Nose normal.      Mouth/Throat:      Mouth: Mucous membranes are moist.      Pharynx: Oropharynx is clear.   Eyes:      Extraocular Movements: Extraocular movements intact.      Conjunctiva/sclera: Conjunctivae normal.      Pupils: Pupils are equal, round, and reactive to light.   Cardiovascular:      Rate and Rhythm: Regular rhythm.      Heart sounds: No murmur heard.     No gallop.   Pulmonary:      Effort: Pulmonary effort is normal. No respiratory distress.      Breath sounds: Normal breath sounds. No wheezing, rhonchi or rales.   Abdominal:      General: Abdomen is flat. Bowel sounds are normal. There is no distension.      Palpations: Abdomen is soft.      Tenderness: There is no abdominal tenderness. There is no guarding or rebound.   Musculoskeletal:         General: Normal range of motion.      Comments: R knee in immobilizer; distal pulses intact; warm, well perfused; sensation and strength intact distally   Skin:     General: Skin is warm and dry.   Neurological:      General: No focal deficit present.      Mental Status: She is alert and oriented to person, place, and time.      Cranial Nerves: No cranial nerve deficit.      Sensory: No sensory deficit.      Motor: No weakness.   Psychiatric:         Mood and Affect: Mood normal.         Behavior: Behavior normal.           Inpatient Medications:  aspirin, 81 mg, oral, Daily  atorvastatin, 40 mg, oral, Daily  donepezil, 10 mg, oral, Nightly  heparin (porcine), 5,000 Units, subcutaneous, q8h LISA  memantine, 10 mg, oral, BID  pantoprazole, 40 mg, oral, Daily  sennosides, 2 tablet, oral, BID  sertraline, 100 mg, oral, BID      PRN medications: acetaminophen, ALPRAZolam, oxyCODONE    Outpatient Medications:  Prior to Admission medications    Medication Sig Start Date End Date Taking? Authorizing Provider   ALPRAZolam (Xanax) 0.5 mg  tablet Take 1 tablet (0.5 mg) by mouth 3 times a day as needed for anxiety. 2/19/24 3/20/24  Manoj Bustamante MD   amLODIPine (Norvasc) 5 mg tablet Take 1 tablet (5 mg) by mouth once daily. 5/2/23   Manoj Bustamante MD   aspirin 81 mg EC tablet Take 1 tablet (81 mg) by mouth once daily.    Historical Provider, MD   atorvastatin (Lipitor) 40 mg tablet Take 1 tablet (40 mg) by mouth once daily. 5/2/23   Manoj Bustamante MD   busPIRone (Buspar) 10 mg tablet Take 1 tablet (10 mg) by mouth 3 times a day. 1/2/24   Manoj Bustamante MD   carvedilol (Coreg) 12.5 mg tablet Take 1 tablet (12.5 mg) by mouth 2 times a day with meals.    Historical Provider, MD   cholecalciferol (Vitamin D-3) 1,250 mcg (50,000 unit) capsule Take 1 capsule (50,000 Units) by mouth 1 (one) time per week. 9/21/23   Manoj Bustamante MD   donepezil (Aricept) 10 mg tablet Take 1 tablet (10 mg) by mouth once daily at bedtime. 9/21/23   Manoj Bustamante MD   losartan (Cozaar) 25 mg tablet Take 1 tablet (25 mg) by mouth 2 times a day. 8/24/23   Manoj Bustamante MD   Lumigan 0.01 % ophthalmic solution Administer into affected eye(s) once daily.    Historical Provider, MD   melatonin 10 mg capsule Take by mouth.    Historical Provider, MD   memantine (Namenda) 10 mg tablet Take 1 tablet (10 mg) by mouth 2 times a day. 9/21/23   Manoj Bustamante MD   pantoprazole (ProtoNix) 40 mg EC tablet Take 1 tablet (40 mg) by mouth once daily. 5/2/23   Manoj Bustamante MD   rOPINIRole (Requip) 0.5 mg tablet Take 1 tablet (0.5 mg) by mouth once daily at bedtime. 5/2/23   Manoj Bustamante MD   sertraline (Zoloft) 100 mg tablet Take 1 tablet (100 mg) by mouth 2 times a day. 12/18/23   Manoj Bustamante MD   ALPRAZolam (Xanax) 0.5 mg tablet Take 1 tablet (0.5 mg) by mouth 3 times a day as needed for anxiety. 1/22/24 2/19/24  Manoj Bustamante MD       LABS AND IMAGING:     Last Labs:  CBC - 2/25/2024: 11:39 AM  10.9 13.9 262    41.6      CMP  - 2/25/2024: 11:39 AM  9.6 7.4 13 --- 0.5   _ 4.7 11 74      PTT - No results in last year.  _   _ _     Troponin I, High Sensitivity   Date/Time Value Ref Range Status   02/25/2024 01:09 PM 56 (HH) 0 - 13 ng/L Final   02/25/2024 11:39 AM 64 (HH) 0 - 13 ng/L Final     VLDL   Date/Time Value Ref Range Status   05/18/2021 11:50 AM 23 0 - 40 mg/dL Final   01/21/2020 10:51 AM 15 0 - 40 mg/dL Final

## 2024-02-26 ENCOUNTER — APPOINTMENT (OUTPATIENT)
Dept: RADIOLOGY | Facility: HOSPITAL | Age: 84
End: 2024-02-26
Payer: MEDICARE

## 2024-02-26 ENCOUNTER — APPOINTMENT (OUTPATIENT)
Dept: CARDIOLOGY | Facility: HOSPITAL | Age: 84
End: 2024-02-26
Payer: MEDICARE

## 2024-02-26 LAB
AORTIC VALVE MEAN GRADIENT: 5 MMHG
AORTIC VALVE PEAK VELOCITY: 1.54 M/S
AV PEAK GRADIENT: 9.5 MMHG
AVA (PEAK VEL): 1.67 CM2
AVA (VTI): 1.59 CM2
EJECTION FRACTION APICAL 4 CHAMBER: 58.6
EJECTION FRACTION: 61 %
LEFT ATRIUM VOLUME AREA LENGTH INDEX BSA: 26.3 ML/M2
LEFT VENTRICLE INTERNAL DIMENSION DIASTOLE: 4.51 CM (ref 3.5–6)
LEFT VENTRICULAR OUTFLOW TRACT DIAMETER: 1.7 CM
MITRAL VALVE E/A RATIO: 1.38
MITRAL VALVE E/E' RATIO: 19.78
RIGHT VENTRICLE FREE WALL PEAK S': 11.4 CM/S
RIGHT VENTRICLE PEAK SYSTOLIC PRESSURE: 32.2 MMHG
TRICUSPID ANNULAR PLANE SYSTOLIC EXCURSION: 2.1 CM

## 2024-02-26 PROCEDURE — 2500000001 HC RX 250 WO HCPCS SELF ADMINISTERED DRUGS (ALT 637 FOR MEDICARE OP): Performed by: INTERNAL MEDICINE

## 2024-02-26 PROCEDURE — 2500000004 HC RX 250 GENERAL PHARMACY W/ HCPCS (ALT 636 FOR OP/ED): Performed by: INTERNAL MEDICINE

## 2024-02-26 PROCEDURE — G0378 HOSPITAL OBSERVATION PER HR: HCPCS

## 2024-02-26 PROCEDURE — 99221 1ST HOSP IP/OBS SF/LOW 40: CPT | Performed by: ORTHOPAEDIC SURGERY

## 2024-02-26 PROCEDURE — 27520 TREAT KNEECAP FRACTURE: CPT | Performed by: ORTHOPAEDIC SURGERY

## 2024-02-26 PROCEDURE — 2500000002 HC RX 250 W HCPCS SELF ADMINISTERED DRUGS (ALT 637 FOR MEDICARE OP, ALT 636 FOR OP/ED)

## 2024-02-26 PROCEDURE — 73700 CT LOWER EXTREMITY W/O DYE: CPT | Mod: RT

## 2024-02-26 PROCEDURE — 93306 TTE W/DOPPLER COMPLETE: CPT

## 2024-02-26 PROCEDURE — 97161 PT EVAL LOW COMPLEX 20 MIN: CPT | Mod: GP | Performed by: PHYSICAL THERAPIST

## 2024-02-26 PROCEDURE — 93306 TTE W/DOPPLER COMPLETE: CPT | Performed by: INTERNAL MEDICINE

## 2024-02-26 PROCEDURE — 2500000001 HC RX 250 WO HCPCS SELF ADMINISTERED DRUGS (ALT 637 FOR MEDICARE OP): Performed by: STUDENT IN AN ORGANIZED HEALTH CARE EDUCATION/TRAINING PROGRAM

## 2024-02-26 PROCEDURE — 97165 OT EVAL LOW COMPLEX 30 MIN: CPT | Mod: GO

## 2024-02-26 PROCEDURE — 2500000002 HC RX 250 W HCPCS SELF ADMINISTERED DRUGS (ALT 637 FOR MEDICARE OP, ALT 636 FOR OP/ED): Performed by: INTERNAL MEDICINE

## 2024-02-26 PROCEDURE — S4991 NICOTINE PATCH NONLEGEND: HCPCS

## 2024-02-26 RX ORDER — ROPINIROLE 0.25 MG/1
0.5 TABLET, FILM COATED ORAL NIGHTLY
Status: DISCONTINUED | OUTPATIENT
Start: 2024-02-26 | End: 2024-02-27 | Stop reason: HOSPADM

## 2024-02-26 RX ORDER — TALC
6 POWDER (GRAM) TOPICAL NIGHTLY PRN
Status: DISCONTINUED | OUTPATIENT
Start: 2024-02-26 | End: 2024-02-27 | Stop reason: HOSPADM

## 2024-02-26 RX ORDER — AMLODIPINE BESYLATE 5 MG/1
5 TABLET ORAL DAILY
Status: DISCONTINUED | OUTPATIENT
Start: 2024-02-26 | End: 2024-02-26

## 2024-02-26 RX ORDER — IBUPROFEN 200 MG
1 TABLET ORAL DAILY
Status: DISCONTINUED | OUTPATIENT
Start: 2024-02-26 | End: 2024-02-27 | Stop reason: HOSPADM

## 2024-02-26 RX ORDER — LOSARTAN POTASSIUM 25 MG/1
25 TABLET ORAL 2 TIMES DAILY
Status: DISCONTINUED | OUTPATIENT
Start: 2024-02-26 | End: 2024-02-27 | Stop reason: HOSPADM

## 2024-02-26 RX ORDER — AMLODIPINE BESYLATE 5 MG/1
5 TABLET ORAL DAILY
Status: DISCONTINUED | OUTPATIENT
Start: 2024-02-27 | End: 2024-02-27 | Stop reason: HOSPADM

## 2024-02-26 RX ADMIN — Medication 6 MG: at 23:21

## 2024-02-26 RX ADMIN — DONEPEZIL HYDROCHLORIDE 10 MG: 5 TABLET ORAL at 21:21

## 2024-02-26 RX ADMIN — OXYCODONE HYDROCHLORIDE 5 MG: 5 TABLET ORAL at 09:11

## 2024-02-26 RX ADMIN — MEMANTINE HYDROCHLORIDE 10 MG: 5 TABLET, FILM COATED ORAL at 21:21

## 2024-02-26 RX ADMIN — HEPARIN SODIUM 5000 UNITS: 5000 INJECTION INTRAVENOUS; SUBCUTANEOUS at 14:57

## 2024-02-26 RX ADMIN — MEMANTINE HYDROCHLORIDE 10 MG: 5 TABLET, FILM COATED ORAL at 08:07

## 2024-02-26 RX ADMIN — SERTRALINE HYDROCHLORIDE 100 MG: 50 TABLET, FILM COATED ORAL at 21:21

## 2024-02-26 RX ADMIN — STANDARDIZED SENNA CONCENTRATE 17.2 MG: 8.6 TABLET ORAL at 08:07

## 2024-02-26 RX ADMIN — STANDARDIZED SENNA CONCENTRATE 17.2 MG: 8.6 TABLET ORAL at 21:22

## 2024-02-26 RX ADMIN — ATORVASTATIN CALCIUM 40 MG: 20 TABLET ORAL at 08:07

## 2024-02-26 RX ADMIN — HEPARIN SODIUM 5000 UNITS: 5000 INJECTION INTRAVENOUS; SUBCUTANEOUS at 23:22

## 2024-02-26 RX ADMIN — ASPIRIN 81 MG: 81 TABLET, COATED ORAL at 08:07

## 2024-02-26 RX ADMIN — ALPRAZOLAM 0.5 MG: 0.5 TABLET ORAL at 21:28

## 2024-02-26 RX ADMIN — NICOTINE 1 PATCH: 14 PATCH, EXTENDED RELEASE TRANSDERMAL at 11:59

## 2024-02-26 RX ADMIN — BUSPIRONE HYDROCHLORIDE 10 MG: 5 TABLET ORAL at 08:07

## 2024-02-26 RX ADMIN — BUSPIRONE HYDROCHLORIDE 10 MG: 5 TABLET ORAL at 21:21

## 2024-02-26 RX ADMIN — PANTOPRAZOLE SODIUM 40 MG: 40 TABLET, DELAYED RELEASE ORAL at 05:16

## 2024-02-26 RX ADMIN — HEPARIN SODIUM 5000 UNITS: 5000 INJECTION INTRAVENOUS; SUBCUTANEOUS at 05:16

## 2024-02-26 RX ADMIN — ALPRAZOLAM 0.5 MG: 0.5 TABLET ORAL at 14:57

## 2024-02-26 RX ADMIN — BUSPIRONE HYDROCHLORIDE 10 MG: 5 TABLET ORAL at 14:57

## 2024-02-26 RX ADMIN — SERTRALINE HYDROCHLORIDE 100 MG: 50 TABLET, FILM COATED ORAL at 08:07

## 2024-02-26 RX ADMIN — CARVEDILOL 12.5 MG: 12.5 TABLET, FILM COATED ORAL at 17:16

## 2024-02-26 RX ADMIN — CARVEDILOL 12.5 MG: 12.5 TABLET, FILM COATED ORAL at 08:07

## 2024-02-26 ASSESSMENT — COGNITIVE AND FUNCTIONAL STATUS - GENERAL
MOVING TO AND FROM BED TO CHAIR: A LOT
MOVING FROM LYING ON BACK TO SITTING ON SIDE OF FLAT BED WITH BEDRAILS: A LITTLE
DRESSING REGULAR UPPER BODY CLOTHING: A LITTLE
WALKING IN HOSPITAL ROOM: TOTAL
DAILY ACTIVITIY SCORE: 15
DRESSING REGULAR LOWER BODY CLOTHING: A LOT
DAILY ACTIVITIY SCORE: 17
STANDING UP FROM CHAIR USING ARMS: A LOT
TURNING FROM BACK TO SIDE WHILE IN FLAT BAD: A LITTLE
MOVING TO AND FROM BED TO CHAIR: A LOT
DRESSING REGULAR UPPER BODY CLOTHING: A LITTLE
TURNING FROM BACK TO SIDE WHILE IN FLAT BAD: A LITTLE
DRESSING REGULAR LOWER BODY CLOTHING: A LOT
STANDING UP FROM CHAIR USING ARMS: A LOT
HELP NEEDED FOR BATHING: A LITTLE
CLIMB 3 TO 5 STEPS WITH RAILING: TOTAL
MOVING FROM LYING ON BACK TO SITTING ON SIDE OF FLAT BED WITH BEDRAILS: A LITTLE
WALKING IN HOSPITAL ROOM: TOTAL
MOBILITY SCORE: 12
TOILETING: A LOT
HELP NEEDED FOR BATHING: A LOT
TOILETING: A LITTLE
PERSONAL GROOMING: A LOT
CLIMB 3 TO 5 STEPS WITH RAILING: TOTAL
MOBILITY SCORE: 12
PERSONAL GROOMING: A LOT

## 2024-02-26 ASSESSMENT — ENCOUNTER SYMPTOMS
CHEST TIGHTNESS: 0
CONSTITUTIONAL NEGATIVE: 1
ALLERGIC/IMMUNOLOGIC NEGATIVE: 1
FEVER: 0
CARDIOVASCULAR NEGATIVE: 1
GASTROINTESTINAL NEGATIVE: 1
ENDOCRINE NEGATIVE: 1
SHORTNESS OF BREATH: 0
PSYCHIATRIC NEGATIVE: 1
ACTIVITY CHANGE: 0
EYES NEGATIVE: 1
ARTHRALGIAS: 1
HEMATOLOGIC/LYMPHATIC NEGATIVE: 1

## 2024-02-26 ASSESSMENT — PAIN - FUNCTIONAL ASSESSMENT
PAIN_FUNCTIONAL_ASSESSMENT: 0-10
PAIN_FUNCTIONAL_ASSESSMENT: 0-10
PAIN_FUNCTIONAL_ASSESSMENT: WONG-BAKER FACES

## 2024-02-26 ASSESSMENT — PAIN SCALES - GENERAL
PAINLEVEL_OUTOF10: 8
PAINLEVEL_OUTOF10: 0 - NO PAIN

## 2024-02-26 ASSESSMENT — PAIN DESCRIPTION - LOCATION: LOCATION: KNEE

## 2024-02-26 ASSESSMENT — PAIN SCALES - WONG BAKER: WONGBAKER_NUMERICALRESPONSE: NO HURT

## 2024-02-26 ASSESSMENT — PAIN DESCRIPTION - ORIENTATION: ORIENTATION: RIGHT

## 2024-02-26 ASSESSMENT — ACTIVITIES OF DAILY LIVING (ADL): BATHING_ASSISTANCE: MAXIMAL
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
## 2024-02-26 NOTE — CARE PLAN
The patient's goals for the shift include      The clinical goals for the shift include sit in chair for 30 minutes

## 2024-02-26 NOTE — PROGRESS NOTES
"Daily Progress Note    Jodi Nuñez is a 83 y.o. female on day 0 of admission presenting with Closed fracture of patella, initial encounter.    Subjective   Patient seen resting comfortably with family at bedside.  Knee immobilizer is in place.  Patient seen by PT AM-PAC score 12 recommend rehab on discharge.  Patient denies shortness of breath or chest pain.       Objective     Physical Exam    Physical Exam  Constitutional:       Appearance: Normal appearance.   HENT:      Head: Normocephalic.      Mouth/Throat:      Mouth: Mucous membranes are moist.   Eyes:      Pupils: Pupils are equal, round, and reactive to light.   Cardiovascular:      Rate and Rhythm: Normal rate and regular rhythm.      Heart sounds: Normal heart sounds, S1 normal and S2 normal.   Pulmonary:      Effort: Pulmonary effort is normal.      Breath sounds: Normal breath sounds.   Abdominal:      General: Bowel sounds are normal.      Palpations: Abdomen is soft.   Musculoskeletal:         General: Normal range of motion.      Cervical back: Neck supple.      Comments: Right patella fracture/knee immobilizer in place   Skin:     General: Skin is warm.   Neurological:      Mental Status: She is alert and oriented to person, place, and time.      Motor: Weakness present.   Psychiatric:         Mood and Affect: Mood normal.         Behavior: Behavior normal.         Last Recorded Vitals  Blood pressure 170/74, pulse 61, temperature 36.7 °C (98.1 °F), resp. rate 16, height 1.549 m (5' 0.98\"), weight 55 kg (121 lb 4.1 oz), SpO2 93 %.  Intake/Output last 3 Shifts:  I/O last 3 completed shifts:  In: 200 (3.6 mL/kg) [P.O.:200]  Out: 750 (13.6 mL/kg) [Urine:750 (0.4 mL/kg/hr)]  Weight: 55 kg     Medications  Scheduled medications  aspirin, 81 mg, oral, Daily  atorvastatin, 40 mg, oral, Daily  busPIRone, 10 mg, oral, TID  carvedilol, 12.5 mg, oral, BID with meals  donepezil, 10 mg, oral, Nightly  heparin (porcine), 5,000 Units, subcutaneous, q8h " [facility]  memantine, 10 mg, oral, BID  pantoprazole, 40 mg, oral, Daily  sennosides, 2 tablet, oral, BID  sertraline, 100 mg, oral, BID      Continuous medications     PRN medications  PRN medications: acetaminophen, albuterol, ALPRAZolam, oxyCODONE    Labs  CBC:   Results from last 7 days   Lab Units 02/25/24  1139   WBC AUTO x10*3/uL 10.9   RBC AUTO x10*6/uL 4.43   HEMOGLOBIN g/dL 13.9   HEMATOCRIT % 41.6   MCV fL 94   MCH pg 31.4   MCHC g/dL 33.4   RDW % 16.1*   PLATELETS AUTO x10*3/uL 262     CMP:    Results from last 7 days   Lab Units 02/25/24  1139   SODIUM mmol/L 134*   POTASSIUM mmol/L 4.5   CHLORIDE mmol/L 102   CO2 mmol/L 23   BUN mg/dL 21   CREATININE mg/dL 1.54*   GLUCOSE mg/dL 111*   PROTEIN TOTAL g/dL 7.4   CALCIUM mg/dL 9.6   BILIRUBIN TOTAL mg/dL 0.5   ALK PHOS U/L 74   AST U/L 13   ALT U/L 11     BMP:    Results from last 7 days   Lab Units 02/25/24  1139   SODIUM mmol/L 134*   POTASSIUM mmol/L 4.5   CHLORIDE mmol/L 102   CO2 mmol/L 23   BUN mg/dL 21   CREATININE mg/dL 1.54*   CALCIUM mg/dL 9.6   GLUCOSE mg/dL 111*     Magnesium:  Results from last 7 days   Lab Units 02/25/24  1139   MAGNESIUM mg/dL 2.10     Troponin:    Results from last 7 days   Lab Units 02/25/24  1309 02/25/24  1139   TROPHS ng/L 56* 64*       Assessment/Plan    Mechanical fall  Elevated troponin  CAD  CKD 3  HTN/dementia  Anxiety/depression  COPD  Tobacco abuse    -X-ray of right knee shows suspected nondisplaced intra-articular fracture of the patella  -CT shows nondisplaced right patella fracture with large joint effusion and possible sprain of the MCL  -Orthopedics consulted, WBAT, follow-up outpatient  -Maintain knee immobilizer  -Elevated troponin presumed demand ischemia  -Troponins are flat  -Echo shows  -Continue ASA and statin  -Creatinine at baseline  -As needed bronchodilators  -Nicotine patch and smoking cessation  -Patient has bradycardia this is not new will resume home meds with caution  -PT/OT evaluation  -TCC for  discharge planning      DVTp: Heparin subcu    PLAN: Pre-CERT to SEJAL Lopez-CNP    Plan of care was discussed extensively with patient.  Patient verbalized understanding through teach back method.  All question and concerns addressed upon examination.    Of note, this documentation is completed using the Dragon Dictation system (voice recognition software). There may be spelling and/or grammatical errors that were not corrected prior to final submission.

## 2024-02-26 NOTE — PROGRESS NOTES
02/26/24 1454   Discharge Planning   Home or Post Acute Services Post acute facilities (Rehab/SNF/etc)   Type of Post Acute Facility Services Skilled nursing   Patient expects to be discharged to: SNF   Does the patient need discharge transport arranged? Yes   RoundTrip coordination needed? Yes   Has discharge transport been arranged? No   Patient Choice   Provider Choice list and CMS website (https://medicare.gov/care-compare#search) for post-acute Quality and Resource Measure Data were provided and reviewed with: Patient;Family   Patient / Family choosing to utilize agency / facility established prior to hospitalization Yes     Referral sent to [facility]. Awaiting facility response.     3:20pm- Pt accepted to facility. Insurance precert requested.

## 2024-02-26 NOTE — PROGRESS NOTES
"Occupational Therapy    Evaluation/Treatment    Patient Name: Jodi Nuñez  MRN: [id number]  : 1940  Today's Date: 24  Time Calculation  Start Time: 741  Stop Time: 753  Time Calculation (min): 12 min       Assessment:  End of Session Communication: Bedside nurse (Need for longer knee immobilizer, NWB status and use of ww.)  End of Session Patient Position: Up in chair, Alarm on (LE's elevated, alarm on)  OT Assessment Results: Decreased ADL status, Decreased upper extremity strength, Decreased endurance, Decreased functional mobility    Plan:  Treatment Interventions: ADL retraining, Functional transfer training, Endurance training  OT Frequency: 2 times per week  OT Discharge Recommendations: Moderate intensity level of continued care  Equipment Recommended upon Discharge: Wheeled walker  OT - OK to Discharge: Yes (when medically appropriate)  Treatment Interventions: ADL retraining, Functional transfer training, Endurance training  Subjective               General:   OT Received On: 24  General  Reason for Referral: ADL impairment  Referred By: PT/OT 24 [place] NWB/knee immobilizer RLE per Mi CNP for orthopedics  Past Medical History Relevant to Rehab: HLD, HTN, Arthritis, A Fib, Dementia, Anxiety, cad, Glaucoma, memory loss, hyster, ataxic gait, + smoker, RLS, Tremor  Prior to Session Communication: Bedside nurse  Patient Position Received: Bed, 3 rail up, Alarm on  General Comment: To ED 24 for a couple falls. Last one rolled OOB. Troponin 56 (downward trend); + UA; XR 24 Right knee suspected non-displaced intra-articular  patellar fracture    Precautions:  Hearing/Visual Limitations: Glasses  LE Weight Bearing Status:  (NWB RLE with knee immobilizer)  Medical Precautions: Fall precautions  Braces Applied: 14\" knee immobilizer in place, discussed with RN that patient needs longer knee immobilizer to prevent ROM at knee           Pain:  Pain Assessment  Pain Score: 0 - " No pain  Objective     Cognition:  Overall Cognitive Status: Within Functional Limits  Orientation Level: Oriented X4  Following Commands:  (Follows 1 step commands greater than 90% of time)             Home Living:  Home Living Comments: Per patient report resides with daughter (available 24/7) in 1 story home 3 steps with handrail to enter.  Owns cane.    Prior Function:  Prior Function Comments: Independent in mobility, ADLs and shares IADLs with cane PRN. Daughter assists with medication management. Reports  2 falls over past 3 months. Does not drive, neither does daughter, Uses [employer] or [address]           Activities of Daily Living:   Eating Assistance: Independent  Grooming Assistance: Maximal (std at sink)  Bathing Assistance: Maximal  UE Dressing Assistance: Moderate  LE Dressing Assistance: Maximal  Toileting Assistance with Device: Maximal  Functional Assistance:  (pt ambulated 3' from bed to chair with mod A, difficulty maintaining NWB R LE)                         Activity Tolerance:  Endurance: Endurance does not limit participation in activity           Bed Mobility/Transfers: Bed Mobility  Bed Mobility:  (Supine > sit with HOB 50 degrees CGA, Able to reach across and use side rail and scoot LEs over EOB and sit up. Increased time)  Transfers  Transfer:  (Sit > stand at bed level 2 trials + 2 moderate assist much cueing for hand positon and to keep RLE NWB. Tends to put down on floor. Adopts flexed posture, vc to straighten up. Difficutly with concept of straightening elbows to offload LE.)                Balance:  Static Sitting: good  Dynamic Sitting: fair   Static Standing: fair -  Dynamic Standing: poor        Vision:Vision - Basic Assessment  Current Vision: Wears glasses all the time        Sensation:  Light Touch: No apparent deficits    Strength:  Strength Comments: B UE 4/5 throughout            Extremities: RUE   RUE : Within Functional Limits and LUE   LUE: Within Functional  Limits    Outcome Measures: [facility] Daily Activity  Putting on and taking off regular lower body clothing: A lot  Bathing (including washing, rinsing, drying): A lot  Putting on and taking off regular upper body clothing: A little  Toileting, which includes using toilet, bedpan or urinal: A lot  Taking care of personal grooming such as brushing teeth: A lot  Eating Meals: None  Daily Activity - Total Score: 15    Education Documentation  ADL Training, taught by Christina Hilliard, OT at 2/26/2024 12:24 PM.  Learner: Patient  Readiness: Acceptance  Method: Explanation  Response: Verbalizes Understanding, Needs Reinforcement      Goals:  Encounter Problems       Encounter Problems (Active)       OT Goals       pt will transfer to bed ,chair, toilet with CGA (Progressing)       Start:  02/26/24    Expected End:  03/11/24            Pt will demo fair + dyn std balance with aDLS (Progressing)       Start:  02/26/24    Expected End:  03/11/24            Pt will dress LB with SBA (Progressing)       Start:  02/26/24    Expected End:  03/11/24

## 2024-02-26 NOTE — PROGRESS NOTES
Physical Therapy    Physical Therapy Evaluation    Patient Name: Jodi Nuñez  MRN: [id number]  Today's Date: 2/26/2024   Time Calculation  Start Time: 0740  Stop Time: 0755  Time Calculation (min): 15 min    Assessment/Plan   PT Assessment  PT Assessment Results: Decreased strength, Decreased endurance, Impaired balance, Decreased mobility, Decreased safety awareness  Rehab Prognosis: Good  Evaluation/Treatment Tolerance: Patient limited by fatigue  End of Session Communication:  (Need for longer knee immobilizer, NWB status and use of ww.)  Assessment Comment: Patient will benefit from additional PT to increase mobility and activity tolerance. Monitor ortho recommendations.  End of Session Patient Position:  (LEs elevated)  IP OR SWING BED PT PLAN  Inpatient or Swing Bed: Inpatient  PT Plan  Treatment/Interventions: Bed mobility, Gait training, Transfer training, Balance training, Strengthening, Therapeutic activity  PT Plan: Skilled PT  PT Frequency: 4 times per week  PT Discharge Recommendations:  (Continued PT in moderate intensity and moderate frequency in [facility] facility)  Equipment Recommended upon Discharge: Wheeled walker  PT Recommended Transfer Status: Assist x2  PT - OK to Discharge: to [facility] facility with continued PT once deemed medicallly appropruate)      Subjective   General Visit Information:  General  Reason for Referral: Impaired mobility  Referred By: PT/OT 2/25/24 [place] NWB/knee immobilizer RLE per Adjondi CNP for orthopedics  Past Medical History Relevant to Rehab: HLD, HTN, Arthritis, A Fib, Dementia, Anxiety, cad, Glaucoma, memory loss, hyster, ataxic gait, + smoker, RLS, Tremor  Prior to Session Communication: Bedside nurse  Patient Position Received: Bed, 3 rail up, Alarm on  General Comment: To ED 86004 for couple falls. Last one rolled OOB. Troponin 56 (downward trend); + UA; XR 2/25/24 Right knee suspected non-displaced intra-articular  patellar fracture  Home Living:  Home  "Living  Home Living Comments: Per patient report resides with daughter (available 24/7) in 1 story home 3 steps with handrail to enter.  Owns cane.  Prior Level of Function:  Prior Function Per Pt/Caregiver Report  Prior Function Comments: Independent in mobility, ADLs and shares IADLs with cane PRN. Daughter assists with medication management. Reports  2 falls over past 3 months. Does no drive, neither does daughter, Uses [employer].  Precautions:  Precautions  Hearing/Visual Limitations: Glasses  LE Weight Bearing Status:  (NWB RLE with knee immobilizer)  Medical Precautions: Fall precautions (Knee immobilizer NO ROM KNEE)  Braces Applied: 14\" knee immobilzier in place, discussed with RN that jen needs longer knee immobilizer to prevent ROM at knee       Objective   Pain:  Pain Assessment  Pain Score: 0 - No pain  Cognition:  Cognition  Overall Cognitive Status: Within Functional Limits  Orientation Level: Oriented X4  Following Commands:  (Follows 1 step commands greater than 90% of time)    General Assessments:  General Observation  General Observation: Patient lying in bed with knee immobilizer RLE. Agreeable to PT/OT        Static Sitting Balance  Static Sitting-Comment/Number of Minutes: Good  Dynamic Sitting Balance  Dynamic Sitting-Comments: Fair +    Static Standing Balance  Static Standing-Comment/Number of Minutes: Fair  Dynamic Standing Balance  Dynamic Standing-Comments: Fair -  Functional Assessments:  Bed Mobility  Bed Mobility:  (Supine > sit with HOB 30 degrees CGA, Able to reach across and use side rail and scoot LEs over EOB and sit up)    Transfers  Transfer:  (Sit > stand at bed level 2 trials + 2 moderate assist much cueing for hand positon and to keep RLE NWB. Tends to put down on floor. Adopts flexed posture, vc to straighten up. Difficutly with concept of straightening elbows to offload LE. Stand > sit *+ 2 moderate assist with verbal cues to reach back.     Ambulation/Gait " "Training  Ambulation/Gait Training Performed:  (Patient requries maximal cueing to straighten arms and \"hop\" with LLE Tends to put RLE down and load through LE when advancing LLE Requires maximal assist x2 Able to demonstrate multiple little \"hops\" to get to recliner.)  Extremity/Trunk Assessments:  RUE   RUE : Within Functional Limits  LUE   LUE: Within Functional Limits  RLE   RLE :  (AROM HIP/ankle WFL, knee not tested due to recent patellar fracture; MMT ankle 4/5)  LLE   LLE :  (AROM: HIp/knee/ankle WFL MMT 3+/5 grossly)  Outcome Measures:  [facility] Basic Mobility  Turning from your back to your side while in a flat bed without using bedrails: A little  Moving from lying on your back to sitting on the side of a flat bed without using bedrails: A little  Moving to and from bed to chair (including a wheelchair): A lot  Standing up from a chair using your arms (e.g. wheelchair or bedside chair): A lot  To walk in hospital room: Total  Climbing 3-5 steps with railing: Total  Basic Mobility - Total Score: 12    Encounter Problems       Encounter Problems (Active)       PT Problem       Bed mobility supine <> sit independent  (Progressing)       Start:  02/26/24    Expected End:  03/25/24            Transfers sit <> stand with ww + 1 minimal assist  (Progressing)       Start:  02/26/24    Expected End:  03/25/24            Patient to ambulate with ww 20' + 1 minimal assist  (Progressing)       Start:  02/26/24    Expected End:  03/25/24            Patient to actively participate in  8 minutes of therapeutic exercise  (Not Progressing)       Start:  02/26/24    Expected End:  03/25/24               Pain - Adult              Education Documentation  Precautions, taught by Summer Menon, PT at 2/26/2024 11:13 AM.  Learner: Patient  Readiness: Acceptance  Method: Demonstration, Explanation  Response: Needs Reinforcement, Demonstrated Understanding  Comment: Educated on wearing Knee immobilizler/  NO ROM, NWB RLE at this " time. Use of arms to support with ww so able to hop    Mobility Training, taught by Summer Menon PT at 2/26/2024 11:13 AM.  Learner: Patient  Readiness: Acceptance  Method: Demonstration, Explanation  Response: Needs Reinforcement, Demonstrated Understanding  Comment: Educated on wearing Knee immobilizler/  NO ROM, NWB RLE at this time. Use of arms to support with ww so able to hop

## 2024-02-26 NOTE — DISCHARGE INSTRUCTIONS
Weight bearing as tolerated to right lower extremity in knee immobilizer with no range of motion to right knee   Follow up with surgeon in two weeks

## 2024-02-26 NOTE — CARE PLAN
The patient's goals for the shift include      The clinical goals for the shift include maintain safety    Patient is progressing toward goals

## 2024-02-27 VITALS
OXYGEN SATURATION: 92 % | TEMPERATURE: 97.3 F | DIASTOLIC BLOOD PRESSURE: 65 MMHG | RESPIRATION RATE: 17 BRPM | HEART RATE: 51 BPM | WEIGHT: 121.25 LBS | BODY MASS INDEX: 22.89 KG/M2 | HEIGHT: 61 IN | SYSTOLIC BLOOD PRESSURE: 142 MMHG

## 2024-02-27 LAB
ANION GAP SERPL CALC-SCNC: 10 MMOL/L (ref 10–20)
BUN SERPL-MCNC: 30 MG/DL (ref 6–23)
CALCIUM SERPL-MCNC: 9.4 MG/DL (ref 8.6–10.3)
CARDIAC TROPONIN I PNL SERPL HS: 61 NG/L (ref 0–13)
CHLORIDE SERPL-SCNC: 108 MMOL/L (ref 98–107)
CO2 SERPL-SCNC: 24 MMOL/L (ref 21–32)
CREAT SERPL-MCNC: 1.7 MG/DL (ref 0.5–1.05)
EGFRCR SERPLBLD CKD-EPI 2021: 30 ML/MIN/1.73M*2
ERYTHROCYTE [DISTWIDTH] IN BLOOD BY AUTOMATED COUNT: 16.2 % (ref 11.5–14.5)
GLUCOSE SERPL-MCNC: 122 MG/DL (ref 74–99)
HCT VFR BLD AUTO: 38.7 % (ref 36–46)
HGB BLD-MCNC: 12.5 G/DL (ref 12–16)
HOLD SPECIMEN: NORMAL
MCH RBC QN AUTO: 30.1 PG (ref 26–34)
MCHC RBC AUTO-ENTMCNC: 32.3 G/DL (ref 32–36)
MCV RBC AUTO: 93 FL (ref 80–100)
NRBC BLD-RTO: 0 /100 WBCS (ref 0–0)
PLATELET # BLD AUTO: 242 X10*3/UL (ref 150–450)
POTASSIUM SERPL-SCNC: 4.5 MMOL/L (ref 3.5–5.3)
RBC # BLD AUTO: 4.15 X10*6/UL (ref 4–5.2)
SODIUM SERPL-SCNC: 137 MMOL/L (ref 136–145)
WBC # BLD AUTO: 12.8 X10*3/UL (ref 4.4–11.3)

## 2024-02-27 PROCEDURE — 2500000001 HC RX 250 WO HCPCS SELF ADMINISTERED DRUGS (ALT 637 FOR MEDICARE OP)

## 2024-02-27 PROCEDURE — 85027 COMPLETE CBC AUTOMATED: CPT

## 2024-02-27 PROCEDURE — 97535 SELF CARE MNGMENT TRAINING: CPT | Mod: GO

## 2024-02-27 PROCEDURE — 84484 ASSAY OF TROPONIN QUANT: CPT

## 2024-02-27 PROCEDURE — 2500000004 HC RX 250 GENERAL PHARMACY W/ HCPCS (ALT 636 FOR OP/ED): Performed by: INTERNAL MEDICINE

## 2024-02-27 PROCEDURE — 2500000002 HC RX 250 W HCPCS SELF ADMINISTERED DRUGS (ALT 637 FOR MEDICARE OP, ALT 636 FOR OP/ED)

## 2024-02-27 PROCEDURE — 2500000001 HC RX 250 WO HCPCS SELF ADMINISTERED DRUGS (ALT 637 FOR MEDICARE OP): Performed by: INTERNAL MEDICINE

## 2024-02-27 PROCEDURE — 36415 COLL VENOUS BLD VENIPUNCTURE: CPT

## 2024-02-27 PROCEDURE — 2500000002 HC RX 250 W HCPCS SELF ADMINISTERED DRUGS (ALT 637 FOR MEDICARE OP, ALT 636 FOR OP/ED): Performed by: INTERNAL MEDICINE

## 2024-02-27 PROCEDURE — G0378 HOSPITAL OBSERVATION PER HR: HCPCS

## 2024-02-27 PROCEDURE — S4991 NICOTINE PATCH NONLEGEND: HCPCS

## 2024-02-27 PROCEDURE — 97530 THERAPEUTIC ACTIVITIES: CPT | Mod: GO

## 2024-02-27 PROCEDURE — 80048 BASIC METABOLIC PNL TOTAL CA: CPT

## 2024-02-27 RX ORDER — IBUPROFEN 200 MG
1 TABLET ORAL DAILY
Qty: 30 PATCH | Refills: 0 | Status: SHIPPED | OUTPATIENT
Start: 2024-02-28 | End: 2024-03-29

## 2024-02-27 RX ADMIN — AMLODIPINE BESYLATE 5 MG: 5 TABLET ORAL at 08:32

## 2024-02-27 RX ADMIN — SERTRALINE HYDROCHLORIDE 100 MG: 50 TABLET, FILM COATED ORAL at 08:31

## 2024-02-27 RX ADMIN — ATORVASTATIN CALCIUM 40 MG: 20 TABLET ORAL at 08:32

## 2024-02-27 RX ADMIN — ASPIRIN 81 MG: 81 TABLET, COATED ORAL at 08:32

## 2024-02-27 RX ADMIN — OXYCODONE HYDROCHLORIDE 5 MG: 5 TABLET ORAL at 08:32

## 2024-02-27 RX ADMIN — ALPRAZOLAM 0.5 MG: 0.5 TABLET ORAL at 08:32

## 2024-02-27 RX ADMIN — ALPRAZOLAM 0.5 MG: 0.5 TABLET ORAL at 14:42

## 2024-02-27 RX ADMIN — PANTOPRAZOLE SODIUM 40 MG: 40 TABLET, DELAYED RELEASE ORAL at 06:19

## 2024-02-27 RX ADMIN — BUSPIRONE HYDROCHLORIDE 10 MG: 5 TABLET ORAL at 14:42

## 2024-02-27 RX ADMIN — BUSPIRONE HYDROCHLORIDE 10 MG: 5 TABLET ORAL at 08:32

## 2024-02-27 RX ADMIN — HEPARIN SODIUM 5000 UNITS: 5000 INJECTION INTRAVENOUS; SUBCUTANEOUS at 06:19

## 2024-02-27 RX ADMIN — MEMANTINE HYDROCHLORIDE 10 MG: 5 TABLET, FILM COATED ORAL at 08:32

## 2024-02-27 RX ADMIN — CARVEDILOL 12.5 MG: 12.5 TABLET, FILM COATED ORAL at 08:32

## 2024-02-27 RX ADMIN — NICOTINE 1 PATCH: 14 PATCH, EXTENDED RELEASE TRANSDERMAL at 08:32

## 2024-02-27 ASSESSMENT — COGNITIVE AND FUNCTIONAL STATUS - GENERAL
HELP NEEDED FOR BATHING: A LOT
TOILETING: TOTAL
PERSONAL GROOMING: A LITTLE
DAILY ACTIVITIY SCORE: 15
DRESSING REGULAR UPPER BODY CLOTHING: A LITTLE
DRESSING REGULAR LOWER BODY CLOTHING: A LOT

## 2024-02-27 ASSESSMENT — PAIN - FUNCTIONAL ASSESSMENT: PAIN_FUNCTIONAL_ASSESSMENT: 0-10

## 2024-02-27 ASSESSMENT — ACTIVITIES OF DAILY LIVING (ADL): HOME_MANAGEMENT_TIME_ENTRY: 10

## 2024-02-27 NOTE — PROGRESS NOTES
Met w/ pt and dtr. Confirms demo's ins and pcp. Pt has snf needs. Pt prefers snf at [facility]. Cinda hauser notified.

## 2024-02-27 NOTE — DISCHARGE SUMMARY
Discharge Diagnosis  Closed fracture of patella, initial encounter    Issues Requiring Follow-Up  Follow-up with Ortho in 2 weeks    Discharge Meds     Your medication list        START taking these medications        Instructions Last Dose Given Next Dose Due   nicotine 14 mg/24 hr patch  Commonly known as: Nicoderm CQ  Start taking on: February 28, 2024      Place 1 patch over 24 hours on the skin once daily. Do not start before February 28, 2024.              CONTINUE taking these medications        Instructions Last Dose Given Next Dose Due   ALPRAZolam 0.5 mg tablet  Commonly known as: Xanax      Take 1 tablet (0.5 mg) by mouth 3 times a day as needed for anxiety.       amLODIPine 5 mg tablet  Commonly known as: Norvasc      Take 1 tablet (5 mg) by mouth once daily.       aspirin 81 mg EC tablet           atorvastatin 40 mg tablet  Commonly known as: Lipitor      Take 1 tablet (40 mg) by mouth once daily.       busPIRone 10 mg tablet  Commonly known as: Buspar      Take 1 tablet (10 mg) by mouth 3 times a day.       carvedilol 12.5 mg tablet  Commonly known as: Coreg           cholecalciferol 50,000 unit capsule  Commonly known as: Vitamin D-3      Take 1 capsule (50,000 Units) by mouth 1 (one) time per week.       donepezil 10 mg tablet  Commonly known as: Aricept      Take 1 tablet (10 mg) by mouth once daily at bedtime.       losartan 25 mg tablet  Commonly known as: Cozaar      Take 1 tablet (25 mg) by mouth 2 times a day.       Lumigan 0.01 % ophthalmic solution  Generic drug: bimatoprost           melatonin 10 mg capsule           memantine 10 mg tablet  Commonly known as: Namenda      Take 1 tablet (10 mg) by mouth 2 times a day.       pantoprazole 40 mg EC tablet  Commonly known as: ProtoNix      Take 1 tablet (40 mg) by mouth once daily.       rOPINIRole 0.5 mg tablet  Commonly known as: Requip      Take 1 tablet (0.5 mg) by mouth once daily at bedtime.       sertraline 100 mg tablet  Commonly known  as: Zoloft      Take 1 tablet (100 mg) by mouth 2 times a day.                 Where to Get Your Medications        These medications were sent to [id number] - [place], [place] - [address]  [address], [place] [address]      Phone: [phone]   nicotine 14 mg/24 hr patch         Test Results Pending At Discharge  Pending Labs       No current pending labs.            Hospital Course  83-year-old female with significant past medical history of HTN, COPD, CAD, anxiety/depression, dementia, current everyday smoker who presented to the ER after a fall at home.  Patient states she rolled off the edge of her bed landing on her right knee.  She did not strike her head.  X-ray of the right knee showed nondisplaced right patella fracture with large joint effusion.  Orthopedics saw patient.  CT of the right knee showed nondisplaced right patella fracture.  No surgical intervention at this time.  Patient will be discharged WBAT with knee immobilizer in place.  Patient will be discharged to [facility] for further rehab.  On day of discharge patient hemodynamically stable.  Family at bedside and updated on plan.    Pertinent Physical Exam At Time of Discharge  Physical Exam  Constitutional:       Appearance: Normal appearance.   HENT:      Head: Normocephalic.      Mouth/Throat:      Mouth: Mucous membranes are moist.   Eyes:      Pupils: Pupils are equal, round, and reactive to light.   Cardiovascular:      Rate and Rhythm: Normal rate and regular rhythm.      Heart sounds: Normal heart sounds, S1 normal and S2 normal.   Pulmonary:      Effort: Pulmonary effort is normal.      Breath sounds: Normal breath sounds.   Abdominal:      General: Bowel sounds are normal.      Palpations: Abdomen is soft.   Musculoskeletal:         General: Normal range of motion.      Cervical back: Neck supple.      Comments: Right patella fracture/knee immobilizer in place   Skin:     General: Skin is warm.    Neurological:      Mental Status: She is alert and oriented to person, place, and time.      Motor: Weakness present.   Psychiatric:         Mood and Affect: Mood normal.         Behavior: Behavior normal.   Outpatient Follow-Up  Future Appointments   Date Time Provider Department Center   3/19/2024  2:30 PM Manoj Bustamante MD [id number] [place]   4/4/2024  2:10 PM Bhupendra Gorman MD [id number] [place]   8/14/2024  1:15 PM Serafin Kowalski MD [id number] [place]         Silvia Chang, APRN-CNP

## 2024-02-27 NOTE — HOSPITAL COURSE
83-year-old female with significant past medical history of HTN, COPD, CAD, anxiety/depression, dementia, current everyday smoker who presented to the ER after a fall at home.  Patient states she rolled off the edge of her bed landing on her right knee.  She did not strike her head.  X-ray of the right knee showed nondisplaced right patella fracture with large joint effusion.  Orthopedics saw patient.  CT of the right knee showed nondisplaced right patella fracture.  No surgical intervention at this time.  Patient will be discharged WBAT with knee immobilizer in place.  Patient will be discharged to [facility] for further rehab.  On day of discharge patient hemodynamically stable.  Family at bedside and updated on plan.

## 2024-02-27 NOTE — PROGRESS NOTES
Occupational Therapy    OT Treatment    Patient Name: Jodi Nuñez  MRN: [id number]  Today's Date: 2/27/2024  Time Calculation  Start Time: 0910  Stop Time: 0934  Time Calculation (min): 24 min         Assessment:  End of Session Communication: Bedside nurse  End of Session Patient Position: Up in chair, Alarm on     Plan:  Treatment Interventions: ADL retraining, Functional transfer training, Endurance training  OT Frequency: 2 times per week  OT Discharge Recommendations: Moderate intensity level of continued care  Equipment Recommended upon Discharge: Wheeled walker  OT - OK to Discharge: Yes (when medically appropriate)  Treatment Interventions: ADL retraining, Functional transfer training, Endurance training    Subjective   Previous Visit Info:  OT Last Visit  OT Received On: 02/27/24  General:  General  Prior to Session Communication: Bedside nurse  Patient Position Received: Bed, 2 rail up  General Comment: patients daughter present throughout and with several questions regarding expected recovery time and overall d/c planning; OT answering questions to her ability but also following up with TCC and RN regarding medical concerns; patient cooperative throughout  Precautions:  Precautions Comment: (R) KI to be donned AAT; (R) LE WBAT    Pain:  Pain Assessment  Pain Assessment: 0-10    Objective    Cognition:  Cognition  Overall Cognitive Status: Within Functional Limits    Activities of Daily Living:    Grooming  Grooming Comments: seated to complete hair care at MOD I level    Toileting  Toileting Comments: total assist 2/2 purwick in place    Bed Mobility/Transfers: Bed Mobility  Bed Mobility: Yes (completing supine > sit with MIN A for mobilizing (R) LE towards EOB)    Transfers  Transfer:  (completing sit <> stand and 3-4 steps from EOB to recliner with cues for sequencing as guarded of (R) LE; overall completing with MIN A for steadying)    Therapy/Activity: Therapeutic Exercise  Therapeutic Exercise  Performed:  (patient educated on completing ankle pumps/AROM while seated to facilitate blood flow and DVT prevention)    Outcome Measures:[facility] Daily Activity  Putting on and taking off regular lower body clothing: A lot  Bathing (including washing, rinsing, drying): A lot  Putting on and taking off regular upper body clothing: A little  Toileting, which includes using toilet, bedpan or urinal: Total  Taking care of personal grooming such as brushing teeth: A little  Eating Meals: None  Daily Activity - Total Score: 15        Education Documentation  ADL Training, taught by Keisha Roman OT at 2/27/2024 10:32 AM.  Learner: Patient  Readiness: Acceptance  Method: Explanation  Response: Verbalizes Understanding    Education Comments  No comments found.        OP EDUCATION:  Education  Individual(s) Educated: Patient, Child  Education Provided: Diagnosis & Precautions, Ergonomics and postural realignment, Joint protection and energy conservation, Fall precautons  Patient Response to Education: Patient/Caregiver Verbalized Understanding of Information    Goals:  Encounter Problems       Encounter Problems (Active)       OT Goals       pt will transfer to bed ,chair, toilet with CGA (Progressing)       Start:  02/26/24    Expected End:  03/11/24            Pt will demo fair + dyn std balance with aDLS (Progressing)       Start:  02/26/24    Expected End:  03/11/24            Pt will dress LB with SBA (Progressing)       Start:  02/26/24    Expected End:  03/11/24

## 2024-02-28 ENCOUNTER — NURSING HOME VISIT (OUTPATIENT)
Dept: POST ACUTE CARE | Facility: EXTERNAL LOCATION | Age: 84
End: 2024-02-28
Payer: MEDICARE

## 2024-02-28 VITALS — SYSTOLIC BLOOD PRESSURE: 134 MMHG | DIASTOLIC BLOOD PRESSURE: 78 MMHG | HEART RATE: 78 BPM

## 2024-02-28 DIAGNOSIS — G30.1 MODERATE LATE ONSET ALZHEIMER'S DEMENTIA WITHOUT BEHAVIORAL DISTURBANCE, PSYCHOTIC DISTURBANCE, MOOD DISTURBANCE, OR ANXIETY (MULTI): ICD-10-CM

## 2024-02-28 DIAGNOSIS — I25.10 CORONARY ARTERY DISEASE INVOLVING NATIVE CORONARY ARTERY OF NATIVE HEART WITHOUT ANGINA PECTORIS: ICD-10-CM

## 2024-02-28 DIAGNOSIS — F41.1 GENERALIZED ANXIETY DISORDER: ICD-10-CM

## 2024-02-28 DIAGNOSIS — F02.B0 MODERATE LATE ONSET ALZHEIMER'S DEMENTIA WITHOUT BEHAVIORAL DISTURBANCE, PSYCHOTIC DISTURBANCE, MOOD DISTURBANCE, OR ANXIETY (MULTI): ICD-10-CM

## 2024-02-28 DIAGNOSIS — G25.81 RESTLESS LEG SYNDROME: ICD-10-CM

## 2024-02-28 DIAGNOSIS — N18.31 CHRONIC KIDNEY DISEASE, STAGE 3A (MULTI): ICD-10-CM

## 2024-02-28 DIAGNOSIS — S82.009A CLOSED FRACTURE OF PATELLA, INITIAL ENCOUNTER: Primary | ICD-10-CM

## 2024-02-28 DIAGNOSIS — C65.1 MALIGNANT NEOPLASM OF RIGHT RENAL PELVIS (MULTI): ICD-10-CM

## 2024-02-28 DIAGNOSIS — I10 ESSENTIAL HYPERTENSION: ICD-10-CM

## 2024-02-28 PROCEDURE — 99306 1ST NF CARE HIGH MDM 50: CPT | Performed by: INTERNAL MEDICINE

## 2024-02-28 ASSESSMENT — ENCOUNTER SYMPTOMS
WOUND: 0
DIFFICULTY URINATING: 0
FATIGUE: 1
VOMITING: 0
CONFUSION: 1
SHORTNESS OF BREATH: 0
ARTHRALGIAS: 1
COUGH: 0
APNEA: 0
TREMORS: 1
NERVOUS/ANXIOUS: 0
DIZZINESS: 0
ACTIVITY CHANGE: 1
AGITATION: 0
ABDOMINAL PAIN: 0
NAUSEA: 0
WEAKNESS: 1

## 2024-02-28 NOTE — LETTER
Patient: Jodi Nuñez  : 1940    Encounter Date: 2024    Subjective  Patient ID: Jodi Nuñez is a 83 y.o. female who is acute skilled care and presents for initial visit for skilled nursing.    83-year-old female patient has been admitted because she fell while she was in the bed and she fell on her right side and there was a fracture of the lower pole of the right patella, it was a nondisplaced fracture.  It was nonsurgical fracture, patient was admitted, seen by orthopedics, immobilizer has been placed, weightbearing as tolerated has been asked.  This patient has history of dementia there is Alzheimer's dementia.  This patient has history of coronary artery disease coronary calcium score was high in the past, she has history of significant anxiety and depression because she is on Xanax and 200 mg of sertraline.  Patient also has a history of hypertension and restless leg syndrome.  Normally patient lives independently in a domiciliary setting.  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  When I saw this patient she was waking up she was able to answer some of my questions, she does not have that much of pain and discomfort although her right knee is swollen.  All the imaging report and hospitalization related data and information were reviewed.  Patient is DNR no intubation.  Because of her fracture of patella and impaired mobility patient is admitted here for skilled nursing and rehabilitation.         Review of Systems   Constitutional:  Positive for activity change and fatigue.   HENT:  Negative for congestion.    Eyes:  Negative for visual disturbance.   Respiratory:  Negative for apnea, cough and shortness of breath.    Cardiovascular:  Negative for chest pain.   Gastrointestinal:  Negative for abdominal pain, nausea and vomiting.   Genitourinary:  Negative for difficulty urinating.    Musculoskeletal:  Positive for arthralgias and gait problem.   Skin:  Negative for wound.   Neurological:  Positive for tremors and weakness. Negative for dizziness.   Psychiatric/Behavioral:  Positive for confusion. Negative for agitation. The patient is not nervous/anxious.        Objective  /78   Pulse 78     Physical Exam  Constitutional:       General: She is not in acute distress.     Appearance: Normal appearance. She is normal weight. She is not ill-appearing.   HENT:      Head: Normocephalic.   Eyes:      Conjunctiva/sclera: Conjunctivae normal.   Cardiovascular:      Rate and Rhythm: Normal rate and regular rhythm.      Pulses: Normal pulses.      Heart sounds: Normal heart sounds.   Pulmonary:      Breath sounds: Normal breath sounds. No rales.   Abdominal:      General: Abdomen is flat.      Palpations: Abdomen is soft.   Musculoskeletal:         General: Swelling and tenderness present.      Cervical back: Neck supple. No tenderness.   Skin:     General: Skin is warm and dry.   Neurological:      Mental Status: She is oriented to person, place, and time. Mental status is at baseline.   Psychiatric:         Mood and Affect: Mood normal.         Cognition and Memory: Cognition is impaired.         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  She  is listed to be on Xanax 0.5 mg 3 times a day as needed, amlodipine, aspirin, atorvastatin, buspirone 10 mg 3 times a day, carvedilol, donepezil, losartan, Namenda, nicotine replacement treatment 14 mg, pantoprazole, ropinirole, sertraline 200 mg and vitamin D.  Sertraline dose is quite high and patient's creatinine was 1.41 and sodium level was reviewed.  Physical therapy, Occupational Therapy evaluation are awaited.  Immobilizer will be left alone.  As the day passes by perhaps she will be much more awake and alert.  Nursing staff asked to attend and observe this patient frequently for next 48 to 72 hours.  Fracture will heal by itself, dementia must be baseline, dual therapy has been given, no angina, kidney functions could fluctuate, right renal pelvis cancer has been treated with nephrectomy.  Ropinirole to be continued.  She has been a smoker, her general condition does not look that great.  Hopefully she will do well with a graduated sessions of physical therapy occupational therapy and mobility.  Patient remains susceptible for falls.  Laboratories will be done as per our routine, all other routine care precautions has to be taken including protocol based precautions because of recent viral outbreak here in the facility.  Discussed with nursing staff, periodic assessment and follow-up will be done, patient remains at moderate risk of fall and low risk for recurrent hospitalization.     Goals    None           Electronically Signed By: Abilio Davis MD   2/28/24 11:41 AM

## 2024-02-28 NOTE — PROGRESS NOTES
Subjective   Patient ID: Jodi Nuñez is a 83 y.o. female who is acute skilled care and presents for initial visit for skilled nursing.    83-year-old female patient has been admitted because she fell while she was in the bed and she fell on her right side and there was a fracture of the lower pole of the right patella, it was a nondisplaced fracture.  It was nonsurgical fracture, patient was admitted, seen by orthopedics, immobilizer has been placed, weightbearing as tolerated has been asked.  This patient has history of dementia there is Alzheimer's dementia.  This patient has history of coronary artery disease coronary calcium score was high in the past, she has history of significant anxiety and depression because she is on Xanax and 200 mg of sertraline.  Patient also has a history of hypertension and restless leg syndrome.  Normally patient lives independently in a domiciliary setting.  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  When I saw this patient she was waking up she was able to answer some of my questions, she does not have that much of pain and discomfort although her right knee is swollen.  All the imaging report and hospitalization related data and information were reviewed.  Patient is DNR no intubation.  Because of her fracture of patella and impaired mobility patient is admitted here for skilled nursing and rehabilitation.         Review of Systems   Constitutional:  Positive for activity change and fatigue.   HENT:  Negative for congestion.    Eyes:  Negative for visual disturbance.   Respiratory:  Negative for apnea, cough and shortness of breath.    Cardiovascular:  Negative for chest pain.   Gastrointestinal:  Negative for abdominal pain, nausea and vomiting.   Genitourinary:  Negative for difficulty urinating.   Musculoskeletal:  Positive for arthralgias and gait problem.   Skin:   Negative for wound.   Neurological:  Positive for tremors and weakness. Negative for dizziness.   Psychiatric/Behavioral:  Positive for confusion. Negative for agitation. The patient is not nervous/anxious.        Objective   /78   Pulse 78     Physical Exam  Constitutional:       General: She is not in acute distress.     Appearance: Normal appearance. She is normal weight. She is not ill-appearing.   HENT:      Head: Normocephalic.   Eyes:      Conjunctiva/sclera: Conjunctivae normal.   Cardiovascular:      Rate and Rhythm: Normal rate and regular rhythm.      Pulses: Normal pulses.      Heart sounds: Normal heart sounds.   Pulmonary:      Breath sounds: Normal breath sounds. No rales.   Abdominal:      General: Abdomen is flat.      Palpations: Abdomen is soft.   Musculoskeletal:         General: Swelling and tenderness present.      Cervical back: Neck supple. No tenderness.   Skin:     General: Skin is warm and dry.   Neurological:      Mental Status: She is oriented to person, place, and time. Mental status is at baseline.   Psychiatric:         Mood and Affect: Mood normal.         Cognition and Memory: Cognition is impaired.         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  She is listed to be on Xanax 0.5 mg 3 times a day as needed, amlodipine,  aspirin, atorvastatin, buspirone 10 mg 3 times a day, carvedilol, donepezil, losartan, Namenda, nicotine replacement treatment 14 mg, pantoprazole, ropinirole, sertraline 200 mg and vitamin D.  Sertraline dose is quite high and patient's creatinine was 1.41 and sodium level was reviewed.  Physical therapy, Occupational Therapy evaluation are awaited.  Immobilizer will be left alone.  As the day passes by perhaps she will be much more awake and alert.  Nursing staff asked to attend and observe this patient frequently for next 48 to 72 hours.  Fracture will heal by itself, dementia must be baseline, dual therapy has been given, no angina, kidney functions could fluctuate, right renal pelvis cancer has been treated with nephrectomy.  Ropinirole to be continued.  She has been a smoker, her general condition does not look that great.  Hopefully she will do well with a graduated sessions of physical therapy occupational therapy and mobility.  Patient remains susceptible for falls.  Laboratories will be done as per our routine, all other routine care precautions has to be taken including protocol based precautions because of recent viral outbreak here in the facility.  Discussed with nursing staff, periodic assessment and follow-up will be done, patient remains at moderate risk of fall and low risk for recurrent hospitalization.     Goals    None

## 2024-02-29 DIAGNOSIS — I10 ESSENTIAL HYPERTENSION: ICD-10-CM

## 2024-02-29 DIAGNOSIS — G25.81 RESTLESS LEG SYNDROME: ICD-10-CM

## 2024-02-29 DIAGNOSIS — K21.9 GASTROESOPHAGEAL REFLUX DISEASE WITHOUT ESOPHAGITIS: ICD-10-CM

## 2024-02-29 DIAGNOSIS — E78.2 MIXED HYPERLIPIDEMIA: ICD-10-CM

## 2024-02-29 RX ORDER — ATORVASTATIN CALCIUM 40 MG/1
40 TABLET, FILM COATED ORAL DAILY
Qty: 30 TABLET | Refills: 0 | Status: SHIPPED | OUTPATIENT
Start: 2024-02-29

## 2024-02-29 RX ORDER — AMLODIPINE BESYLATE 5 MG/1
5 TABLET ORAL DAILY
Qty: 30 TABLET | Refills: 0 | Status: SHIPPED | OUTPATIENT
Start: 2024-02-29

## 2024-02-29 RX ORDER — ROPINIROLE 0.5 MG/1
0.5 TABLET, FILM COATED ORAL NIGHTLY
Qty: 30 TABLET | Refills: 0 | Status: SHIPPED | OUTPATIENT
Start: 2024-02-29 | End: 2024-05-09 | Stop reason: SDUPTHER

## 2024-02-29 RX ORDER — PANTOPRAZOLE SODIUM 40 MG/1
40 TABLET, DELAYED RELEASE ORAL DAILY
Qty: 30 TABLET | Refills: 0 | Status: SHIPPED | OUTPATIENT
Start: 2024-02-29 | End: 2024-05-09 | Stop reason: SDUPTHER

## 2024-02-29 NOTE — TELEPHONE ENCOUNTER
Rx Refill Request     Name: Jodi Nuñez  :  1940   Medication Name:  Atorvastatin, amlodipine, ropinirole, pantoprazole   Specific Pharmacy location:  [facility] in [facility]   Date of last appointment:  23  Date of next appointment:  3/19/24  Best number to reach patient:  [phone]

## 2024-03-03 ENCOUNTER — NURSING HOME VISIT (OUTPATIENT)
Dept: POST ACUTE CARE | Facility: EXTERNAL LOCATION | Age: 84
End: 2024-03-03
Payer: MEDICARE

## 2024-03-03 DIAGNOSIS — I25.10 CORONARY ARTERY DISEASE INVOLVING NATIVE CORONARY ARTERY OF NATIVE HEART WITHOUT ANGINA PECTORIS: ICD-10-CM

## 2024-03-03 DIAGNOSIS — S82.001S CLOSED NONDISPLACED FRACTURE OF RIGHT PATELLA, UNSPECIFIED FRACTURE MORPHOLOGY, SEQUELA: Primary | ICD-10-CM

## 2024-03-03 DIAGNOSIS — I10 ESSENTIAL HYPERTENSION: ICD-10-CM

## 2024-03-03 DIAGNOSIS — F41.1 GENERALIZED ANXIETY DISORDER: ICD-10-CM

## 2024-03-03 DIAGNOSIS — G25.81 RESTLESS LEG SYNDROME: ICD-10-CM

## 2024-03-03 DIAGNOSIS — F02.B0 MODERATE LATE ONSET ALZHEIMER'S DEMENTIA WITHOUT BEHAVIORAL DISTURBANCE, PSYCHOTIC DISTURBANCE, MOOD DISTURBANCE, OR ANXIETY (MULTI): ICD-10-CM

## 2024-03-03 DIAGNOSIS — N18.31 CHRONIC KIDNEY DISEASE, STAGE 3A (MULTI): ICD-10-CM

## 2024-03-03 DIAGNOSIS — G30.1 MODERATE LATE ONSET ALZHEIMER'S DEMENTIA WITHOUT BEHAVIORAL DISTURBANCE, PSYCHOTIC DISTURBANCE, MOOD DISTURBANCE, OR ANXIETY (MULTI): ICD-10-CM

## 2024-03-03 PROCEDURE — 99308 SBSQ NF CARE LOW MDM 20: CPT | Performed by: INTERNAL MEDICINE

## 2024-03-03 NOTE — LETTER
Patient: Jodi Nuñez  : 1940    Encounter Date: 2024    Subjective  Patient ID: Jodi Nuñez is a 83 y.o. female who is acute skilled care being seen and evaluated for multiple medical problems.    Patient is somewhat better, short follow-up was made, laboratories were reviewed by me, messages were reviewed and taken by me.  Creatinine is 1.57.  She has a fracture of patella nonsurgically treated.  Patient remains on sertraline, Lovenox, Xanax, amlodipine, buspirone, carvedilol.  She has been on a weightbearing as tolerated, pain control is reasonable.  No fever or chills, no confusion, no nosocomial infection, patient has history of cognitive impairment, patient has history of anxiety disorder.  She remains calm and comfortable apprehensiveness has been reduced, nursing staff did not have any questions or concerns.         Review of Systems   Constitutional: Negative.  Negative for activity change and fatigue.   Respiratory:  Negative for apnea, cough and shortness of breath.    Cardiovascular:  Negative for chest pain and palpitations.   Gastrointestinal:  Negative for abdominal distention and nausea.   Genitourinary:  Negative for difficulty urinating.   Musculoskeletal:  Positive for arthralgias and gait problem. Negative for joint swelling.   Neurological:  Negative for weakness.   Psychiatric/Behavioral:  Negative for agitation, behavioral problems and confusion.        Objective  /59   Pulse 98   Wt 58.5 kg (129 lb)   BMI 24.39 kg/m²     Physical Exam  Vitals reviewed.   Constitutional:       Appearance: Normal appearance. She is normal weight.   HENT:      Head: Normocephalic.   Eyes:      Conjunctiva/sclera: Conjunctivae normal.   Cardiovascular:      Rate and Rhythm: Normal rate and regular rhythm.      Pulses: Normal pulses.   Pulmonary:      Breath sounds: Normal breath sounds. No rales.   Abdominal:      Palpations: Abdomen is soft.   Musculoskeletal:         General: Tenderness  and signs of injury present. No swelling or deformity.      Cervical back: Neck supple.   Skin:     General: Skin is warm and dry.   Neurological:      General: No focal deficit present.   Psychiatric:         Mood and Affect: Mood normal.         Assessment/Plan  Problem List Items Addressed This Visit             ICD-10-CM    Chronic kidney disease, stage 3a (CMS/[facility]) N18.31    Coronary artery disease involving native coronary artery of native heart without angina pectoris I25.10    Essential hypertension I10    Generalized anxiety disorder F41.1    Restless leg syndrome G25.81    Moderate late onset Alzheimer's dementia without behavioral disturbance, psychotic disturbance, mood disturbance, or anxiety (CMS/[facility]) G30.1, F02.B0     Other Visit Diagnoses         Codes    Closed nondisplaced fracture of right patella, unspecified fracture morphology, sequela    -  Primary S82.001S        Patient is doing well, fracture is going to heal, it is not displaced fracture, cognitive impairment is a chronic process, no significant deterioration or any progressive confusion or lethargic noticed.  No angina, hemodynamics are reviewed, ropinirole therapy has been continued for RLS, creatinine will be monitored on a weekly basis.  Patient is on chronic buspirone treatment for generalized anxiety disorder.  Amlodipine carvedilol to be continued, patient's progress has been appropriate, graduated range of motion activities and mobility is expected.  Patient is expected to be discharged in a domiciliary setting.  Precautions has to be taken so that patient does not care for follow-up patient does not get any traumatic injuries and there is no evidence of any sort of viral infection as we have a viral outbreak in the facility.  Nursing staff did not have any questions or concerns, periodic follow-up and assessment will be done and messages and concerns will be notified to me.     Goals    None           Electronically Signed By:  Abilio Davis MD   3/5/24  9:23 PM

## 2024-03-05 ENCOUNTER — NURSING HOME VISIT (OUTPATIENT)
Dept: POST ACUTE CARE | Facility: EXTERNAL LOCATION | Age: 84
End: 2024-03-05
Payer: MEDICARE

## 2024-03-05 VITALS
TEMPERATURE: 97.5 F | RESPIRATION RATE: 18 BRPM | HEART RATE: 75 BPM | DIASTOLIC BLOOD PRESSURE: 74 MMHG | OXYGEN SATURATION: 97 % | HEIGHT: 61 IN | WEIGHT: 129 LBS | BODY MASS INDEX: 24.35 KG/M2 | SYSTOLIC BLOOD PRESSURE: 128 MMHG

## 2024-03-05 VITALS
HEART RATE: 98 BPM | BODY MASS INDEX: 24.39 KG/M2 | DIASTOLIC BLOOD PRESSURE: 59 MMHG | SYSTOLIC BLOOD PRESSURE: 101 MMHG | WEIGHT: 129 LBS

## 2024-03-05 DIAGNOSIS — H40.9 GLAUCOMA OF BOTH EYES, UNSPECIFIED GLAUCOMA TYPE: ICD-10-CM

## 2024-03-05 DIAGNOSIS — S82.009A CLOSED FRACTURE OF PATELLA, INITIAL ENCOUNTER: ICD-10-CM
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
## 2024-03-05 DIAGNOSIS — Z74.09 IMPAIRED FUNCTIONAL MOBILITY, BALANCE, AND ENDURANCE: ICD-10-CM

## 2024-03-05 DIAGNOSIS — Z51.81 VISIT FOR MONITORING LOVENOX THERAPY: ICD-10-CM

## 2024-03-05 DIAGNOSIS — Z79.01 VISIT FOR MONITORING LOVENOX THERAPY: ICD-10-CM

## 2024-03-05 DIAGNOSIS — F17.200 CURRENT EVERY DAY SMOKER: ICD-10-CM

## 2024-03-05 PROCEDURE — 99310 SBSQ NF CARE HIGH MDM 45: CPT | Performed by: PHYSICIAN ASSISTANT

## 2024-03-05 ASSESSMENT — ENCOUNTER SYMPTOMS
CONFUSION: 0
PALPITATIONS: 0
AGITATION: 0
NAUSEA: 0
ARTHRALGIAS: 1
ACTIVITY CHANGE: 0
ABDOMINAL DISTENTION: 0
CONSTITUTIONAL NEGATIVE: 1
JOINT SWELLING: 0
DIFFICULTY URINATING: 0
APNEA: 0
SHORTNESS OF BREATH: 0
FATIGUE: 0
COUGH: 0
WEAKNESS: 0

## 2024-03-05 NOTE — LETTER
"Patient: Jodi Nuñez  : 1940    Encounter Date: 2024    3/5/2024  Name: Jodi Nuñez  YOB: 1940    Chief complaint: Fall at home. R patellar fracture.    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 Patient was brought to ER from home for evaluation of fall from her bed. Patient states she rolled off the edge of her bed landing on her right knee. She denied striking her head or experiencing LOC. CT scan of R knee showed nondisplaced right patella fracture. No surgical intervention at this time. Patient was discharged to SNF with R knee immobilizer for rehab.    Fall  The accident occurred More than 1 week ago. The fall occurred from a bed. She fell from a height of 1 to 2 ft. She landed on Hard floor. The point of impact was the right knee. The pain is present in the right knee. The patient is experiencing no pain. The symptoms are aggravated by standing. Pertinent negatives include no abdominal pain, bowel incontinence, fever, hematuria, loss of consciousness, numbness or tingling. She has tried acetaminophen and rest for the symptoms. The treatment provided moderate relief.     Review of systems:   ROS negative except were noted in HPI.    Code Status: DNR-CC    /74   Pulse 75   Temp 36.4 °C (97.5 °F)   Resp 18   Ht 1.549 m (5' 1\")   Wt 58.5 kg (129 lb)   SpO2 97%   BMI 24.37 kg/m²      Physical Exam  Constitutional:       General: She is not in acute distress.  HENT:      Head: Normocephalic.      Nose: Nose normal.      Mouth/Throat:      Mouth: Mucous membranes are moist.   Eyes:      Extraocular Movements: Extraocular movements intact.      Pupils: Pupils are equal, round, and reactive to light.   Cardiovascular:      Rate and Rhythm: Normal rate and " regular rhythm.      Pulses: Normal pulses.   Pulmonary:      Effort: Pulmonary effort is normal.      Breath sounds: Normal breath sounds.   Abdominal:      General: Bowel sounds are normal.      Palpations: Abdomen is soft.      Tenderness: There is no abdominal tenderness. There is no guarding.   Genitourinary:     Comments: Voiding   Musculoskeletal:         General: Tenderness present.      Cervical back: Normal range of motion.      Comments: R knee immobilizer on.   Lymphadenopathy:      Cervical: No cervical adenopathy.   Skin:     General: Skin is warm and dry.      Capillary Refill: Capillary refill takes less than 2 seconds.   Neurological:      Mental Status: She is alert. Mental status is at baseline.   Psychiatric:         Mood and Affect: Mood normal.         Behavior: Behavior normal.        Medications reviewed during visit at facility.  Pantoprazole 40 mg po daily  Ropinirole 0.5 mg po daily  Aspirin 81 mg po daily  Losartan 25 mg po daily  Coreg 12.5 mg po bid  Memantine 10 mg po daily  Vitamin D 3 1.25 mg one capsule q week  Donepezil 10 mg po daily  Lumigan Ophthalmic Solution 0.01 % one drop both eyes daily  Nicotine 14 mg patch q 24 hours  Atorvastatin 40 mg po daily  Sertraline 100 mg po daily  Amlodipine 5 mg po daily  Buspirone 10 mg po tid  Tylenol 650 mg po q 4 hours prn  MOM 30 ml po daily prn  Lovenox 30 mg subcutaneous daily  Melatonin 3 mg po q hs    Labs reviewed at facility:     Laboratory Service Report [id number]   Patient Name   THERON BERGER  Patient ID   [id number]  Age   83 Y  Gender   F  Order #      Ordering CINDY Kapoor  Patient Telephone #   N/A     1940  AKA      Client Order #   [id number]   Collection Date and Time   2024 06:05   Print Date and Time   2024 21:21  Account Information   [facility] NR   [id number] [place] [address]     Report Notes                 Test Results   Reference Perform  Unit  Value  Site*             CBC and Differential  REPORTED 03/01/2024 08:30  White Blood Cell Count   9.45 k/uL 3.70-11.00    RBC   3.92 m/uL 3.90-5.20    Hemoglobin   11.8 g/dL 11.5-15.5    Hematocrit   36.5 % 36.0-46.0    MCV   93.1 fL 80.0-100.0    MCH   30.1 pg 26.0-34.0    MCHC   32.3 g/dL 30.5-36.0    RDW-CV H 16.4 % 11.5-15.0    Platelet Count   220 k/uL 150-400    MPV   12.1 fL 9.0-12.7    Neut%   73.0 %    Abs Neut   6.90 k/uL 1.45-7.50    Lymph%   13.0 %    Abs Lymph   1.23 k/uL 1.00-4.00    Mono%   10.5 %    Abs Mono H 0.99 k/uL <0.87    Eosin%   1.8 %    Abs Eosin   0.17 k/uL <0.46    Baso%   1.1 %    Abs Baso   0.10 k/uL <0.11    Immature Gran %%   0.6 %    Abs Immature Gran   0.06 k/uL <0.10    NRBC   0.0 /100 WBC    Absolute nRBC   <0.01 k/uL <0.01    Diff Type   Auto               Comp Metabolic Panel  REPORTED 03/01/2024 10:29  Protein, Total   6.9 g/dL 6.3-8.0    Albumin   4.1 g/dL 3.9-4.9    Calcium, Total   9.4 mg/dL 8.5-10.2    Bilirubin, Total   0.3 mg/dL 0.2-1.3    Alkaline Phosphatase   74 U/L     AST   16 U/L 13-35    ALT   15 U/L 7-38    Glucose H 116 mg/dL 74-99  BUN H 35 mg/dL 7-21    Creatinine H 1.57 mg/dL 0.58-0.96    Sodium   142 mmol/L 136-144    Potassium H 5.2 mmol/L 3.7-5.1    Chloride H 109 mmol/L     CO2   23 mmol/L 22-30    Anion Gap   10 mmol/L 9-18    Estimated Glomerular Filtration Rate L 33 mL/min/1.73 meters squared >=60     Assessment/Plan   Problem List Items Addressed This Visit       Current every day smoker     Discuss smoking cessation. Nicotine 14 mg patch q 24 hours         Glaucoma of both eyes     Lumigan Ophthalmic Solution 0.01 % one drop both eyes daily.          Closed fracture of patella, initial encounter     Schedule appointment with Dr. Jackie CLEMENTE on 3-15-24. WBAT to RLE.         Fall - Primary     Assist with all transfers. Wear R knee immobilizer when out of bed.         Impaired functional mobility, balance, and endurance     PT and OT to assess  and treat. Review physical and occupational therapy notes.            Time:  I spent 45 minutes or greater with the patient. Greater than 50% of this time was spent in counseling and or coordination of care. The time includes prep time of reviewing vital signs, report from direct nursing staff and or therapists, hospital documentation, reviewing labs, radiographs, diagnostic tests and or consultations, time directly spent with the patient interviewing, examining, and education regarding diagnosis, treatments, and medications, as well as documentation in the electronic medical record, and reviewing the plan of care and any new orders with the patient, nursing staff and other staff directly related to the patients care.      Hakeem Lloyd PA-C       Electronically Signed By: Hakeem Lloyd PA-C   3/6/24 10:13 PM

## 2024-03-06 ENCOUNTER — NURSING HOME VISIT (OUTPATIENT)
Dept: POST ACUTE CARE | Facility: EXTERNAL LOCATION | Age: 84
End: 2024-03-06
Payer: MEDICARE

## 2024-03-06 VITALS
HEART RATE: 78 BPM | BODY MASS INDEX: 24.37 KG/M2 | SYSTOLIC BLOOD PRESSURE: 140 MMHG | WEIGHT: 129 LBS | DIASTOLIC BLOOD PRESSURE: 78 MMHG

## 2024-03-06 DIAGNOSIS — S82.001S CLOSED NONDISPLACED FRACTURE OF RIGHT PATELLA, UNSPECIFIED FRACTURE MORPHOLOGY, SEQUELA: Primary | ICD-10-CM

## 2024-03-06 DIAGNOSIS — I25.10 CORONARY ARTERY DISEASE INVOLVING NATIVE CORONARY ARTERY OF NATIVE HEART WITHOUT ANGINA PECTORIS: ICD-10-CM

## 2024-03-06 DIAGNOSIS — F41.1 GENERALIZED ANXIETY DISORDER: ICD-10-CM

## 2024-03-06 DIAGNOSIS — F02.B0 MODERATE LATE ONSET ALZHEIMER'S DEMENTIA WITHOUT BEHAVIORAL DISTURBANCE, PSYCHOTIC DISTURBANCE, MOOD DISTURBANCE, OR ANXIETY (MULTI): ICD-10-CM

## 2024-03-06 DIAGNOSIS — G30.1 MODERATE LATE ONSET ALZHEIMER'S DEMENTIA WITHOUT BEHAVIORAL DISTURBANCE, PSYCHOTIC DISTURBANCE, MOOD DISTURBANCE, OR ANXIETY (MULTI): ICD-10-CM

## 2024-03-06 DIAGNOSIS — I10 ESSENTIAL HYPERTENSION: ICD-10-CM

## 2024-03-06 DIAGNOSIS — G25.81 RESTLESS LEG SYNDROME: ICD-10-CM

## 2024-03-06 DIAGNOSIS — C65.1 MALIGNANT NEOPLASM OF RIGHT RENAL PELVIS (MULTI): ICD-10-CM

## 2024-03-06 PROBLEM — Z74.09 IMPAIRED FUNCTIONAL MOBILITY, BALANCE, AND ENDURANCE: Status: ACTIVE | Noted: 2024-03-06

## 2024-03-06 PROBLEM — Z79.01 VISIT FOR MONITORING LOVENOX THERAPY: Status: ACTIVE | Noted: 2024-03-06
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
## 2024-03-06 PROBLEM — Z51.81 VISIT FOR MONITORING LOVENOX THERAPY: Status: ACTIVE | Noted: 2024-03-06

## 2024-03-06 PROCEDURE — 99308 SBSQ NF CARE LOW MDM 20: CPT | Performed by: INTERNAL MEDICINE

## 2024-03-06 ASSESSMENT — ENCOUNTER SYMPTOMS
ABDOMINAL PAIN: 0
HEMATURIA: 0
COUGH: 0
CARDIOVASCULAR NEGATIVE: 1
FATIGUE: 0
DIZZINESS: 0
TINGLING: 0
ARTHRALGIAS: 1
SHORTNESS OF BREATH: 0
LOSS OF CONSCIOUSNESS: 0
NUMBNESS: 0
BOWEL INCONTINENCE: 0
FEVER: 0
DIARRHEA: 0
ABDOMINAL DISTENTION: 0
AGITATION: 0
WEAKNESS: 0
ACTIVITY CHANGE: 0
APNEA: 0

## 2024-03-06 NOTE — PROGRESS NOTES
"3/5/2024  Name: Jodi Nuñez  YOB: 1940    Chief complaint: Fall at home. R patellar fracture.    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 Patient was brought to ER from home for evaluation of fall from her bed. Patient states she rolled off the edge of her bed landing on her right knee. She denied striking her head or experiencing LOC. CT scan of R knee showed nondisplaced right patella fracture. No surgical intervention at this time. Patient was discharged to SNF with R knee immobilizer for rehab.    Fall  The accident occurred More than 1 week ago. The fall occurred from a bed. She fell from a height of 1 to 2 ft. She landed on Hard floor. The point of impact was the right knee. The pain is present in the right knee. The patient is experiencing no pain. The symptoms are aggravated by standing. Pertinent negatives include no abdominal pain, bowel incontinence, fever, hematuria, loss of consciousness, numbness or tingling. She has tried acetaminophen and rest for the symptoms. The treatment provided moderate relief.     Review of systems:   ROS negative except were noted in HPI.    Code Status: DNR-CC    /74   Pulse 75   Temp 36.4 °C (97.5 °F)   Resp 18   Ht 1.549 m (5' 1\")   Wt 58.5 kg (129 lb)   SpO2 97%   BMI 24.37 kg/m²      Physical Exam  Constitutional:       General: She is not in acute distress.  HENT:      Head: Normocephalic.      Nose: Nose normal.      Mouth/Throat:      Mouth: Mucous membranes are moist.   Eyes:      Extraocular Movements: Extraocular movements intact.      Pupils: Pupils are equal, round, and reactive to light.   Cardiovascular:      Rate and Rhythm: Normal rate and regular rhythm.      Pulses: Normal pulses.   Pulmonary:      Effort: " Pulmonary effort is normal.      Breath sounds: Normal breath sounds.   Abdominal:      General: Bowel sounds are normal.      Palpations: Abdomen is soft.      Tenderness: There is no abdominal tenderness. There is no guarding.   Genitourinary:     Comments: Voiding   Musculoskeletal:         General: Tenderness present.      Cervical back: Normal range of motion.      Comments: R knee immobilizer on.   Lymphadenopathy:      Cervical: No cervical adenopathy.   Skin:     General: Skin is warm and dry.      Capillary Refill: Capillary refill takes less than 2 seconds.   Neurological:      Mental Status: She is alert. Mental status is at baseline.   Psychiatric:         Mood and Affect: Mood normal.         Behavior: Behavior normal.        Medications reviewed during visit at facility.  Pantoprazole 40 mg po daily  Ropinirole 0.5 mg po daily  Aspirin 81 mg po daily  Losartan 25 mg po daily  Coreg 12.5 mg po bid  Memantine 10 mg po daily  Vitamin D 3 1.25 mg one capsule q week  Donepezil 10 mg po daily  Lumigan Ophthalmic Solution 0.01 % one drop both eyes daily  Nicotine 14 mg patch q 24 hours  Atorvastatin 40 mg po daily  Sertraline 100 mg po daily  Amlodipine 5 mg po daily  Buspirone 10 mg po tid  Tylenol 650 mg po q 4 hours prn  MOM 30 ml po daily prn  Lovenox 30 mg subcutaneous daily  Melatonin 3 mg po q hs    Labs reviewed at facility:     Laboratory Service Report [id number]   Patient Name   THERON BERGER  Patient ID   [id number]  Age   83 Y  Gender   F  Order #      Ordering Phys   CINDY OLMEDO  Patient Telephone #   N/A     1940  AKA      Client Order #   [id number]   Collection Date and Time   2024 06:05   Print Date and Time   2024 21:21  Account Information   [facility] NR   [address] [place] [address]     Report Notes                 Test Results   Reference Perform  Unit  Value Site*             CBC and Differential  REPORTED 2024 08:30  White  Blood Cell Count   9.45 k/uL 3.70-11.00    RBC   3.92 m/uL 3.90-5.20    Hemoglobin   11.8 g/dL 11.5-15.5    Hematocrit   36.5 % 36.0-46.0    MCV   93.1 fL 80.0-100.0    MCH   30.1 pg 26.0-34.0    MCHC   32.3 g/dL 30.5-36.0    RDW-CV H 16.4 % 11.5-15.0    Platelet Count   220 k/uL 150-400    MPV   12.1 fL 9.0-12.7    Neut%   73.0 %    Abs Neut   6.90 k/uL 1.45-7.50    Lymph%   13.0 %    Abs Lymph   1.23 k/uL 1.00-4.00    Mono%   10.5 %    Abs Mono H 0.99 k/uL <0.87    Eosin%   1.8 %    Abs Eosin   0.17 k/uL <0.46    Baso%   1.1 %    Abs Baso   0.10 k/uL <0.11    Immature Gran %%   0.6 %    Abs Immature Gran   0.06 k/uL <0.10    NRBC   0.0 /100 WBC    Absolute nRBC   <0.01 k/uL <0.01    Diff Type   Auto               Comp Metabolic Panel  REPORTED 03/01/2024 10:29  Protein, Total   6.9 g/dL 6.3-8.0    Albumin   4.1 g/dL 3.9-4.9    Calcium, Total   9.4 mg/dL 8.5-10.2    Bilirubin, Total   0.3 mg/dL 0.2-1.3    Alkaline Phosphatase   74 U/L     AST   16 U/L 13-35    ALT   15 U/L 7-38    Glucose H 116 mg/dL 74-99  BUN H 35 mg/dL 7-21    Creatinine H 1.57 mg/dL 0.58-0.96    Sodium   142 mmol/L 136-144    Potassium H 5.2 mmol/L 3.7-5.1    Chloride H 109 mmol/L     CO2   23 mmol/L 22-30    Anion Gap   10 mmol/L 9-18    Estimated Glomerular Filtration Rate L 33 mL/min/1.73 meters squared >=60     Assessment/Plan    Problem List Items Addressed This Visit       Current every day smoker     Discuss smoking cessation. Nicotine 14 mg patch q 24 hours         Glaucoma of both eyes     Lumigan Ophthalmic Solution 0.01 % one drop both eyes daily.          Closed fracture of patella, initial encounter     Schedule appointment with Dr. Jackie CLEMETNE on 3-15-24. WBAT to RLE.         Fall - Primary     Assist with all transfers. Wear R knee immobilizer when out of bed.         Impaired functional mobility, balance, and endurance     PT and OT to assess and treat. Review physical and occupational therapy notes.             Time:  I spent 45 minutes or greater with the patient. Greater than 50% of this time was spent in counseling and or coordination of care. The time includes prep time of reviewing vital signs, report from direct nursing staff and or therapists, hospital documentation, reviewing labs, radiographs, diagnostic tests and or consultations, time directly spent with the patient interviewing, examining, and education regarding diagnosis, treatments, and medications, as well as documentation in the electronic medical record, and reviewing the plan of care and any new orders with the patient, nursing staff and other staff directly related to the patients care.      Hakeem Lloyd PA-C

## 2024-03-06 NOTE — PROGRESS NOTES
Subjective   Patient ID: Jodi Nuñez is a 83 y.o. female who is acute skilled care being seen and evaluated for multiple medical problems.    Patient is somewhat better, short follow-up was made, laboratories were reviewed by me, messages were reviewed and taken by me.  Creatinine is 1.57.  She has a fracture of patella nonsurgically treated.  Patient remains on sertraline, Lovenox, Xanax, amlodipine, buspirone, carvedilol.  She has been on a weightbearing as tolerated, pain control is reasonable.  No fever or chills, no confusion, no nosocomial infection, patient has history of cognitive impairment, patient has history of anxiety disorder.  She remains calm and comfortable apprehensiveness has been reduced, nursing staff did not have any questions or concerns.         Review of Systems   Constitutional: Negative.  Negative for activity change and fatigue.   Respiratory:  Negative for apnea, cough and shortness of breath.    Cardiovascular:  Negative for chest pain and palpitations.   Gastrointestinal:  Negative for abdominal distention and nausea.   Genitourinary:  Negative for difficulty urinating.   Musculoskeletal:  Positive for arthralgias and gait problem. Negative for joint swelling.   Neurological:  Negative for weakness.   Psychiatric/Behavioral:  Negative for agitation, behavioral problems and confusion.        Objective   /59   Pulse 98   Wt 58.5 kg (129 lb)   BMI 24.39 kg/m²     Physical Exam  Vitals reviewed.   Constitutional:       Appearance: Normal appearance. She is normal weight.   HENT:      Head: Normocephalic.   Eyes:      Conjunctiva/sclera: Conjunctivae normal.   Cardiovascular:      Rate and Rhythm: Normal rate and regular rhythm.      Pulses: Normal pulses.   Pulmonary:      Breath sounds: Normal breath sounds. No rales.   Abdominal:      Palpations: Abdomen is soft.   Musculoskeletal:         General: Tenderness and signs of injury present. No swelling or deformity.      Cervical  back: Neck supple.   Skin:     General: Skin is warm and dry.   Neurological:      General: No focal deficit present.   Psychiatric:         Mood and Affect: Mood normal.         Assessment/Plan   Problem List Items Addressed This Visit             ICD-10-CM    Chronic kidney disease, stage 3a (CMS/HCC) N18.31    Coronary artery disease involving native coronary artery of native heart without angina pectoris I25.10    Essential hypertension I10    Generalized anxiety disorder F41.1    Restless leg syndrome G25.81    Moderate late onset Alzheimer's dementia without behavioral disturbance, psychotic disturbance, mood disturbance, or anxiety (CMS/[facility]) G30.1, F02.B0     Other Visit Diagnoses         Codes    Closed nondisplaced fracture of right patella, unspecified fracture morphology, sequela    -  Primary S82.001S        Patient is doing well, fracture is going to heal, it is not displaced fracture, cognitive impairment is a chronic process, no significant deterioration or any progressive confusion or lethargic noticed.  No angina, hemodynamics are reviewed, ropinirole therapy has been continued for RLS, creatinine will be monitored on a weekly basis.  Patient is on chronic buspirone treatment for generalized anxiety disorder.  Amlodipine carvedilol to be continued, patient's progress has been appropriate, graduated range of motion activities and mobility is expected.  Patient is expected to be discharged in a domiciliary setting.  Precautions has to be taken so that patient does not care for follow-up patient does not get any traumatic injuries and there is no evidence of any sort of viral infection as we have a viral outbreak in the facility.  Nursing staff did not have any questions or concerns, periodic follow-up and assessment will be done and messages and concerns will be notified to me.     Goals    None

## 2024-03-06 NOTE — LETTER
Patient: Jodi Nuñez  : 1940    Encounter Date: 2024    Subjective  Patient ID: Jodi Nuñez is a 83 y.o. female who is acute skilled care being seen and evaluated for multiple medical problems.    Being a skilled patient patient was seen by me and once again during my routine rounds.  Interim concerns and reviews were done.  Today she was sitting at the edge of the bed, immobilizer remains, anxiety has been much better, weight has not changed significantly, appetite has been fair, remains on buspirone, Xanax, losartan, Namenda, sertraline.  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  Laboratories were reviewed, discussed with  about discharge disposition and still there is no plan yet.  Pain control is reasonable, range of motion activities admitted, orthopedic follow-up will be coming up.         Review of Systems   Constitutional:  Negative for activity change and fatigue.   Respiratory:  Negative for apnea, cough and shortness of breath.    Cardiovascular: Negative.  Negative for leg swelling.   Gastrointestinal:  Negative for abdominal distention and diarrhea.   Musculoskeletal:  Positive for arthralgias.   Neurological:  Negative for dizziness and weakness.   Psychiatric/Behavioral:  Negative for agitation.        Objective  /78   Pulse 78   Wt 58.5 kg (129 lb)   BMI 24.37 kg/m²     Physical Exam  Constitutional:       Appearance: Normal appearance. She is normal weight.   HENT:      Head: Normocephalic.   Eyes:      Conjunctiva/sclera: Conjunctivae normal.   Cardiovascular:      Rate and Rhythm: Normal rate and regular rhythm.      Pulses: Normal pulses.   Pulmonary:      Breath sounds: Normal breath sounds. No rales.   Abdominal:      Palpations: Abdomen is soft.   Musculoskeletal:         General: Tenderness and signs of injury present. No swelling or deformity.      Cervical back: Neck supple.   Skin:     General: Skin is warm and  dry.   Neurological:      General: No focal deficit present.   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    Restless leg syndrome G25.81    Moderate late onset Alzheimer's dementia without behavioral disturbance, psychotic disturbance, mood disturbance, or anxiety (CMS/[facility]) G30.1, F02.B0     Other Visit Diagnoses         Codes    Closed nondisplaced fracture of right patella, unspecified fracture morphology, sequela    -  Primary S82.001S        83-year-old female patient overall healthy is not in a decent shape, heavy smoker in the past, traumatic injury, fracture, nonsurgical patellar fracture, nondisplaced patellar fracture, cognitive functions are not out of ordinary here at the facility.  No angina, anxiety has been controlled, sleep is appropriate, hemodynamics are reviewed, no hematuria, she has a unilateral nephrectomy, ropinirole has been helpful with the symptoms of RLS.  Skilled nursing related care and follow-up to be done, nursing staff did not have any questions or concerns, messages has been reviewed overnight.  It telephonic communication.  Appropriate evaluation and follow-up will be done because of being a skilled nursing patient here at the facility with the control outbreak of influenza and RSV recently at the facility.  Patient remains asymptomatic.     Goals    None           Electronically Signed By: Abilio Davis MD   3/6/24  8:22 PM

## 2024-03-07 NOTE — PROGRESS NOTES
Subjective   Patient ID: Jodi Nuñez is a 83 y.o. female who is acute skilled care being seen and evaluated for multiple medical problems.    Being a skilled patient patient was seen by me and once again during my routine rounds.  Interim concerns and reviews were done.  Today she was sitting at the edge of the bed, immobilizer remains, anxiety has been much better, weight has not changed significantly, appetite has been fair, remains on buspirone, Xanax, losartan, Namenda, sertraline.  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  Laboratories were reviewed, discussed with  about discharge disposition and still there is no plan yet.  Pain control is reasonable, range of motion activities admitted, orthopedic follow-up will be coming up.         Review of Systems   Constitutional:  Negative for activity change and fatigue.   Respiratory:  Negative for apnea, cough and shortness of breath.    Cardiovascular: Negative.  Negative for leg swelling.   Gastrointestinal:  Negative for abdominal distention and diarrhea.   Musculoskeletal:  Positive for arthralgias.   Neurological:  Negative for dizziness and weakness.   Psychiatric/Behavioral:  Negative for agitation.        Objective   /78   Pulse 78   Wt 58.5 kg (129 lb)   BMI 24.37 kg/m²     Physical Exam  Constitutional:       Appearance: Normal appearance. She is normal weight.   HENT:      Head: Normocephalic.   Eyes:      Conjunctiva/sclera: Conjunctivae normal.   Cardiovascular:      Rate and Rhythm: Normal rate and regular rhythm.      Pulses: Normal pulses.   Pulmonary:      Breath sounds: Normal breath sounds. No rales.   Abdominal:      Palpations: Abdomen is soft.   Musculoskeletal:         General: Tenderness and signs of injury present. No swelling or deformity.      Cervical back: Neck supple.   Skin:     General: Skin is warm and dry.   Neurological:      General: No focal deficit present.    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.1    Restless leg syndrome G25.81    Moderate late onset Alzheimer's dementia without behavioral disturbance, psychotic disturbance, mood disturbance, or anxiety (CMS/[facility]) G30.1, F02.B0     Other Visit Diagnoses         Codes    Closed nondisplaced fracture of right patella, unspecified fracture morphology, sequela    -  Primary S82.001S        83-year-old female patient overall healthy is not in a decent shape, heavy smoker in the past, traumatic injury, fracture, nonsurgical patellar fracture, nondisplaced patellar fracture, cognitive functions are not out of ordinary here at the facility.  No angina, anxiety has been controlled, sleep is appropriate, hemodynamics are reviewed, no hematuria, she has a unilateral nephrectomy, ropinirole has been helpful with the symptoms of RLS.  Skilled nursing related care and follow-up to be done, nursing staff did not have any questions or concerns, messages has been reviewed overnight.  It telephonic communication.  Appropriate evaluation and follow-up will be done because of being a skilled nursing patient here at the facility with the control outbreak of influenza and RSV recently at the facility.  Patient remains asymptomatic.     Goals    None

## 2024-03-08 ENCOUNTER — TELEPHONE (OUTPATIENT)
Dept: PRIMARY CARE | Facility: CLINIC | Age: 84
End: 2024-03-08
Payer: MEDICARE

## 2024-03-08 NOTE — TELEPHONE ENCOUNTER
Jodi from [facility] called in regarding the patient. Sbe is requesting orders for skilled nursing, PT, and OT  Please Advise

## 2024-03-11 ENCOUNTER — DOCUMENTATION (OUTPATIENT)
Dept: PRIMARY CARE | Facility: CLINIC | Age: 84
End: 2024-03-11
Payer: MEDICARE

## 2024-03-12 ENCOUNTER — PATIENT OUTREACH (OUTPATIENT)
Dept: PRIMARY CARE | Facility: CLINIC | Age: 84
End: 2024-03-12
Payer: MEDICARE

## 2024-03-12 ENCOUNTER — TELEPHONE (OUTPATIENT)
Dept: PRIMARY CARE | Facility: CLINIC | Age: 84
End: 2024-03-12

## 2024-03-12 DIAGNOSIS — F41.1 GENERALIZED ANXIETY DISORDER: Primary | ICD-10-CM

## 2024-03-12 RX ORDER — ALPRAZOLAM 0.5 MG/1
0.5 TABLET ORAL 3 TIMES DAILY PRN
Qty: 90 TABLET | Refills: 0 | Status: SHIPPED | OUTPATIENT
Start: 2024-03-12 | End: 2024-03-21 | Stop reason: SDUPTHER

## 2024-03-12 NOTE — TELEPHONE ENCOUNTER
Allie from [facility] called in regarding the pateint. She stated she has opened the patient to home health care services and will be seeing the patient once per week for 3 weeks. She stated therapy will be seeing the patient soon for evaluation. Allie is also wanting to report a possible medication interaction. She stated the patients sertraline and buspirone could have a possible interaction and cause serotonin syndrome  Please Advise

## 2024-03-12 NOTE — PROGRESS NOTES
Discharge Facility: [facility]  Discharge Diagnosis: Closed fracture of patella  Admission Date: 02/27/2024  Discharge Date: 03/11/2024    PCP Appointment Date: 03/19/2024  Specialist Appointment Date: Ortho Surg 03/15/2024, Neuro 08/14/2024  Hospital Encounter and Summary: Not available    See discharge assessment below for further details    Engagement  Call Start Time: 1300 (3/12/2024  1:13 PM)    Medications  Medications reviewed with patient/caregiver?: Yes (3/12/2024  1:13 PM)  Is the patient having any side effects they believe may be caused by any medication additions or changes?: No (3/12/2024  1:13 PM)  Does the patient have all medications ordered at discharge?: Yes (3/12/2024  1:13 PM)  Prescription Comments: Script given at discharge for Nicoderm (3/12/2024  1:13 PM)  Is the patient taking all medications as directed (includes completed medication regime)?: Yes (3/12/2024  1:13 PM)  Medication Comments: Patients daughter Arminda denies any issues affording medication (3/12/2024  1:13 PM)    Appointments  Does the patient have a primary care provider?: Yes (3/12/2024  1:13 PM)  Care Management Interventions: Verified appointment date/time/provider (3/12/2024  1:13 PM)  Has the patient kept scheduled appointments due by today?: Yes (3/12/2024  1:13 PM)    Self Management  What is the home health agency?: [facility]  (3/12/2024  1:13 PM)  Has home health visited the patient within 72 hours of discharge?: Yes (3/12/2024  1:13 PM)  What Durable Medical Equipment (DME) was ordered?: N/A (3/12/2024  1:13 PM)    Patient Teaching  Does the patient have access to their discharge instructions?: Yes (3/12/2024  1:13 PM)  Care Management Interventions: Reviewed instructions with patient (3/12/2024  1:13 PM)  What is the patient's perception of their health status since discharge?: Improving (3/12/2024  1:13 PM)  Is the patient/caregiver able to teach back the hierarchy of who to call/visit for symptoms/problems?  PCP, Specialist, Home Health nurse, Urgent Care, ED, 911: Yes (3/12/2024  1:13 PM)  Patient/Caregiver Education Comments: PIERRE spoke to patients daughter Arminda via phone. She states that her mother is doing well at home. They have all needed medication. She has a PCP appointment on 03/19/2024. Jasmin NARANJO is currenlty at the patients home for their inital visit. They have no questions at this time and were thankful for this call. (3/12/2024  1:13 PM)

## 2024-03-15 ENCOUNTER — APPOINTMENT (OUTPATIENT)
Dept: ORTHOPEDIC SURGERY | Facility: CLINIC | Age: 84
End: 2024-03-15
Payer: MEDICARE

## 2024-03-19 ENCOUNTER — TELEMEDICINE (OUTPATIENT)
Dept: PRIMARY CARE | Facility: CLINIC | Age: 84
End: 2024-03-19
Payer: MEDICARE

## 2024-03-19 DIAGNOSIS — G30.1 MODERATE LATE ONSET ALZHEIMER'S DEMENTIA WITHOUT BEHAVIORAL DISTURBANCE, PSYCHOTIC DISTURBANCE, MOOD DISTURBANCE, OR ANXIETY (MULTI): Primary | ICD-10-CM

## 2024-03-19 DIAGNOSIS — N18.4 CHRONIC RENAL DISEASE, STAGE IV (MULTI): ICD-10-CM

## 2024-03-19 DIAGNOSIS — F17.200 CURRENT EVERY DAY SMOKER: ICD-10-CM

## 2024-03-19 DIAGNOSIS — I25.10 CORONARY ARTERY DISEASE INVOLVING NATIVE CORONARY ARTERY OF NATIVE HEART WITHOUT ANGINA PECTORIS: ICD-10-CM

## 2024-03-19 DIAGNOSIS — G30.0 MODERATE EARLY ONSET ALZHEIMER'S DEMENTIA WITH ANXIETY (MULTI): ICD-10-CM

## 2024-03-19 DIAGNOSIS — F41.1 GENERALIZED ANXIETY DISORDER: ICD-10-CM

## 2024-03-19 DIAGNOSIS — E78.2 MIXED HYPERLIPIDEMIA: ICD-10-CM

## 2024-03-19 DIAGNOSIS — F02.B4 MODERATE EARLY ONSET ALZHEIMER'S DEMENTIA WITH ANXIETY (MULTI): ICD-10-CM

## 2024-03-19 DIAGNOSIS — F02.B0 MODERATE LATE ONSET ALZHEIMER'S DEMENTIA WITHOUT BEHAVIORAL DISTURBANCE, PSYCHOTIC DISTURBANCE, MOOD DISTURBANCE, OR ANXIETY (MULTI): Primary | ICD-10-CM

## 2024-03-19 DIAGNOSIS — I48.0 PAROXYSMAL ATRIAL FIBRILLATION (MULTI): ICD-10-CM

## 2024-03-19 PROCEDURE — 99443 PR PHYS/QHP TELEPHONE EVALUATION 21-30 MIN: CPT | Performed by: FAMILY MEDICINE

## 2024-03-19 PROCEDURE — 1159F MED LIST DOCD IN RCRD: CPT | Performed by: FAMILY MEDICINE

## 2024-03-19 ASSESSMENT — ENCOUNTER SYMPTOMS
HEADACHES: 0
VOMITING: 0
BACK PAIN: 0
NECK PAIN: 0
ARTHRALGIAS: 0
NERVOUS/ANXIOUS: 0
ADENOPATHY: 0
NUMBNESS: 0
COUGH: 0
DYSPHORIC MOOD: 0
DIFFICULTY URINATING: 0
BLOOD IN STOOL: 0
WEAKNESS: 0
BRUISES/BLEEDS EASILY: 0
SORE THROAT: 0
CONSTIPATION: 0
ABDOMINAL PAIN: 0
HEMATURIA: 0
EYE DISCHARGE: 0
DIARRHEA: 0
FATIGUE: 0
CHEST TIGHTNESS: 0
DIZZINESS: 0
NAUSEA: 0
SHORTNESS OF BREATH: 0
ACTIVITY CHANGE: 0
MYALGIAS: 0

## 2024-03-19 NOTE — PROGRESS NOTES
Subjective   Patient ID: Jodi Nuñez is a 83 y.o. female who presents for Follow-up.  HPI  Patient with telephone visit  Unable to accomplish virtual visit    Alzheimer stable no progression or worsening    Coronary disease  and atrial fibrillation stable keep cardiac follow-up    High cholesterol watch diet    Anxiety stable medicine helpful  Stay current with urine drug screen and CSA fracture she is healing    S/p r patella  Review of Systems   Constitutional:  Negative for activity change and fatigue.   HENT:  Negative for congestion and sore throat.    Eyes:  Negative for discharge.   Respiratory:  Negative for cough, chest tightness and shortness of breath.    Cardiovascular:  Negative for chest pain and leg swelling.   Gastrointestinal:  Negative for abdominal pain, blood in stool, constipation, diarrhea, nausea and vomiting.   Endocrine: Negative for cold intolerance and heat intolerance.   Genitourinary:  Negative for difficulty urinating and hematuria.   Musculoskeletal:  Negative for arthralgias, back pain, gait problem, myalgias and neck pain.   Allergic/Immunologic: Negative for environmental allergies.   Neurological:  Negative for dizziness, syncope, weakness, numbness and headaches.   Hematological:  Negative for adenopathy. Does not bruise/bleed easily.   Psychiatric/Behavioral:  Negative for dysphoric mood. The patient is not nervous/anxious.    All other systems reviewed and are negative.      Objective   There were no vitals taken for this visit.   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  Stay current with age appropriate health maintenance as instructed.  Appointment here or ER with new or worsening symptoms'  Keep appropriate follow-up visit.  Stay current with proper immunizations   3 months  Office visit then  Discussed with patient and daughter

## 2024-03-21 ENCOUNTER — PATIENT OUTREACH (OUTPATIENT)
Dept: PRIMARY CARE | Facility: CLINIC | Age: 84
End: 2024-03-21
Payer: MEDICARE

## 2024-03-21 DIAGNOSIS — F41.1 GENERALIZED ANXIETY DISORDER: ICD-10-CM

## 2024-03-21 PROBLEM — N18.4 CHRONIC RENAL DISEASE, STAGE IV (MULTI): Status: ACTIVE | Noted: 2024-03-21

## 2024-03-21 NOTE — TELEPHONE ENCOUNTER
Rx Refill Request     Name: Jodi Nuñez  :  1940   Medication Name:  xanax  Specific Pharmacy location:  [facility] in [facility]   Date of last appointment:  2023  Date of next appointment:  Visit date not found   Best number to reach patient:  [phone]

## 2024-03-21 NOTE — PROGRESS NOTES
Call regarding appt. with PCP on 03/19/2024 after hospitalization.  At time of outreach call the patient feels as if their condition has improved since last visit.  Reviewed the PCP appointment with the pt and addressed any questions or concerns.

## 2024-03-22 RX ORDER — ALPRAZOLAM 0.5 MG/1
0.5 TABLET ORAL 3 TIMES DAILY PRN
Qty: 90 TABLET | Refills: 0 | Status: SHIPPED | OUTPATIENT
Start: 2024-03-22 | End: 2024-05-06 | Stop reason: SDUPTHER

## 2024-03-22 NOTE — TELEPHONE ENCOUNTER
Did evaluation today with daughter and pt. Provided support service resources information for transportation and dementia related services. no follow up indicated. Hui can be reached at [phone]

## 2024-03-27 ENCOUNTER — OFFICE VISIT (OUTPATIENT)
Dept: ORTHOPEDIC SURGERY | Facility: CLINIC | Age: 84
End: 2024-03-27
Payer: MEDICARE

## 2024-03-27 ENCOUNTER — CLINICAL SUPPORT (OUTPATIENT)
Dept: ORTHOPEDIC SURGERY | Facility: CLINIC | Age: 84
End: 2024-03-27
Payer: MEDICARE

## 2024-03-27 DIAGNOSIS — M25.561 ACUTE PAIN OF RIGHT KNEE: ICD-10-CM

## 2024-03-27 PROCEDURE — 73560 X-RAY EXAM OF KNEE 1 OR 2: CPT | Mod: RIGHT SIDE | Performed by: ORTHOPAEDIC SURGERY

## 2024-03-27 PROCEDURE — L1812 KO ELASTIC W/JOINTS PRE OTS: HCPCS | Performed by: ORTHOPAEDIC SURGERY

## 2024-03-27 PROCEDURE — 1159F MED LIST DOCD IN RCRD: CPT | Performed by: ORTHOPAEDIC SURGERY

## 2024-03-27 NOTE — PROGRESS NOTES
History of present illness: Patient with a history of right knee pain    She was seen in the hospital under consultation from Dr. Mejia knee contusion was diagnosed subsequent follow-up CAT scan confirmed a patella fracture but fortunately minimally to nondisplaced involving just the inferior lateral pole of the patella she has been in a knee immobilizer at a nursing home and comes in to evaluate and follow-up    Physical exam:    General: No acute distress or breathing difficulty or discomfort, pleasant and cooperative with the examination.    Extremities: Extensor mechanism is intact on the right knee    She can straight leg raise    She is having no pain    No bruising no swelling no edema    Leg could have Lachman's Dayo exam collateral integrity is good she can bend gently to 90 degrees.    She can bend gently without much discomfort or pain or crepitus    Foot and ankle motion is good        Diagnostic studies: X-rays show a normal right knee no obvious fracture as pointed out on CT scan    Impression: Right patella fracture essentially nondisplaced involving the inferior pole of patella stable though fracture pattern    Plan: Weight-bear as tolerated    Gentle range of motion in a brace    The brace may be off her bath shower skin care and gentle range of motion no formal therapy or aggressive range of motion but she can bend gently to walk and ambulate    I will see her back in 3 weeks with AP lateral right knee x-ray hopefully then we can discontinue bracing    Fall prevention is mandatory walker at all times for any ambulation is recommended    If she does get painful or sore she can put her knee immobilizer back on but hopefully this will allow for skin care and gentle range of motion of her neck several weeks

## 2024-04-04 ENCOUNTER — PROCEDURE VISIT (OUTPATIENT)
Dept: UROLOGY | Facility: CLINIC | Age: 84
End: 2024-04-04
Payer: MEDICARE

## 2024-04-04 VITALS
HEIGHT: 61 IN | DIASTOLIC BLOOD PRESSURE: 69 MMHG | HEART RATE: 78 BPM | SYSTOLIC BLOOD PRESSURE: 115 MMHG | WEIGHT: 139 LBS | BODY MASS INDEX: 26.24 KG/M2

## 2024-04-04 DIAGNOSIS — C65.1 MALIGNANT NEOPLASM OF RIGHT RENAL PELVIS (MULTI): ICD-10-CM

## 2024-04-04 PROCEDURE — 52000 CYSTOURETHROSCOPY: CPT | Performed by: UROLOGY

## 2024-04-04 NOTE — PROGRESS NOTES
Subjective   Patient ID: Jodi Nuñez is a 83 y.o. female who presents for Bladder Cancer. Last seen 8/31/23 when We will see the patient back in 6 months with a CT Urogram and a follow-up cystoscopy.     HPI  The patient is accompanied by her daughter.   Patient had a recent fall and injured her right kneecap. She did not need any surgical repair.     Patient did not get a CT Urogram done. Patient denies hematuria.     Review of Systems  A 12 system review was completed and is negative with the exception of those signs and symptoms noted in the history of present illness.    Objective   Physical Exam  General: in NAD, appears stated age  Head: normocephalic, atraumatic  Respiratory: normal effort, no use of accessory muscles  Cardiovascular: no edema noted  Skin: normal turgor, no rashes  Neurologic: grossly intact, oriented to person/place/time  Psychiatric: mode and affect appropriate     Procedure  Cystoscopy for TCC bladder  ?Patient's genitalia were prepped and draped in usual sterile fashion. A 17 Amharic flexible cystoscope was passed atraumatically per the urethra and the bladder was inspected. No tumors, clots, stones, or foreign bodies were identified. The left ureteral orifice was in its normal anatomic position and effluxing clear urine. The scope was retroflexed and the bladder neck was unremarkable. The patient tolerated the procedure well.       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     Follow-up in 6 months for continued management of TCC bladder.     Scribe Attestation  By signing my name below, I, Heidi Finch , Skyler   attest that this documentation has been  prepared under the direction and in the presence of Bhupendra Gorman MD.

## 2024-04-09 ENCOUNTER — PATIENT OUTREACH (OUTPATIENT)
Dept: PRIMARY CARE | Facility: CLINIC | Age: 84
End: 2024-04-09
Payer: MEDICARE

## 2024-04-09 NOTE — PROGRESS NOTES
Unable to reach patients daughter for one month post discharge follow up call.   LVM with call back number for patient to call if needed

## 2024-04-17 ENCOUNTER — APPOINTMENT (OUTPATIENT)
Dept: ORTHOPEDIC SURGERY | Facility: CLINIC | Age: 84
End: 2024-04-17
Payer: MEDICARE

## 2024-04-24 ENCOUNTER — APPOINTMENT (OUTPATIENT)
Dept: ORTHOPEDIC SURGERY | Facility: CLINIC | Age: 84
End: 2024-04-24
Payer: MEDICARE

## 2024-05-01 ENCOUNTER — CLINICAL SUPPORT (OUTPATIENT)
Dept: ORTHOPEDIC SURGERY | Facility: CLINIC | Age: 84
End: 2024-05-01
Payer: MEDICARE

## 2024-05-01 ENCOUNTER — OFFICE VISIT (OUTPATIENT)
Dept: ORTHOPEDIC SURGERY | Facility: CLINIC | Age: 84
End: 2024-05-01
Payer: MEDICARE

## 2024-05-01 DIAGNOSIS — M25.561 ACUTE PAIN OF RIGHT KNEE: ICD-10-CM

## 2024-05-01 DIAGNOSIS — S82.009A CLOSED FRACTURE OF PATELLA, INITIAL ENCOUNTER: ICD-10-CM

## 2024-05-01 DIAGNOSIS — S82.009S: Primary | ICD-10-CM

## 2024-05-01 PROCEDURE — 73560 X-RAY EXAM OF KNEE 1 OR 2: CPT | Mod: RIGHT SIDE | Performed by: ORTHOPAEDIC SURGERY

## 2024-05-01 PROCEDURE — 99024 POSTOP FOLLOW-UP VISIT: CPT | Performed by: ORTHOPAEDIC SURGERY

## 2024-05-01 PROCEDURE — 1159F MED LIST DOCD IN RCRD: CPT | Performed by: PHYSICIAN ASSISTANT

## 2024-05-01 NOTE — PROGRESS NOTES
History of present illness patient is follow-up right knee patella fracture noticed on CAT scan only.  Patient is ambulating well today      Physical exam:      General: No acute distress or breathing difficulty or discomfort, pleasant and cooperative with the examination.    Extremities: Right knee patient can perform good straight leg raise with flexion back to 130 degrees there is no tenderness over her patella      Diagnostic studies: X-rays taken today 2 views of the right knee read by Dr. Pascual Parker    Impression: Status post right patellar fracture healing well    Plan: Patient has no pain and no longer needs to wear brace and she will wean off of the walker as she is able we discussed the importance of maintaining balance and stability with ambulation.  She also has a cane which she may use.  We offered a follow-up she declined she will follow-up with us on as-needed basis for the right knee.

## 2024-05-06 DIAGNOSIS — F41.1 GENERALIZED ANXIETY DISORDER: ICD-10-CM

## 2024-05-06 RX ORDER — ALPRAZOLAM 0.5 MG/1
0.5 TABLET ORAL 3 TIMES DAILY PRN
Qty: 90 TABLET | Refills: 0 | Status: SHIPPED | OUTPATIENT
Start: 2024-05-06 | End: 2024-06-05

## 2024-05-06 NOTE — TELEPHONE ENCOUNTER
Rx Refill Request Telephone Encounter    Name:  Jodi Nuñez  :  [id number]  Medication Name:  ALPRAZolam (Xanax) 0.5 mg tablet     Specific Pharmacy location:  [id number]   Date of last appointment:  3/19/24  Date of next appointment:  N/A  Best number to reach patient:  [phone]

## 2024-05-09 DIAGNOSIS — K21.9 GASTROESOPHAGEAL REFLUX DISEASE WITHOUT ESOPHAGITIS: ICD-10-CM

## 2024-05-09 DIAGNOSIS — G25.81 RESTLESS LEG SYNDROME: ICD-10-CM

## 2024-05-09 DIAGNOSIS — F41.1 GENERALIZED ANXIETY DISORDER: ICD-10-CM

## 2024-05-09 RX ORDER — PANTOPRAZOLE SODIUM 40 MG/1
40 TABLET, DELAYED RELEASE ORAL DAILY
Qty: 30 TABLET | Refills: 0 | Status: SHIPPED | OUTPATIENT
Start: 2024-05-09

## 2024-05-09 RX ORDER — BUSPIRONE HYDROCHLORIDE 10 MG/1
10 TABLET ORAL 3 TIMES DAILY
Qty: 90 TABLET | Refills: 3 | Status: SHIPPED | OUTPATIENT
Start: 2024-05-09

## 2024-05-09 RX ORDER — ROPINIROLE 0.5 MG/1
0.5 TABLET, FILM COATED ORAL NIGHTLY
Qty: 30 TABLET | Refills: 0 | Status: SHIPPED | OUTPATIENT
Start: 2024-05-09 | End: 2024-06-08

## 2024-05-09 NOTE — TELEPHONE ENCOUNTER
Rx Refill Request     Name: Jodi Nuñez  :  1940   Medication Name:  ropinorole, pantoprazole, and buspirone   Specific Pharmacy location:  [facility] in [facility]   Date of last appointment:  2023  Date of next appointment:  Visit date not found   Best number to reach patient:  [phone]

## 2024-05-16 DIAGNOSIS — E55.9 VITAMIN D DEFICIENCY: ICD-10-CM

## 2024-05-16 RX ORDER — ASPIRIN 325 MG
50000 TABLET, DELAYED RELEASE (ENTERIC COATED) ORAL
Qty: 12 CAPSULE | Refills: 3 | Status: SHIPPED | OUTPATIENT
Start: 2024-05-19

## 2024-05-16 NOTE — TELEPHONE ENCOUNTER
Rx Refill Request     Name: Jodi Nuñez  :  1940   Medication Name:  vitamin d3  Specific Pharmacy location:  [facility] in [facility]  Date of last appointment:  2023  Date of next appointment:  Visit date not found   Best number to reach patient:  [phone]

## 2024-05-28 ENCOUNTER — PATIENT OUTREACH (OUTPATIENT)
Dept: PRIMARY CARE | Facility: CLINIC | Age: 84
End: 2024-05-28
Payer: MEDICARE

## 2024-06-10 DIAGNOSIS — F41.1 GENERALIZED ANXIETY DISORDER: ICD-10-CM

## 2024-06-10 RX ORDER — ALPRAZOLAM 0.5 MG/1
0.5 TABLET ORAL 3 TIMES DAILY PRN
Qty: 90 TABLET | Refills: 0 | OUTPATIENT
Start: 2024-06-10 | End: 2024-07-10

## 2024-06-10 NOTE — TELEPHONE ENCOUNTER
Rx Refill Request Telephone Encounter    Name:  Jodi Nuñez  :  [id number]  Medication Name:    ALPRAZolam (Xanax) 0.5 mg tablet     Specific Pharmacy location:  [facility]  Date of last appointment:  23  Date of next appointment:  NA  Best number to reach patient:  [phone]

## 2024-06-18 ENCOUNTER — APPOINTMENT (OUTPATIENT)
Dept: PRIMARY CARE | Facility: CLINIC | Age: 84
End: 2024-06-18
Payer: MEDICARE

## 2024-06-18 VITALS
WEIGHT: 130.2 LBS | HEIGHT: 61 IN | HEART RATE: 69 BPM | OXYGEN SATURATION: 98 % | DIASTOLIC BLOOD PRESSURE: 68 MMHG | SYSTOLIC BLOOD PRESSURE: 121 MMHG | BODY MASS INDEX: 24.58 KG/M2

## 2024-06-18 DIAGNOSIS — I48.0 PAROXYSMAL ATRIAL FIBRILLATION (MULTI): ICD-10-CM

## 2024-06-18 DIAGNOSIS — Z02.83 ENCOUNTER FOR DRUG SCREENING: ICD-10-CM

## 2024-06-18 DIAGNOSIS — F41.9 ANXIETY: ICD-10-CM

## 2024-06-18 DIAGNOSIS — Z00.00 ROUTINE GENERAL MEDICAL EXAMINATION AT HEALTH CARE FACILITY: Primary | ICD-10-CM

## 2024-06-18 DIAGNOSIS — F41.1 GENERALIZED ANXIETY DISORDER: ICD-10-CM

## 2024-06-18 DIAGNOSIS — I10 ESSENTIAL HYPERTENSION: ICD-10-CM

## 2024-06-18 PROCEDURE — 1170F FXNL STATUS ASSESSED: CPT | Performed by: FAMILY MEDICINE

## 2024-06-18 PROCEDURE — G0009 ADMIN PNEUMOCOCCAL VACCINE: HCPCS | Performed by: FAMILY MEDICINE

## 2024-06-18 PROCEDURE — 1123F ACP DISCUSS/DSCN MKR DOCD: CPT | Performed by: FAMILY MEDICINE

## 2024-06-18 PROCEDURE — 90677 PCV20 VACCINE IM: CPT | Performed by: FAMILY MEDICINE

## 2024-06-18 PROCEDURE — 1160F RVW MEDS BY RX/DR IN RCRD: CPT | Performed by: FAMILY MEDICINE

## 2024-06-18 PROCEDURE — 3074F SYST BP LT 130 MM HG: CPT | Performed by: FAMILY MEDICINE

## 2024-06-18 PROCEDURE — G0439 PPPS, SUBSEQ VISIT: HCPCS | Performed by: FAMILY MEDICINE

## 2024-06-18 PROCEDURE — 1159F MED LIST DOCD IN RCRD: CPT | Performed by: FAMILY MEDICINE

## 2024-06-18 PROCEDURE — 99214 OFFICE O/P EST MOD 30 MIN: CPT | Performed by: FAMILY MEDICINE

## 2024-06-18 PROCEDURE — 3078F DIAST BP <80 MM HG: CPT | Performed by: FAMILY MEDICINE

## 2024-06-18 PROCEDURE — 1158F ADVNC CARE PLAN TLK DOCD: CPT | Performed by: FAMILY MEDICINE

## 2024-06-18 ASSESSMENT — ENCOUNTER SYMPTOMS
HEADACHES: 0
COUGH: 0
ABDOMINAL PAIN: 0
NAUSEA: 0
DIZZINESS: 0
NECK PAIN: 0
WEAKNESS: 0
FATIGUE: 0
CONSTIPATION: 0
VOMITING: 0
LOSS OF SENSATION IN FEET: 0
MYALGIAS: 0
DIFFICULTY URINATING: 0
NUMBNESS: 0
ARTHRALGIAS: 0
DIARRHEA: 0
BRUISES/BLEEDS EASILY: 0
DYSPHORIC MOOD: 0
BLOOD IN STOOL: 0
CHEST TIGHTNESS: 0
NERVOUS/ANXIOUS: 0
OCCASIONAL FEELINGS OF UNSTEADINESS: 0
SHORTNESS OF BREATH: 0
EYE DISCHARGE: 0
DEPRESSION: 0
SORE THROAT: 0
ACTIVITY CHANGE: 0
ADENOPATHY: 0
BACK PAIN: 0
HEMATURIA: 0

## 2024-06-18 ASSESSMENT — ACTIVITIES OF DAILY LIVING (ADL)
MANAGING_FINANCES: NEEDS ASSISTANCE
GROCERY_SHOPPING: NEEDS ASSISTANCE
DRESSING: INDEPENDENT
TAKING_MEDICATION: INDEPENDENT
DOING_HOUSEWORK: INDEPENDENT
BATHING: INDEPENDENT

## 2024-06-18 ASSESSMENT — PATIENT HEALTH QUESTIONNAIRE - PHQ9
1. LITTLE INTEREST OR PLEASURE IN DOING THINGS: NOT AT ALL
2. FEELING DOWN, DEPRESSED OR HOPELESS: NOT AT ALL
SUM OF ALL RESPONSES TO PHQ9 QUESTIONS 1 AND 2: 0

## 2024-06-18 NOTE — PROGRESS NOTES
"  Subjective   Reason for Visit: Jodi Nuñez is an 83 y.o. y.o. female here for a Medicare Wellness visit.               HPI    Patient Care Team:  Manoj Bustamante MD as PCP - General  Manoj Bustamante MD as PCP - [facility] PCP     Review of Systems    Objective   Vitals:  /68 (BP Location: Right arm, BP Cuff Size: Small adult)   Pulse 69   Ht 1.549 m (5' 1\")   Wt 59.1 kg (130 lb 3.2 oz)   SpO2 98%   BMI 24.60 kg/m²       Physical Exam    Assessment/Plan   Problem List Items Addressed This Visit    None  Patient education provided.  Stay current with age appropriate health maintenance as instructed.  Appointment here or ER with new or worsening symptoms'  Keep appropriate follow-up visit.  Stay current with proper immunizations   "

## 2024-06-18 NOTE — PROGRESS NOTES
"Subjective   Reason for Visit: Jodi Nuñez is an 83 y.o. female here for a Medicare Wellness visit.     Past Medical, Surgical, and Family History reviewed and updated in chart.         HPI  Patient here for annual Medicare wellness    Also general checkup visit    Patient accompanied today by a friend of hers who is a nurse    A-fib stable  No chest pain or shortness of breath  No syncope  Keep cardiac follow-up    Anxiety stable  No suicidal ideation no psychotic or manic symptoms  Tolerates medicine well  Needs urine drug screen and CSA today    Hypertension well-controlled  Tolerates medicine  No chest pain or shortness of breath    Patient declines mammogram or colon exam  Patient Care Team:  Manoj Bustamante MD as PCP - General  Manoj Bustamante MD as PCP - [employer] PCP     Review of Systems   Constitutional:  Negative for activity change and fatigue.   HENT:  Negative for congestion and sore throat.    Eyes:  Negative for discharge.   Respiratory:  Negative for cough, chest tightness and shortness of breath.    Cardiovascular:  Negative for chest pain and leg swelling.   Gastrointestinal:  Negative for abdominal pain, blood in stool, constipation, diarrhea, nausea and vomiting.   Endocrine: Negative for cold intolerance and heat intolerance.   Genitourinary:  Negative for difficulty urinating and hematuria.   Musculoskeletal:  Negative for arthralgias, back pain, gait problem, myalgias and neck pain.   Allergic/Immunologic: Negative for environmental allergies.   Neurological:  Negative for dizziness, syncope, weakness, numbness and headaches.   Hematological:  Negative for adenopathy. Does not bruise/bleed easily.   Psychiatric/Behavioral:  Negative for dysphoric mood. The patient is not nervous/anxious.    All other systems reviewed and are negative.      Objective   Vitals:  /68 (BP Location: Right arm, BP Cuff Size: Small adult)   Pulse 69   Ht 1.549 m (5' 1\")   Wt 59.1 kg " (130 lb 3.2 oz)   SpO2 98%   BMI 24.60 kg/m²       Physical Exam  Vitals and nursing note reviewed.   Constitutional:       General: She is not in acute distress.     Appearance: Normal appearance.   HENT:      Head: Normocephalic and atraumatic.      Right Ear: Tympanic membrane, ear canal and external ear normal.      Left Ear: Tympanic membrane, ear canal and external ear normal.      Nose: Nose normal.      Mouth/Throat:      Mouth: Mucous membranes are moist.      Pharynx: Oropharynx is clear. No oropharyngeal exudate or posterior oropharyngeal erythema.   Eyes:      Extraocular Movements: Extraocular movements intact.      Conjunctiva/sclera: Conjunctivae normal.      Pupils: Pupils are equal, round, and reactive to light.   Cardiovascular:      Rate and Rhythm: Normal rate and regular rhythm.      Pulses: Normal pulses.      Heart sounds: Normal heart sounds. No murmur heard.  Pulmonary:      Effort: Pulmonary effort is normal. No respiratory distress.      Breath sounds: Normal breath sounds. No wheezing or rales.   Abdominal:      General: Abdomen is flat. Bowel sounds are normal. There is no distension.      Palpations: Abdomen is soft. There is no mass.      Tenderness: There is no abdominal tenderness.   Musculoskeletal:         General: No swelling or deformity. Normal range of motion.      Cervical back: Normal range of motion and neck supple.      Right lower leg: No edema.      Left lower leg: No edema.   Lymphadenopathy:      Cervical: No cervical adenopathy.   Skin:     General: Skin is warm and dry.      Capillary Refill: Capillary refill takes less than 2 seconds.      Findings: No lesion or rash.   Neurological:      General: No focal deficit present.      Mental Status: She is alert and oriented to person, place, and time.      Cranial Nerves: No cranial nerve deficit.      Motor: No weakness.   Psychiatric:         Mood and Affect: Mood normal.         Behavior: Behavior normal.          Thought Content: Thought content normal.         Judgment: Judgment normal.         Assessment/Plan   Problem List Items Addressed This Visit       Paroxysmal atrial fibrillation (Multi)    Overview     Diagnosis reviewed and stable  Follow-up in 1 year and sooner with issues           Essential hypertension    Generalized anxiety disorder     Other Visit Diagnoses       Routine general medical examination at health care facility    -  Primary    Anxiety        Relevant Orders    Follow Up In [facility] - PCP    Encounter for drug screening              Patient education provided.  Stay current with age appropriate health maintenance as instructed.  Appointment here or ER with new or worsening symptoms'  Keep appropriate follow-up visit.  Stay current with proper immunizations  3 months  ACP reviewed  Stay current with UDS and CSA

## 2024-06-19 DIAGNOSIS — K21.9 GASTROESOPHAGEAL REFLUX DISEASE WITHOUT ESOPHAGITIS: ICD-10-CM

## 2024-06-19 DIAGNOSIS — G25.81 RESTLESS LEG SYNDROME: ICD-10-CM

## 2024-06-19 DIAGNOSIS — G30.1 MODERATE LATE ONSET ALZHEIMER'S DEMENTIA WITHOUT BEHAVIORAL DISTURBANCE, PSYCHOTIC DISTURBANCE, MOOD DISTURBANCE, OR ANXIETY (MULTI): ICD-10-CM

## 2024-06-19 DIAGNOSIS — I10 ESSENTIAL HYPERTENSION: ICD-10-CM

## 2024-06-19 DIAGNOSIS — F02.B0 MODERATE LATE ONSET ALZHEIMER'S DEMENTIA WITHOUT BEHAVIORAL DISTURBANCE, PSYCHOTIC DISTURBANCE, MOOD DISTURBANCE, OR ANXIETY (MULTI): ICD-10-CM

## 2024-06-19 DIAGNOSIS — F41.1 GENERALIZED ANXIETY DISORDER: ICD-10-CM

## 2024-06-19 DIAGNOSIS — E78.2 MIXED HYPERLIPIDEMIA: ICD-10-CM

## 2024-06-19 NOTE — TELEPHONE ENCOUNTER
Rx Refill Request Telephone Encounter    Name:  Jodi Nuñez  :  [id number]  Medication Name:    ALPRAZolam (Xanax) 0.5 mg tablet     Specific Pharmacy location:    [id number] - [place] [address]     Date of last appointment:  24  Date of next appointment:  24  Best number to reach patient:  [phone]

## 2024-06-19 NOTE — TELEPHONE ENCOUNTER
Daughter called asking about the refill on this. And wanting to know why it wasn't filled yet. Lidia will explain that Jodi ness needed to complete her CSA & UDS to get more refills which was done during 6/18 appointment. Is she able to get the refill before the UDS comes back?

## 2024-06-20 RX ORDER — MEMANTINE HYDROCHLORIDE 10 MG/1
10 TABLET ORAL 2 TIMES DAILY
Qty: 180 TABLET | Refills: 3 | Status: SHIPPED | OUTPATIENT
Start: 2024-06-20

## 2024-06-20 RX ORDER — SERTRALINE HYDROCHLORIDE 100 MG/1
100 TABLET, FILM COATED ORAL 2 TIMES DAILY
Qty: 60 TABLET | Refills: 0 | Status: SHIPPED | OUTPATIENT
Start: 2024-06-20

## 2024-06-20 RX ORDER — ATORVASTATIN CALCIUM 40 MG/1
40 TABLET, FILM COATED ORAL DAILY
Qty: 90 TABLET | Refills: 3 | Status: SHIPPED | OUTPATIENT
Start: 2024-06-20

## 2024-06-20 RX ORDER — AMLODIPINE BESYLATE 5 MG/1
5 TABLET ORAL DAILY
Qty: 90 TABLET | Refills: 3 | Status: SHIPPED | OUTPATIENT
Start: 2024-06-20

## 2024-06-20 RX ORDER — DONEPEZIL HYDROCHLORIDE 10 MG/1
10 TABLET, FILM COATED ORAL NIGHTLY
Qty: 90 TABLET | Refills: 3 | Status: SHIPPED | OUTPATIENT
Start: 2024-06-20

## 2024-06-20 RX ORDER — ALPRAZOLAM 0.5 MG/1
0.5 TABLET ORAL 3 TIMES DAILY PRN
Qty: 90 TABLET | Refills: 0 | Status: SHIPPED | OUTPATIENT
Start: 2024-06-20 | End: 2024-07-20

## 2024-06-20 RX ORDER — ROPINIROLE 0.5 MG/1
0.5 TABLET, FILM COATED ORAL NIGHTLY
Qty: 90 TABLET | Refills: 3 | Status: SHIPPED | OUTPATIENT
Start: 2024-06-20 | End: 2025-06-15

## 2024-06-20 RX ORDER — LOSARTAN POTASSIUM 25 MG/1
25 TABLET ORAL 2 TIMES DAILY
Qty: 180 TABLET | Refills: 3 | Status: SHIPPED | OUTPATIENT
Start: 2024-06-20

## 2024-06-20 RX ORDER — PANTOPRAZOLE SODIUM 40 MG/1
40 TABLET, DELAYED RELEASE ORAL DAILY
Qty: 90 TABLET | Refills: 3 | Status: SHIPPED | OUTPATIENT
Start: 2024-06-20

## 2024-07-22 DIAGNOSIS — F41.1 GENERALIZED ANXIETY DISORDER: ICD-10-CM

## 2024-07-22 RX ORDER — ALPRAZOLAM 0.5 MG/1
0.5 TABLET ORAL 3 TIMES DAILY PRN
Qty: 90 TABLET | Refills: 0 | Status: SHIPPED | OUTPATIENT
Start: 2024-07-22 | End: 2024-08-21

## 2024-07-22 NOTE — TELEPHONE ENCOUNTER
Rx Refill Request Telephone Encounter    Name:  Jodi Nuñez  :  [id number]  Medication Name:    ALPRAZolam (Xanax) 0.5 mg tablet     Specific Pharmacy location:  [facility]  Date of last appointment:  24  Date of next appointment:  24  Best number to reach patient:  Phone:   [phone]

## 2024-07-25 ENCOUNTER — TELEPHONE (OUTPATIENT)
Dept: PRIMARY CARE | Facility: CLINIC | Age: 84
End: 2024-07-25
Payer: MEDICARE

## 2024-07-25 DIAGNOSIS — G89.29 CHRONIC BILATERAL LOW BACK PAIN WITHOUT SCIATICA: Primary | ICD-10-CM

## 2024-07-25 DIAGNOSIS — M54.50 CHRONIC BILATERAL LOW BACK PAIN WITHOUT SCIATICA: Primary | ICD-10-CM

## 2024-07-25 NOTE — TELEPHONE ENCOUNTER
Antonina from [facility] called said that Jodi has an appt on August 1 @ 2:40.  She will needs a referral for Physical Therapy for lower back faxed to [phone].  If any questions call [phone].   no

## 2024-08-01 ENCOUNTER — TELEPHONE (OUTPATIENT)
Dept: PRIMARY CARE | Facility: CLINIC | Age: 84
End: 2024-08-01
Payer: MEDICARE

## 2024-08-01 NOTE — TELEPHONE ENCOUNTER
Colette from [facility] wants the referral faxed over to [phone] so I faxed it over to the office she is there for an appt.

## 2024-08-01 NOTE — TELEPHONE ENCOUNTER
[facility] physical called stating she needs referral for her back. Patient is scheduled for today at 3pm  Request was placed on 7/25/24   Please fax asap   [phone]

## 2024-08-07 DIAGNOSIS — F41.1 GENERALIZED ANXIETY DISORDER: Primary | ICD-10-CM

## 2024-08-07 NOTE — TELEPHONE ENCOUNTER
Rx Refill Request     Name: Jodi Nuñez  :  1940   Medication Name:    busPIRone (Buspar) 10 mg tablet    Last fill: 2024 90 day supply Q 90 R 1  Specific Pharmacy location:  [facility]  Date of last appointment: 2024  Date of next appointment:  2024  Best number to reach patient:  [phone]       RX PENDED to [facility] as directed by FAX

## 2024-08-08 RX ORDER — BUSPIRONE HYDROCHLORIDE 10 MG/1
10 TABLET ORAL 3 TIMES DAILY
Qty: 270 TABLET | Refills: 0 | Status: SHIPPED | OUTPATIENT
Start: 2024-08-08 | End: 2024-11-06

## 2024-08-12 ENCOUNTER — APPOINTMENT (OUTPATIENT)
Dept: NEUROLOGY | Facility: CLINIC | Age: 84
End: 2024-08-12
Payer: MEDICARE

## 2024-08-12 VITALS
SYSTOLIC BLOOD PRESSURE: 150 MMHG | DIASTOLIC BLOOD PRESSURE: 74 MMHG | WEIGHT: 131.4 LBS | HEIGHT: 61 IN | BODY MASS INDEX: 24.81 KG/M2 | HEART RATE: 60 BPM

## 2024-08-12 DIAGNOSIS — F41.1 GENERALIZED ANXIETY DISORDER: ICD-10-CM

## 2024-08-12 DIAGNOSIS — F02.B4 MODERATE EARLY ONSET ALZHEIMER'S DEMENTIA WITH ANXIETY (MULTI): Primary | ICD-10-CM

## 2024-08-12 DIAGNOSIS — I67.82 ISCHEMIC CEREBROVASCULAR DISEASE: ICD-10-CM

## 2024-08-12 DIAGNOSIS — R26.0 ATAXIC GAIT: ICD-10-CM

## 2024-08-12 DIAGNOSIS — G30.0 MODERATE EARLY ONSET ALZHEIMER'S DEMENTIA WITH ANXIETY (MULTI): Primary | ICD-10-CM

## 2024-08-12 PROCEDURE — 1159F MED LIST DOCD IN RCRD: CPT | Performed by: PSYCHIATRY & NEUROLOGY

## 2024-08-12 PROCEDURE — 3077F SYST BP >= 140 MM HG: CPT | Performed by: PSYCHIATRY & NEUROLOGY

## 2024-08-12 PROCEDURE — 3078F DIAST BP <80 MM HG: CPT | Performed by: PSYCHIATRY & NEUROLOGY

## 2024-08-12 PROCEDURE — 99214 OFFICE O/P EST MOD 30 MIN: CPT | Performed by: PSYCHIATRY & NEUROLOGY

## 2024-08-12 ASSESSMENT — ENCOUNTER SYMPTOMS
DECREASED CONCENTRATION: 1
FATIGUE: 0
ARTHRALGIAS: 0
NECK STIFFNESS: 0
SEIZURES: 0
NECK PAIN: 0
BACK PAIN: 0
NAUSEA: 0
VOMITING: 0
PHOTOPHOBIA: 0
AGITATION: 0
SPEECH DIFFICULTY: 0
CONFUSION: 0
HEADACHES: 0
TROUBLE SWALLOWING: 0
PALPITATIONS: 0
ADENOPATHY: 0
FEVER: 0
SLEEP DISTURBANCE: 1
COUGH: 0
SHORTNESS OF BREATH: 0
FREQUENCY: 0
DIZZINESS: 0
JOINT SWELLING: 0
LIGHT-HEADEDNESS: 0
WHEEZING: 0
NUMBNESS: 0
DIFFICULTY URINATING: 0
TREMORS: 0
SINUS PRESSURE: 0
HALLUCINATIONS: 0
NERVOUS/ANXIOUS: 1
WEAKNESS: 0
UNEXPECTED WEIGHT CHANGE: 0
BRUISES/BLEEDS EASILY: 0
HYPERACTIVE: 0
ABDOMINAL PAIN: 0
EYE PAIN: 0
FACIAL ASYMMETRY: 0

## 2024-08-12 ASSESSMENT — PATIENT HEALTH QUESTIONNAIRE - PHQ9
2. FEELING DOWN, DEPRESSED OR HOPELESS: NOT AT ALL
SUM OF ALL RESPONSES TO PHQ9 QUESTIONS 1 & 2: 0
1. LITTLE INTEREST OR PLEASURE IN DOING THINGS: NOT AT ALL

## 2024-08-12 NOTE — PROGRESS NOTES
Jodi Nuñez  84 y.o.       SUBJECTIVE    HPI   Jodi is a 84-year-old young lady who was seen today for follow-up of her dementia Alzheimer type with restless leg syndrome and anxiety disorder since last seen she has been having some problems with her short-term memory and some social issues to the point she has problems sleeping at night but when she is sleeping she is fast asleep and had a fall while she tried to roll out of the bed.  Today her physical and neurological exam is normal have advised her to stop taking Xanax and to continue with the BuSpar and Zoloft and try Benadryl or ZzzQuil at nighttime for sleep and use melatonin 10 mg.  I have discussed the sleep hygiene behavior modification importance of exercise and diet in view of her dementia which she and her daughter understood and see her back in 6 months    I did review the plan course and the prognosis of this condition which her daughter who is a nurse understood  Due to technical limitations of voice recognition and human error, this note may not accurately reflect the care of the patient.    Review of Systems   Constitutional:  Negative for fatigue, fever and unexpected weight change.   HENT:  Negative for dental problem, ear pain, hearing loss, sinus pressure, tinnitus and trouble swallowing.    Eyes:  Negative for photophobia, pain and visual disturbance.   Respiratory:  Negative for cough, shortness of breath and wheezing.    Cardiovascular:  Negative for chest pain, palpitations and leg swelling.   Gastrointestinal:  Negative for abdominal pain, nausea and vomiting.   Genitourinary:  Negative for difficulty urinating, enuresis and frequency.   Musculoskeletal:  Positive for gait problem. Negative for arthralgias, back pain, joint swelling, neck pain and neck stiffness.   Skin:  Negative for pallor and rash.   Allergic/Immunologic: Negative for food allergies.   Neurological:  Negative for dizziness, tremors, seizures, syncope, facial asymmetry,  speech difficulty, weakness, light-headedness, numbness and headaches.   Hematological:  Negative for adenopathy. Does not bruise/bleed easily.   Psychiatric/Behavioral:  Positive for decreased concentration and sleep disturbance. Negative for agitation, behavioral problems, confusion and hallucinations. The patient is nervous/anxious. The patient is not hyperactive.         Patient Active Problem List   Diagnosis    Arthritis    Ataxic gait    Paroxysmal atrial fibrillation (Multi)    Chronic kidney disease, stage 3a (Multi)    Current every day smoker    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)    Elevated calcitonin level    Coronary artery disease involving native coronary artery of native heart without angina pectoris    Elevated coronary artery calcium score    Essential hypertension    Gallstones    Generalized anxiety disorder    Glaucoma of both eyes    Gross hematuria    Hyperglycemia    Mixed hyperlipidemia    Ischemic cerebrovascular disease    LVH (left ventricular hypertrophy)    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)    Memory loss    Muscle cramp    Onychomycosis    Restless leg syndrome    Skin nodule    Tremor, essential    Urethral stricture    Vitamin D deficiency    Abdominal pain    Constipation    Tremor    Urinary tract infection    Bilateral renal stones    Skin lesion    Moderate late onset Alzheimer's dementia without behavioral disturbance, psychotic disturbance, mood disturbance, or anxiety (Multi)    Ataxia    Closed fracture of patella, initial encounter    Fall    Impaired functional mobility, balance, and endurance    Visit for monitoring Lovenox therapy    Chronic renal disease, stage IV (Multi)     Past Medical History:   Diagnosis Date    Abnormal bladder cytology 05/02/2023    Abnormal EKG 05/02/2023    Abnormal vaginal bleeding 05/02/2023    Impacted cerumen, bilateral 01/13/2021    Bilateral impacted cerumen    Other gastritis without bleeding  "04/08/2021    Gastritis, bile acid reflux    Personal history of diseases of the skin and subcutaneous tissue 09/16/2019    History of contact dermatitis    Personal history of other diseases of the digestive system 01/13/2021    History of glossitis    Personal history of other diseases of urinary system 04/21/2022    History of hematuria    Personal history of other specified conditions 12/03/2020    History of epistaxis    Tongue lesion 05/02/2023    Unspecified symptoms and signs involving the genitourinary system     UTI symptoms     Past Surgical History:   Procedure Laterality Date    OTHER SURGICAL HISTORY  06/10/2019    Cataract surgery    OTHER SURGICAL HISTORY  03/16/2022    Colonoscopy    OTHER SURGICAL HISTORY  03/16/2022    Hysterectomy    OTHER SURGICAL HISTORY  09/07/2022    Nephroureterectomy       reports that she has been smoking cigarettes. She has never been exposed to tobacco smoke. She has never used smokeless tobacco. She reports that she does not drink alcohol and does not use drugs.    /74 (BP Location: Left arm, Patient Position: Sitting, BP Cuff Size: Adult)   Pulse 60   Ht 1.549 m (5' 1\")   Wt 59.6 kg (131 lb 6.4 oz)   BMI 24.83 kg/m²     OBJECTIVE  Physical Exam/Neurological Exam     Constitutional: General appearance: no acute distress   Auscultation of Heart: Regular rate and rhythm, no murmurs, normal S1 and S2.   Carotid Arteries: Intact without any bruits.   Neck is supple.   No lymph adenopathy.   Peripheral Vascular Exam: Pulses +2 and equal in all extremities. No swelling, varicosities, edema or tenderness to palpations.   Abdomen is soft, nondistended. No organomegaly.  Mental status: The patient was in no distress, alert, interactive and cooperative. Affect is appropriate.   Orientation: oriented to person, oriented to place and oriented to time.   Memory: impaired More short-term than long-term..   Attention: normal attention span and normal concentrating ability. "   Language: normal comprehension and no difficulty naming common objects.   Fund of knowledge: Patient displays adequate knowledge of current events, adequate fund of knowledge regarding past history and adequate fund of knowledge regarding vocabulary.   Eyes: The ophthalmoscopic examination was normal. The fundi are visualized with normal disc margins and without.  Cranial nerve II: Visual fields full to confrontation.   Cranial nerves III, IV, and VI: Pupils round, equally reactive to light; no ptosis. EOMs intact. No nystagmus.   Cranial Nerve V: Facial sensation intact bilaterally.   Cranial nerve VII: Normal and symmetric facial strength.   Cranial nerve VIII: Hearing is i head. But she is able to hear with loud frequencies..   Cranial nerves IX and X: Palate elevates symmetrically.   Cranial nerve XI: Shoulder shrug and neck rotation strength are intact.   Cranial nerve XII: Tongue midline with normal strength.   Motor: Motor exam was normal. Muscle bulk was normal in both upper and lower extremities. Muscle tone was normal in both upper and lower extremities. Muscle strength was 5/5 throughout. no abnormal or adventitious movements were present.   Deep Tendon Reflexes: left biceps 2+ , right biceps 2+, left triceps 2+, right triceps 2+, left brachioradialis 2+, right brachioradialis 2+, left patella 2+, right patella 2+, left ankle jerk 2+, right ankle jerk 2+   Plantar Reflex: Toes downgoing to plantar stimulation on the left. Toes downgoing to plantar stimulation on the right.   Sensory Exam: Normal to light touch.   Coordination: There is no limb dystaxia and rapid alternating movements   Gait is ataxic and she was able to ambulate with the help of a cane.    ASSESSMENT/PLAN  Diagnoses and all orders for this visit:  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)  Ischemic cerebrovascular disease  Ataxic gait  Generalized anxiety disorder        Serafin Kowalski MD  8/12/2024  2:34 PM

## 2024-08-19 ENCOUNTER — TELEPHONE (OUTPATIENT)
Dept: UROLOGY | Facility: CLINIC | Age: 84
End: 2024-08-19

## 2024-08-19 ENCOUNTER — OFFICE VISIT (OUTPATIENT)
Dept: PRIMARY CARE | Facility: CLINIC | Age: 84
End: 2024-08-19
Payer: MEDICARE

## 2024-08-19 ENCOUNTER — LAB (OUTPATIENT)
Dept: LAB | Facility: LAB | Age: 84
End: 2024-08-19
Payer: MEDICARE

## 2024-08-19 VITALS
SYSTOLIC BLOOD PRESSURE: 130 MMHG | BODY MASS INDEX: 23.94 KG/M2 | HEIGHT: 61 IN | WEIGHT: 126.8 LBS | OXYGEN SATURATION: 95 % | HEART RATE: 90 BPM | DIASTOLIC BLOOD PRESSURE: 73 MMHG

## 2024-08-19 DIAGNOSIS — R53.82 CHRONIC FATIGUE: ICD-10-CM

## 2024-08-19 DIAGNOSIS — I48.0 PAROXYSMAL ATRIAL FIBRILLATION (MULTI): ICD-10-CM

## 2024-08-19 DIAGNOSIS — G89.29 CHRONIC BILATERAL LOW BACK PAIN WITHOUT SCIATICA: ICD-10-CM

## 2024-08-19 DIAGNOSIS — M54.2 NECK PAIN: ICD-10-CM

## 2024-08-19 DIAGNOSIS — I10 ESSENTIAL HYPERTENSION: ICD-10-CM

## 2024-08-19 DIAGNOSIS — N30.00 ACUTE CYSTITIS WITHOUT HEMATURIA: ICD-10-CM

## 2024-08-19 DIAGNOSIS — I25.10 CORONARY ARTERY DISEASE INVOLVING NATIVE CORONARY ARTERY OF NATIVE HEART WITHOUT ANGINA PECTORIS: Primary | ICD-10-CM

## 2024-08-19 DIAGNOSIS — M54.6 THORACIC SPINE PAIN: ICD-10-CM

## 2024-08-19 DIAGNOSIS — D72.829 LEUKOCYTOSIS, UNSPECIFIED TYPE: Primary | ICD-10-CM

## 2024-08-19 DIAGNOSIS — C65.1 MALIGNANT NEOPLASM OF RIGHT RENAL PELVIS (MULTI): ICD-10-CM

## 2024-08-19 DIAGNOSIS — M54.50 CHRONIC BILATERAL LOW BACK PAIN WITHOUT SCIATICA: ICD-10-CM

## 2024-08-19 LAB
ALBUMIN SERPL BCP-MCNC: 4.3 G/DL (ref 3.4–5)
ALP SERPL-CCNC: 74 U/L (ref 33–136)
ALT SERPL W P-5'-P-CCNC: 10 U/L (ref 7–45)
ANION GAP SERPL CALC-SCNC: 19 MMOL/L (ref 10–20)
AST SERPL W P-5'-P-CCNC: 13 U/L (ref 9–39)
BASOPHILS # BLD AUTO: 0.08 X10*3/UL (ref 0–0.1)
BASOPHILS NFR BLD AUTO: 0.5 %
BILIRUB SERPL-MCNC: 0.5 MG/DL (ref 0–1.2)
BUN SERPL-MCNC: 41 MG/DL (ref 6–23)
CALCIUM SERPL-MCNC: 9.8 MG/DL (ref 8.6–10.3)
CHLORIDE SERPL-SCNC: 101 MMOL/L (ref 98–107)
CO2 SERPL-SCNC: 21 MMOL/L (ref 21–32)
CREAT SERPL-MCNC: 1.93 MG/DL (ref 0.5–1.05)
EGFRCR SERPLBLD CKD-EPI 2021: 25 ML/MIN/1.73M*2
EOSINOPHIL # BLD AUTO: 0 X10*3/UL (ref 0–0.4)
EOSINOPHIL NFR BLD AUTO: 0 %
ERYTHROCYTE [DISTWIDTH] IN BLOOD BY AUTOMATED COUNT: 16.2 % (ref 11.5–14.5)
GLUCOSE SERPL-MCNC: 108 MG/DL (ref 74–99)
HCT VFR BLD AUTO: 38.6 % (ref 36–46)
HGB BLD-MCNC: 12.4 G/DL (ref 12–16)
IMM GRANULOCYTES # BLD AUTO: 0.1 X10*3/UL (ref 0–0.5)
IMM GRANULOCYTES NFR BLD AUTO: 0.7 % (ref 0–0.9)
LYMPHOCYTES # BLD AUTO: 0.5 X10*3/UL (ref 0.8–3)
LYMPHOCYTES NFR BLD AUTO: 3.3 %
MCH RBC QN AUTO: 30.6 PG (ref 26–34)
MCHC RBC AUTO-ENTMCNC: 32.1 G/DL (ref 32–36)
MCV RBC AUTO: 95 FL (ref 80–100)
MONOCYTES # BLD AUTO: 0.9 X10*3/UL (ref 0.05–0.8)
MONOCYTES NFR BLD AUTO: 5.9 %
NEUTROPHILS # BLD AUTO: 13.66 X10*3/UL (ref 1.6–5.5)
NEUTROPHILS NFR BLD AUTO: 89.6 %
NRBC BLD-RTO: 0 /100 WBCS (ref 0–0)
PLATELET # BLD AUTO: 226 X10*3/UL (ref 150–450)
POTASSIUM SERPL-SCNC: 4.8 MMOL/L (ref 3.5–5.3)
PROT SERPL-MCNC: 7.4 G/DL (ref 6.4–8.2)
RBC # BLD AUTO: 4.05 X10*6/UL (ref 4–5.2)
SODIUM SERPL-SCNC: 136 MMOL/L (ref 136–145)
TSH SERPL-ACNC: 0.5 MIU/L (ref 0.44–3.98)
WBC # BLD AUTO: 15.2 X10*3/UL (ref 4.4–11.3)

## 2024-08-19 PROCEDURE — 1160F RVW MEDS BY RX/DR IN RCRD: CPT | Performed by: FAMILY MEDICINE

## 2024-08-19 PROCEDURE — 99214 OFFICE O/P EST MOD 30 MIN: CPT | Performed by: FAMILY MEDICINE

## 2024-08-19 PROCEDURE — 85025 COMPLETE CBC W/AUTO DIFF WBC: CPT

## 2024-08-19 PROCEDURE — 3078F DIAST BP <80 MM HG: CPT | Performed by: FAMILY MEDICINE

## 2024-08-19 PROCEDURE — 3075F SYST BP GE 130 - 139MM HG: CPT | Performed by: FAMILY MEDICINE

## 2024-08-19 PROCEDURE — 36415 COLL VENOUS BLD VENIPUNCTURE: CPT

## 2024-08-19 PROCEDURE — 84443 ASSAY THYROID STIM HORMONE: CPT

## 2024-08-19 PROCEDURE — 1159F MED LIST DOCD IN RCRD: CPT | Performed by: FAMILY MEDICINE

## 2024-08-19 PROCEDURE — 80053 COMPREHEN METABOLIC PANEL: CPT

## 2024-08-19 RX ORDER — CEPHALEXIN 500 MG/1
500 CAPSULE ORAL 3 TIMES DAILY
Qty: 30 CAPSULE | Refills: 0 | Status: SHIPPED | OUTPATIENT
Start: 2024-08-19 | End: 2024-08-22 | Stop reason: HOSPADM

## 2024-08-19 ASSESSMENT — ENCOUNTER SYMPTOMS
HEADACHES: 0
BACK PAIN: 1
DYSURIA: 1
CHEST TIGHTNESS: 0
NAUSEA: 0
BRUISES/BLEEDS EASILY: 0
HEMATURIA: 0
SHORTNESS OF BREATH: 0
NECK PAIN: 0
DIZZINESS: 0
ARTHRALGIAS: 0
FATIGUE: 0
COUGH: 0
CONSTIPATION: 0
SORE THROAT: 0
NERVOUS/ANXIOUS: 0
ACTIVITY CHANGE: 0
WEAKNESS: 0
BLOOD IN STOOL: 0
NUMBNESS: 0
EYE DISCHARGE: 0
DIFFICULTY URINATING: 0
DIARRHEA: 0
DYSPHORIC MOOD: 0
ADENOPATHY: 0
VOMITING: 0
MYALGIAS: 0
ABDOMINAL PAIN: 0

## 2024-08-19 NOTE — TELEPHONE ENCOUNTER
Daughter called and said patient has some labs done and they were told that her kidney function was low.  Please advise

## 2024-08-19 NOTE — PROGRESS NOTES
"Subjective   Patient ID: Jodi Nuñez is a 84 y.o. female who presents for UTI.  HPI  5 days dysuria  Now better    Having some back and leg pain  Been sleeping a lot, no fever  2 days  Pain in her neck, back, thoracic spine no trauma she would like x-rays  She is in physical therapy      CAD stable  Atrial fibrillation therapy  Keep cardiac follow-up    Renal cancer stable keep follow-up with urology    Seeing neurology trying to wean Xanax for her anxiety  No suicidal ideation no psychotic or manic symptoms            Review of Systems   Constitutional:  Negative for activity change and fatigue.   HENT:  Negative for congestion and sore throat.    Eyes:  Negative for discharge.   Respiratory:  Negative for cough, chest tightness and shortness of breath.    Cardiovascular:  Negative for chest pain and leg swelling.   Gastrointestinal:  Negative for abdominal pain, blood in stool, constipation, diarrhea, nausea and vomiting.   Endocrine: Negative for cold intolerance and heat intolerance.   Genitourinary:  Positive for dysuria. Negative for difficulty urinating and hematuria.   Musculoskeletal:  Positive for back pain. Negative for arthralgias, gait problem, myalgias and neck pain.   Allergic/Immunologic: Negative for environmental allergies.   Neurological:  Negative for dizziness, syncope, weakness, numbness and headaches.   Hematological:  Negative for adenopathy. Does not bruise/bleed easily.   Psychiatric/Behavioral:  Negative for dysphoric mood. The patient is not nervous/anxious.    All other systems reviewed and are negative.      Objective   /73 (BP Location: Right arm, BP Cuff Size: Adult)   Pulse 90   Ht 1.549 m (5' 1\")   Wt 57.5 kg (126 lb 12.8 oz)   SpO2 95%   BMI 23.96 kg/m²    Physical Exam  Vitals and nursing note reviewed.   Constitutional:       General: She is not in acute distress.     Appearance: Normal appearance.   HENT:      Head: Normocephalic and atraumatic.      Right Ear: " Tympanic membrane, ear canal and external ear normal.      Left Ear: Tympanic membrane, ear canal and external ear normal.      Nose: Nose normal.      Mouth/Throat:      Mouth: Mucous membranes are moist.      Pharynx: Oropharynx is clear. No oropharyngeal exudate or posterior oropharyngeal erythema.   Eyes:      Extraocular Movements: Extraocular movements intact.      Conjunctiva/sclera: Conjunctivae normal.      Pupils: Pupils are equal, round, and reactive to light.   Cardiovascular:      Rate and Rhythm: Normal rate and regular rhythm.      Pulses: Normal pulses.      Heart sounds: Normal heart sounds. No murmur heard.  Pulmonary:      Effort: Pulmonary effort is normal. No respiratory distress.      Breath sounds: Normal breath sounds. No wheezing or rales.   Abdominal:      General: Abdomen is flat. Bowel sounds are normal. There is no distension.      Palpations: Abdomen is soft. There is no mass.      Tenderness: There is no abdominal tenderness.   Musculoskeletal:         General: No swelling or deformity. Normal range of motion.      Cervical back: Normal range of motion and neck supple.      Right lower leg: No edema.      Left lower leg: No edema.   Lymphadenopathy:      Cervical: No cervical adenopathy.   Skin:     General: Skin is warm and dry.      Capillary Refill: Capillary refill takes less than 2 seconds.      Findings: No lesion or rash.   Neurological:      General: No focal deficit present.      Mental Status: She is alert and oriented to person, place, and time.      Cranial Nerves: No cranial nerve deficit.      Motor: No weakness.   Psychiatric:         Mood and Affect: Mood normal.         Behavior: Behavior normal.         Thought Content: Thought content normal.         Judgment: Judgment normal.         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  Stay current with age appropriate health maintenance as instructed.  Appointment here or ER with new or worsening symptoms'  Keep appropriate follow-up visit.  Stay current with proper immunizations   Report suicidal ideation  Report psychotic or manic symptoms  3-month recheck  Testing as above  Discussed at length with patient and daughter  Keep specialist follow-up

## 2024-08-20 ENCOUNTER — APPOINTMENT (OUTPATIENT)
Dept: CARDIOLOGY | Facility: HOSPITAL | Age: 84
DRG: 690 | End: 2024-08-20
Payer: MEDICARE

## 2024-08-20 ENCOUNTER — APPOINTMENT (OUTPATIENT)
Dept: RADIOLOGY | Facility: HOSPITAL | Age: 84
DRG: 690 | End: 2024-08-20
Payer: MEDICARE

## 2024-08-20 ENCOUNTER — APPOINTMENT (OUTPATIENT)
Dept: PRIMARY CARE | Facility: CLINIC | Age: 84
End: 2024-08-20
Payer: MEDICARE

## 2024-08-20 ENCOUNTER — HOSPITAL ENCOUNTER (INPATIENT)
Facility: HOSPITAL | Age: 84
LOS: 1 days | Discharge: NURSING, REHAB OR LONG-TERM CARE | DRG: 690 | End: 2024-08-22
Attending: STUDENT IN AN ORGANIZED HEALTH CARE EDUCATION/TRAINING PROGRAM | Admitting: INTERNAL MEDICINE
Payer: MEDICARE

## 2024-08-20 DIAGNOSIS — N30.01 ACUTE CYSTITIS WITH HEMATURIA: Primary | ICD-10-CM

## 2024-08-20 DIAGNOSIS — K92.1 MELENA: ICD-10-CM

## 2024-08-20 DIAGNOSIS — Z87.19 HISTORY OF GASTRITIS: ICD-10-CM

## 2024-08-20 DIAGNOSIS — R79.89 ELEVATED TROPONIN: ICD-10-CM

## 2024-08-20 DIAGNOSIS — D64.9 ANEMIA, UNSPECIFIED TYPE: ICD-10-CM

## 2024-08-20 DIAGNOSIS — K92.2 GASTROINTESTINAL HEMORRHAGE, UNSPECIFIED GASTROINTESTINAL HEMORRHAGE TYPE: ICD-10-CM

## 2024-08-20 PROBLEM — N18.31 ACUTE RENAL FAILURE SUPERIMPOSED ON STAGE 3A CHRONIC KIDNEY DISEASE (MULTI): Status: ACTIVE | Noted: 2024-08-20

## 2024-08-20 PROBLEM — N17.9 ACUTE RENAL FAILURE SUPERIMPOSED ON STAGE 3A CHRONIC KIDNEY DISEASE (MULTI): Status: ACTIVE | Noted: 2024-08-20

## 2024-08-20 PROBLEM — N30.00 ACUTE CYSTITIS WITHOUT HEMATURIA: Status: ACTIVE | Noted: 2024-08-20

## 2024-08-20 PROBLEM — R53.1 GENERALIZED WEAKNESS: Status: ACTIVE | Noted: 2024-08-20

## 2024-08-20 LAB
ABO GROUP (TYPE) IN BLOOD: NORMAL
ALBUMIN SERPL BCP-MCNC: 3.7 G/DL (ref 3.4–5)
ALP SERPL-CCNC: 60 U/L (ref 33–136)
ALT SERPL W P-5'-P-CCNC: 10 U/L (ref 7–45)
ANION GAP SERPL CALC-SCNC: 20 MMOL/L (ref 10–20)
ANTIBODY SCREEN: NORMAL
APPEARANCE UR: ABNORMAL
APTT PPP: 27 SECONDS (ref 27–38)
AST SERPL W P-5'-P-CCNC: 19 U/L (ref 9–39)
BACTERIA #/AREA URNS AUTO: ABNORMAL /HPF
BASOPHILS # BLD AUTO: 0.07 X10*3/UL (ref 0–0.1)
BASOPHILS NFR BLD AUTO: 0.5 %
BILIRUB SERPL-MCNC: 0.4 MG/DL (ref 0–1.2)
BILIRUB UR STRIP.AUTO-MCNC: NEGATIVE MG/DL
BUN SERPL-MCNC: 52 MG/DL (ref 6–23)
CALCIUM SERPL-MCNC: 9.4 MG/DL (ref 8.6–10.3)
CARDIAC TROPONIN I PNL SERPL HS: 72 NG/L (ref 0–13)
CARDIAC TROPONIN I PNL SERPL HS: 73 NG/L (ref 0–13)
CHLORIDE SERPL-SCNC: 100 MMOL/L (ref 98–107)
CO2 SERPL-SCNC: 18 MMOL/L (ref 21–32)
COLOR UR: YELLOW
CREAT SERPL-MCNC: 2.08 MG/DL (ref 0.5–1.05)
EGFRCR SERPLBLD CKD-EPI 2021: 23 ML/MIN/1.73M*2
EOSINOPHIL # BLD AUTO: 0 X10*3/UL (ref 0–0.4)
EOSINOPHIL NFR BLD AUTO: 0 %
ERYTHROCYTE [DISTWIDTH] IN BLOOD BY AUTOMATED COUNT: 16.9 % (ref 11.5–14.5)
GLUCOSE SERPL-MCNC: 74 MG/DL (ref 74–99)
GLUCOSE UR STRIP.AUTO-MCNC: NORMAL MG/DL
HCT VFR BLD AUTO: 34.4 % (ref 36–46)
HGB BLD-MCNC: 11.1 G/DL (ref 12–16)
IMM GRANULOCYTES # BLD AUTO: 0.11 X10*3/UL (ref 0–0.5)
IMM GRANULOCYTES NFR BLD AUTO: 0.9 % (ref 0–0.9)
INR PPP: 1.2 (ref 0.9–1.1)
KETONES UR STRIP.AUTO-MCNC: NEGATIVE MG/DL
LEUKOCYTE ESTERASE UR QL STRIP.AUTO: ABNORMAL
LIPASE SERPL-CCNC: 43 U/L (ref 9–82)
LYMPHOCYTES # BLD AUTO: 0.74 X10*3/UL (ref 0.8–3)
LYMPHOCYTES NFR BLD AUTO: 5.7 %
MCH RBC QN AUTO: 30.3 PG (ref 26–34)
MCHC RBC AUTO-ENTMCNC: 32.3 G/DL (ref 32–36)
MCV RBC AUTO: 94 FL (ref 80–100)
MONOCYTES # BLD AUTO: 1.3 X10*3/UL (ref 0.05–0.8)
MONOCYTES NFR BLD AUTO: 10.1 %
MUCOUS THREADS #/AREA URNS AUTO: ABNORMAL /LPF
NEUTROPHILS # BLD AUTO: 10.68 X10*3/UL (ref 1.6–5.5)
NEUTROPHILS NFR BLD AUTO: 82.8 %
NITRITE UR QL STRIP.AUTO: ABNORMAL
NRBC BLD-RTO: 0 /100 WBCS (ref 0–0)
PH UR STRIP.AUTO: 5.5 [PH]
PLATELET # BLD AUTO: 204 X10*3/UL (ref 150–450)
POTASSIUM SERPL-SCNC: 5 MMOL/L (ref 3.5–5.3)
PROT SERPL-MCNC: 7.3 G/DL (ref 6.4–8.2)
PROT UR STRIP.AUTO-MCNC: ABNORMAL MG/DL
PROTHROMBIN TIME: 14 SECONDS (ref 9.8–12.8)
RBC # BLD AUTO: 3.66 X10*6/UL (ref 4–5.2)
RBC # UR STRIP.AUTO: ABNORMAL /UL
RBC #/AREA URNS AUTO: ABNORMAL /HPF
RH FACTOR (ANTIGEN D): NORMAL
SARS-COV-2 RNA RESP QL NAA+PROBE: NOT DETECTED
SODIUM SERPL-SCNC: 133 MMOL/L (ref 136–145)
SP GR UR STRIP.AUTO: 1.02
SQUAMOUS #/AREA URNS AUTO: ABNORMAL /HPF
UROBILINOGEN UR STRIP.AUTO-MCNC: NORMAL MG/DL
WBC # BLD AUTO: 12.9 X10*3/UL (ref 4.4–11.3)
WBC #/AREA URNS AUTO: >50 /HPF
WBC CLUMPS #/AREA URNS AUTO: ABNORMAL /HPF

## 2024-08-20 PROCEDURE — 87635 SARS-COV-2 COVID-19 AMP PRB: CPT | Performed by: STUDENT IN AN ORGANIZED HEALTH CARE EDUCATION/TRAINING PROGRAM

## 2024-08-20 PROCEDURE — G0378 HOSPITAL OBSERVATION PER HR: HCPCS

## 2024-08-20 PROCEDURE — 96361 HYDRATE IV INFUSION ADD-ON: CPT

## 2024-08-20 PROCEDURE — 84484 ASSAY OF TROPONIN QUANT: CPT | Performed by: STUDENT IN AN ORGANIZED HEALTH CARE EDUCATION/TRAINING PROGRAM

## 2024-08-20 PROCEDURE — 74176 CT ABD & PELVIS W/O CONTRAST: CPT | Performed by: RADIOLOGY

## 2024-08-20 PROCEDURE — 2500000004 HC RX 250 GENERAL PHARMACY W/ HCPCS (ALT 636 FOR OP/ED): Performed by: STUDENT IN AN ORGANIZED HEALTH CARE EDUCATION/TRAINING PROGRAM

## 2024-08-20 PROCEDURE — 93005 ELECTROCARDIOGRAM TRACING: CPT

## 2024-08-20 PROCEDURE — 81001 URINALYSIS AUTO W/SCOPE: CPT | Performed by: STUDENT IN AN ORGANIZED HEALTH CARE EDUCATION/TRAINING PROGRAM

## 2024-08-20 PROCEDURE — 80053 COMPREHEN METABOLIC PANEL: CPT | Performed by: STUDENT IN AN ORGANIZED HEALTH CARE EDUCATION/TRAINING PROGRAM

## 2024-08-20 PROCEDURE — 99285 EMERGENCY DEPT VISIT HI MDM: CPT | Mod: 25

## 2024-08-20 PROCEDURE — 36415 COLL VENOUS BLD VENIPUNCTURE: CPT | Performed by: STUDENT IN AN ORGANIZED HEALTH CARE EDUCATION/TRAINING PROGRAM

## 2024-08-20 PROCEDURE — 83690 ASSAY OF LIPASE: CPT | Performed by: STUDENT IN AN ORGANIZED HEALTH CARE EDUCATION/TRAINING PROGRAM

## 2024-08-20 PROCEDURE — 96365 THER/PROPH/DIAG IV INF INIT: CPT

## 2024-08-20 PROCEDURE — 86901 BLOOD TYPING SEROLOGIC RH(D): CPT | Performed by: STUDENT IN AN ORGANIZED HEALTH CARE EDUCATION/TRAINING PROGRAM

## 2024-08-20 PROCEDURE — 87186 SC STD MICRODIL/AGAR DIL: CPT | Mod: ELYLAB | Performed by: STUDENT IN AN ORGANIZED HEALTH CARE EDUCATION/TRAINING PROGRAM

## 2024-08-20 PROCEDURE — 71045 X-RAY EXAM CHEST 1 VIEW: CPT | Performed by: RADIOLOGY

## 2024-08-20 PROCEDURE — 2500000004 HC RX 250 GENERAL PHARMACY W/ HCPCS (ALT 636 FOR OP/ED): Performed by: INTERNAL MEDICINE

## 2024-08-20 PROCEDURE — 85610 PROTHROMBIN TIME: CPT | Performed by: STUDENT IN AN ORGANIZED HEALTH CARE EDUCATION/TRAINING PROGRAM

## 2024-08-20 PROCEDURE — 85025 COMPLETE CBC W/AUTO DIFF WBC: CPT | Performed by: STUDENT IN AN ORGANIZED HEALTH CARE EDUCATION/TRAINING PROGRAM

## 2024-08-20 PROCEDURE — 74176 CT ABD & PELVIS W/O CONTRAST: CPT

## 2024-08-20 PROCEDURE — 99223 1ST HOSP IP/OBS HIGH 75: CPT | Performed by: INTERNAL MEDICINE

## 2024-08-20 PROCEDURE — 2500000001 HC RX 250 WO HCPCS SELF ADMINISTERED DRUGS (ALT 637 FOR MEDICARE OP): Performed by: INTERNAL MEDICINE

## 2024-08-20 PROCEDURE — 71045 X-RAY EXAM CHEST 1 VIEW: CPT

## 2024-08-20 RX ORDER — CEFTRIAXONE 1 G/50ML
1 INJECTION, SOLUTION INTRAVENOUS EVERY 24 HOURS
Status: DISCONTINUED | OUTPATIENT
Start: 2024-08-21 | End: 2024-08-22

## 2024-08-20 RX ORDER — CEFTRIAXONE 1 G/50ML
1 INJECTION, SOLUTION INTRAVENOUS ONCE
Status: COMPLETED | OUTPATIENT
Start: 2024-08-20 | End: 2024-08-20

## 2024-08-20 RX ORDER — BUSPIRONE HYDROCHLORIDE 5 MG/1
10 TABLET ORAL 3 TIMES DAILY
Status: DISCONTINUED | OUTPATIENT
Start: 2024-08-20 | End: 2024-08-22 | Stop reason: HOSPADM

## 2024-08-20 RX ORDER — ACETAMINOPHEN 325 MG/1
650 TABLET ORAL EVERY 4 HOURS PRN
Status: DISCONTINUED | OUTPATIENT
Start: 2024-08-20 | End: 2024-08-22 | Stop reason: HOSPADM

## 2024-08-20 RX ORDER — POLYETHYLENE GLYCOL 3350 17 G/17G
17 POWDER, FOR SOLUTION ORAL DAILY PRN
Status: DISCONTINUED | OUTPATIENT
Start: 2024-08-20 | End: 2024-08-22 | Stop reason: HOSPADM

## 2024-08-20 RX ORDER — PANTOPRAZOLE SODIUM 40 MG/10ML
80 INJECTION, POWDER, LYOPHILIZED, FOR SOLUTION INTRAVENOUS ONCE
Status: COMPLETED | OUTPATIENT
Start: 2024-08-20 | End: 2024-08-20

## 2024-08-20 RX ORDER — ROPINIROLE 0.25 MG/1
0.5 TABLET, FILM COATED ORAL NIGHTLY
Status: DISCONTINUED | OUTPATIENT
Start: 2024-08-20 | End: 2024-08-22 | Stop reason: HOSPADM

## 2024-08-20 RX ORDER — ALPRAZOLAM 0.5 MG/1
0.5 TABLET ORAL 3 TIMES DAILY PRN
Status: DISCONTINUED | OUTPATIENT
Start: 2024-08-20 | End: 2024-08-22 | Stop reason: HOSPADM

## 2024-08-20 RX ORDER — ACETAMINOPHEN 160 MG/5ML
650 SOLUTION ORAL EVERY 4 HOURS PRN
Status: DISCONTINUED | OUTPATIENT
Start: 2024-08-20 | End: 2024-08-22 | Stop reason: HOSPADM

## 2024-08-20 RX ORDER — ONDANSETRON HYDROCHLORIDE 2 MG/ML
4 INJECTION, SOLUTION INTRAVENOUS EVERY 8 HOURS PRN
Status: DISCONTINUED | OUTPATIENT
Start: 2024-08-20 | End: 2024-08-22 | Stop reason: HOSPADM

## 2024-08-20 RX ORDER — PANTOPRAZOLE SODIUM 40 MG/10ML
40 INJECTION, POWDER, LYOPHILIZED, FOR SOLUTION INTRAVENOUS 2 TIMES DAILY
Status: DISCONTINUED | OUTPATIENT
Start: 2024-08-21 | End: 2024-08-21

## 2024-08-20 RX ORDER — DONEPEZIL HYDROCHLORIDE 5 MG/1
10 TABLET, FILM COATED ORAL NIGHTLY
Status: DISCONTINUED | OUTPATIENT
Start: 2024-08-20 | End: 2024-08-22 | Stop reason: HOSPADM

## 2024-08-20 RX ORDER — ACETAMINOPHEN 650 MG/1
650 SUPPOSITORY RECTAL EVERY 4 HOURS PRN
Status: DISCONTINUED | OUTPATIENT
Start: 2024-08-20 | End: 2024-08-22 | Stop reason: HOSPADM

## 2024-08-20 RX ORDER — MEMANTINE HYDROCHLORIDE 5 MG/1
10 TABLET ORAL 2 TIMES DAILY
Status: DISCONTINUED | OUTPATIENT
Start: 2024-08-20 | End: 2024-08-21

## 2024-08-20 RX ORDER — ONDANSETRON 4 MG/1
4 TABLET, FILM COATED ORAL EVERY 8 HOURS PRN
Status: DISCONTINUED | OUTPATIENT
Start: 2024-08-20 | End: 2024-08-22 | Stop reason: HOSPADM

## 2024-08-20 RX ORDER — TALC
3 POWDER (GRAM) TOPICAL NIGHTLY PRN
Status: DISCONTINUED | OUTPATIENT
Start: 2024-08-20 | End: 2024-08-22 | Stop reason: HOSPADM

## 2024-08-20 RX ORDER — DEXTROSE, SODIUM CHLORIDE, SODIUM LACTATE, POTASSIUM CHLORIDE, AND CALCIUM CHLORIDE 5; .6; .31; .03; .02 G/100ML; G/100ML; G/100ML; G/100ML; G/100ML
75 INJECTION, SOLUTION INTRAVENOUS CONTINUOUS
Status: DISCONTINUED | OUTPATIENT
Start: 2024-08-20 | End: 2024-08-21

## 2024-08-20 RX ORDER — SERTRALINE HYDROCHLORIDE 50 MG/1
100 TABLET, FILM COATED ORAL 2 TIMES DAILY
Status: DISCONTINUED | OUTPATIENT
Start: 2024-08-20 | End: 2024-08-22 | Stop reason: HOSPADM

## 2024-08-20 RX ORDER — ATORVASTATIN CALCIUM 20 MG/1
40 TABLET, FILM COATED ORAL NIGHTLY
Status: DISCONTINUED | OUTPATIENT
Start: 2024-08-20 | End: 2024-08-22 | Stop reason: HOSPADM

## 2024-08-20 SDOH — SOCIAL STABILITY: SOCIAL INSECURITY: ARE THERE ANY APPARENT SIGNS OF INJURIES/BEHAVIORS THAT COULD BE RELATED TO ABUSE/NEGLECT?: NO

## 2024-08-20 SDOH — SOCIAL STABILITY: SOCIAL INSECURITY: HAS ANYONE EVER THREATENED TO HURT YOUR FAMILY OR YOUR PETS?: NO

## 2024-08-20 SDOH — SOCIAL STABILITY: SOCIAL INSECURITY: HAVE YOU HAD ANY THOUGHTS OF HARMING ANYONE ELSE?: NO

## 2024-08-20 SDOH — SOCIAL STABILITY: SOCIAL INSECURITY: ABUSE: ADULT

## 2024-08-20 SDOH — SOCIAL STABILITY: SOCIAL INSECURITY: DO YOU FEEL ANYONE HAS EXPLOITED OR TAKEN ADVANTAGE OF YOU FINANCIALLY OR OF YOUR PERSONAL PROPERTY?: NO

## 2024-08-20 SDOH — SOCIAL STABILITY: SOCIAL INSECURITY: DO YOU FEEL UNSAFE GOING BACK TO THE PLACE WHERE YOU ARE LIVING?: NO

## 2024-08-20 SDOH — SOCIAL STABILITY: SOCIAL INSECURITY: HAVE YOU HAD THOUGHTS OF HARMING ANYONE ELSE?: NO

## 2024-08-20 SDOH — SOCIAL STABILITY: SOCIAL INSECURITY: WERE YOU ABLE TO COMPLETE ALL THE BEHAVIORAL HEALTH SCREENINGS?: YES

## 2024-08-20 SDOH — SOCIAL STABILITY: SOCIAL INSECURITY: DOES ANYONE TRY TO KEEP YOU FROM HAVING/CONTACTING OTHER FRIENDS OR DOING THINGS OUTSIDE YOUR HOME?: NO

## 2024-08-20 SDOH — SOCIAL STABILITY: SOCIAL INSECURITY: ARE YOU OR HAVE YOU BEEN THREATENED OR ABUSED PHYSICALLY, EMOTIONALLY, OR SEXUALLY BY ANYONE?: NO

## 2024-08-20 ASSESSMENT — PAIN DESCRIPTION - ORIENTATION
ORIENTATION: RIGHT;LEFT
ORIENTATION_2: LOWER

## 2024-08-20 ASSESSMENT — ACTIVITIES OF DAILY LIVING (ADL)
TOILETING: INDEPENDENT
HEARING - LEFT EAR: HEARING AID
DRESSING YOURSELF: INDEPENDENT
HEARING - RIGHT EAR: HEARING AID
ASSISTIVE_DEVICE: WALKER
BATHING: INDEPENDENT
PATIENT'S MEMORY ADEQUATE TO SAFELY COMPLETE DAILY ACTIVITIES?: YES
WALKS IN HOME: INDEPENDENT
JUDGMENT_ADEQUATE_SAFELY_COMPLETE_DAILY_ACTIVITIES: YES
FEEDING YOURSELF: INDEPENDENT
ADEQUATE_TO_COMPLETE_ADL: YES
GROOMING: INDEPENDENT

## 2024-08-20 ASSESSMENT — COGNITIVE AND FUNCTIONAL STATUS - GENERAL
DRESSING REGULAR UPPER BODY CLOTHING: A LITTLE
TURNING FROM BACK TO SIDE WHILE IN FLAT BAD: A LITTLE
PERSONAL GROOMING: A LITTLE
MOVING TO AND FROM BED TO CHAIR: A LITTLE
DAILY ACTIVITIY SCORE: 18
STANDING UP FROM CHAIR USING ARMS: A LITTLE
WALKING IN HOSPITAL ROOM: A LITTLE
TOILETING: A LITTLE
CLIMB 3 TO 5 STEPS WITH RAILING: TOTAL
EATING MEALS: A LITTLE
HELP NEEDED FOR BATHING: A LITTLE
MOVING FROM LYING ON BACK TO SITTING ON SIDE OF FLAT BED WITH BEDRAILS: A LITTLE
MOBILITY SCORE: 16
PATIENT BASELINE BEDBOUND: NO
DRESSING REGULAR LOWER BODY CLOTHING: A LITTLE

## 2024-08-20 ASSESSMENT — PATIENT HEALTH QUESTIONNAIRE - PHQ9
1. LITTLE INTEREST OR PLEASURE IN DOING THINGS: NOT AT ALL
2. FEELING DOWN, DEPRESSED OR HOPELESS: NOT AT ALL
SUM OF ALL RESPONSES TO PHQ9 QUESTIONS 1 & 2: 0

## 2024-08-20 ASSESSMENT — COLUMBIA-SUICIDE SEVERITY RATING SCALE - C-SSRS
6. HAVE YOU EVER DONE ANYTHING, STARTED TO DO ANYTHING, OR PREPARED TO DO ANYTHING TO END YOUR LIFE?: NO
2. HAVE YOU ACTUALLY HAD ANY THOUGHTS OF KILLING YOURSELF?: NO
1. IN THE PAST MONTH, HAVE YOU WISHED YOU WERE DEAD OR WISHED YOU COULD GO TO SLEEP AND NOT WAKE UP?: NO

## 2024-08-20 ASSESSMENT — LIFESTYLE VARIABLES
AUDIT-C TOTAL SCORE: 0
SUBSTANCE_ABUSE_PAST_12_MONTHS: NO
HOW MANY STANDARD DRINKS CONTAINING ALCOHOL DO YOU HAVE ON A TYPICAL DAY: PATIENT DOES NOT DRINK
SKIP TO QUESTIONS 9-10: 1
AUDIT-C TOTAL SCORE: 0
HAVE YOU EVER FELT YOU SHOULD CUT DOWN ON YOUR DRINKING: NO
HOW OFTEN DO YOU HAVE 6 OR MORE DRINKS ON ONE OCCASION: NEVER
EVER HAD A DRINK FIRST THING IN THE MORNING TO STEADY YOUR NERVES TO GET RID OF A HANGOVER: NO
HAVE PEOPLE ANNOYED YOU BY CRITICIZING YOUR DRINKING: NO
EVER FELT BAD OR GUILTY ABOUT YOUR DRINKING: NO
PRESCIPTION_ABUSE_PAST_12_MONTHS: NO
TOTAL SCORE: 0
HOW OFTEN DO YOU HAVE A DRINK CONTAINING ALCOHOL: NEVER

## 2024-08-20 ASSESSMENT — PAIN DESCRIPTION - DESCRIPTORS
DESCRIPTORS_2: ACHING
DESCRIPTORS: ACHING

## 2024-08-20 ASSESSMENT — PAIN SCALES - GENERAL
PAINLEVEL_OUTOF10: 3
PAINLEVEL_OUTOF10: 2

## 2024-08-20 ASSESSMENT — PAIN DESCRIPTION - PAIN TYPE: TYPE: CHRONIC PAIN

## 2024-08-20 ASSESSMENT — PAIN - FUNCTIONAL ASSESSMENT: PAIN_FUNCTIONAL_ASSESSMENT: 0-10

## 2024-08-20 ASSESSMENT — PAIN DESCRIPTION - ONSET: ONSET: ONGOING

## 2024-08-20 ASSESSMENT — PAIN DESCRIPTION - FREQUENCY: FREQUENCY: CONSTANT/CONTINUOUS

## 2024-08-20 ASSESSMENT — PAIN DESCRIPTION - LOCATION
LOCATION: LEG
LOCATION_2: BACK

## 2024-08-20 NOTE — Clinical Note
Patient tolerated procedure well. Appears comfortable with no complaints of pain. VS stable. Arousable prior to transport. Patient transported to 8S via bed. Report called to 8S RN             . Handoff completed

## 2024-08-20 NOTE — TELEPHONE ENCOUNTER
Spoke with Arminda based on provider advise. Arminda stated pt is becoming lethargic and difficult to wake. Advised Arminda to take pt to ED. Arminda stated understanding.

## 2024-08-20 NOTE — Clinical Note
Huddle and Timeout completed together with team. Patient wristband and MICKEY information verified.  Anesthesia safety check completed

## 2024-08-20 NOTE — TELEPHONE ENCOUNTER
Spoke with Arminda about pt. Arminda stated pt's creatinine is very high and kidney function low. Arminda stated pt is having difficulty walking and getting around. Arminda stated she is unsure about a UTI as pt has not been drinking enough to leave a UA sample. Arminda stated pt has appt with Dr Gorman on 10/8/24. Arminda would like to know if creatinine is high enough to go to the ER or if she should wait. Please advise.

## 2024-08-20 NOTE — ED PROVIDER NOTES
HPI   Chief Complaint   Patient presents with    abnormal labs     Sent in by PCP for abnormal labs. States that her stool is black. Weakness in legs and fatigued.        Patient is an 84-year-old female with history of A-fib on aspirin, hyperlipidemia, pretension, CKD, who presents for multiple medical complaints.  Patient has been having black stools for last 4 days.  She was seen by her doctor and her creatinine is elevated and her glucose was elevated as well per her daughters.  No chest pain, shortness breath, fevers, chills, cough, sore throat, vomiting.  Patient has been having some diarrhea that is nonbloody.  Endorses mild abdominal discomfort with urination and lower abdomen.  Patient's daughter collect a sample prior to her being started on Keflex yesterday.              Patient History   Past Medical History:   Diagnosis Date    Abnormal bladder cytology 05/02/2023    Abnormal EKG 05/02/2023    Abnormal vaginal bleeding 05/02/2023    Impacted cerumen, bilateral 01/13/2021    Bilateral impacted cerumen    Other gastritis without bleeding 04/08/2021    Gastritis, bile acid reflux    Personal history of diseases of the skin and subcutaneous tissue 09/16/2019    History of contact dermatitis    Personal history of other diseases of the digestive system 01/13/2021    History of glossitis    Personal history of other diseases of urinary system 04/21/2022    History of hematuria    Personal history of other specified conditions 12/03/2020    History of epistaxis    Tongue lesion 05/02/2023    Unspecified symptoms and signs involving the genitourinary system     UTI symptoms     Past Surgical History:   Procedure Laterality Date    OTHER SURGICAL HISTORY  06/10/2019    Cataract surgery    OTHER SURGICAL HISTORY  03/16/2022    Colonoscopy    OTHER SURGICAL HISTORY  03/16/2022    Hysterectomy    OTHER SURGICAL HISTORY  09/07/2022    Nephroureterectomy     Family History   Problem Relation Name Age of Onset     Diabetes Mother      Colon cancer Father      Hypertension Sister      Other (CARDIAC DISORDER) Sister      Hypertension Brother      Alzheimer's disease Brother      Glaucoma Brother      Dementia Other       Social History     Tobacco Use    Smoking status: Every Day     Current packs/day: 0.50     Types: Cigarettes     Passive exposure: Never    Smokeless tobacco: Never   Vaping Use    Vaping status: Never Used   Substance Use Topics    Alcohol use: Never    Drug use: Never       Physical Exam   ED Triage Vitals [08/20/24 1705]   Temperature Heart Rate Respirations BP   36.2 °C (97.2 °F) 74 18 105/54      Pulse Ox Temp Source Heart Rate Source Patient Position   94 % Temporal Monitor Sitting      BP Location FiO2 (%)     Left arm --       Physical Exam  Constitutional:       General: She is not in acute distress.  HENT:      Head: Normocephalic.   Eyes:      Extraocular Movements: Extraocular movements intact.      Conjunctiva/sclera: Conjunctivae normal.      Pupils: Pupils are equal, round, and reactive to light.   Cardiovascular:      Rate and Rhythm: Normal rate and regular rhythm.      Pulses: Normal pulses.      Heart sounds: Normal heart sounds.   Pulmonary:      Effort: Pulmonary effort is normal.      Breath sounds: Normal breath sounds.   Abdominal:      General: There is no distension.      Palpations: Abdomen is soft. There is no mass.      Tenderness: There is no abdominal tenderness. There is no guarding.   Genitourinary:     Comments: Rectal exam conducted with nurse chaperone, no external hemorrhoids/fissures/tears.  Darkened residual stool externally.  Hemoccult positive.  No internal masses/fluctuance.  Musculoskeletal:         General: No deformity.      Cervical back: Normal range of motion and neck supple.      Right lower leg: No edema.      Left lower leg: No edema.   Skin:     General: Skin is warm and dry.      Findings: No lesion or rash.   Neurological:      General: No focal deficit  present.      Mental Status: She is alert and oriented to person, place, and time. Mental status is at baseline.      Cranial Nerves: No cranial nerve deficit.      Sensory: No sensory deficit.      Motor: No weakness.   Psychiatric:         Mood and Affect: Mood normal.           ED Course & MDM   Diagnoses as of 08/20/24 2045   Acute cystitis with hematuria   Gastrointestinal hemorrhage, unspecified gastrointestinal hemorrhage type   Elevated troponin                 No data recorded     [place] Coma Scale Score: 15 (08/20/24 1708 : Brooke A Wilms, RN)                           Medical Decision Making  EKG on my interpretation: Rate 67, rhythm regular, axis normal, , QRS 34, QTc 435, T waves: Inversion in aVL, V4 through V6, lead II and lead I, ST segments: No elevations or depressions, interpretation: Normal sinus rhythm, no STEMI, similar to EKG from the 25th 2024.    EKG on my interpretation: Rate 67, rhythm regular, axis normal, , QRS 74, QTc 454, T waves: Inversion in aVL/lead I/V4 through V6, ST segments: No elevations or depressions, interpretation: Normal sinus rhythm, no STEMI    Patient is an 84-year-old female with above-stated past medical history presents for multiple medical complaints.  Patient's EKGs are nonischemic and similar to her previous, she has a mildly elevated troponins, though they are stable and slightly downtrending.  Low suspicion for ACS.  Lipase is not consistent pancreatitis.  CMP shows a mild elevation in her creatinine versus her value yesterday.  Patient has a leukocytosis to 12.9 and a anemia that is developed since yesterday.  No indication for transfusion at this time.  COVID is negative.  Urinalysis shows signs urinary tract infection.  Patient was started on ceftriaxone.  Chest x-ray did not show signs of pneumonia pneumothorax.  CT scan of the abdomen pelvis showed signs concerning for cystitis.  Patient has no diabetes, therefore I believe ceftriaxone should  be sufficient.  No sign of acute bleeding, though the scan is limited by the lack of contrast due to her GFR.  Patient is clinically well-appearing nontoxic.  I did recommend admission to her and she was amenable to this.  I discussed her case with the medicine service accepted the patient for admission.    Disclaimer: This note was dictated using speech recognition software. Minor errors in transcription may be present. Please call if questions.     Duc Grant MD  [facility]  Contact on [employer]        Problems Addressed:  Acute cystitis with hematuria: acute illness or injury  Elevated troponin: acute illness or injury  Gastrointestinal hemorrhage, unspecified gastrointestinal hemorrhage type: acute illness or injury    Amount and/or Complexity of Data Reviewed  Labs: ordered.  Radiology: ordered.  ECG/medicine tests: ordered and independent interpretation performed.        Procedure  Procedures     Duc Grant MD  08/20/24 2425

## 2024-08-21 ENCOUNTER — ANESTHESIA (OUTPATIENT)
Dept: GASTROENTEROLOGY | Facility: HOSPITAL | Age: 84
End: 2024-08-21
Payer: MEDICARE

## 2024-08-21 ENCOUNTER — APPOINTMENT (OUTPATIENT)
Dept: GASTROENTEROLOGY | Facility: HOSPITAL | Age: 84
DRG: 690 | End: 2024-08-21
Payer: MEDICARE

## 2024-08-21 ENCOUNTER — ANESTHESIA EVENT (OUTPATIENT)
Dept: GASTROENTEROLOGY | Facility: HOSPITAL | Age: 84
End: 2024-08-21
Payer: MEDICARE

## 2024-08-21 PROBLEM — N30.01 ACUTE CYSTITIS WITH HEMATURIA: Status: ACTIVE | Noted: 2024-08-21

## 2024-08-21 LAB
ANION GAP SERPL CALC-SCNC: 15 MMOL/L (ref 10–20)
BUN SERPL-MCNC: 55 MG/DL (ref 6–23)
CALCIUM SERPL-MCNC: 9.2 MG/DL (ref 8.6–10.3)
CHLORIDE SERPL-SCNC: 103 MMOL/L (ref 98–107)
CO2 SERPL-SCNC: 21 MMOL/L (ref 21–32)
CREAT SERPL-MCNC: 1.9 MG/DL (ref 0.5–1.05)
EGFRCR SERPLBLD CKD-EPI 2021: 26 ML/MIN/1.73M*2
ERYTHROCYTE [DISTWIDTH] IN BLOOD BY AUTOMATED COUNT: 16.1 % (ref 11.5–14.5)
GLUCOSE SERPL-MCNC: 109 MG/DL (ref 74–99)
HCT VFR BLD AUTO: 34.4 % (ref 36–46)
HGB BLD-MCNC: 11.2 G/DL (ref 12–16)
HOLD SPECIMEN: NORMAL
HOLD SPECIMEN: NORMAL
MCH RBC QN AUTO: 30.4 PG (ref 26–34)
MCHC RBC AUTO-ENTMCNC: 32.6 G/DL (ref 32–36)
MCV RBC AUTO: 93 FL (ref 80–100)
NRBC BLD-RTO: 0 /100 WBCS (ref 0–0)
PLATELET # BLD AUTO: 210 X10*3/UL (ref 150–450)
POTASSIUM SERPL-SCNC: 3.6 MMOL/L (ref 3.5–5.3)
RBC # BLD AUTO: 3.69 X10*6/UL (ref 4–5.2)
SODIUM SERPL-SCNC: 135 MMOL/L (ref 136–145)
WBC # BLD AUTO: 9 X10*3/UL (ref 4.4–11.3)

## 2024-08-21 PROCEDURE — 85027 COMPLETE CBC AUTOMATED: CPT | Performed by: INTERNAL MEDICINE

## 2024-08-21 PROCEDURE — 2500000005 HC RX 250 GENERAL PHARMACY W/O HCPCS: Performed by: NURSE ANESTHETIST, CERTIFIED REGISTERED

## 2024-08-21 PROCEDURE — 0753T DGTZ GLS MCRSCP SLD LEVEL IV: CPT | Mod: TC,ELYLAB | Performed by: INTERNAL MEDICINE

## 2024-08-21 PROCEDURE — 99222 1ST HOSP IP/OBS MODERATE 55: CPT | Performed by: NURSE PRACTITIONER

## 2024-08-21 PROCEDURE — 2500000004 HC RX 250 GENERAL PHARMACY W/ HCPCS (ALT 636 FOR OP/ED): Performed by: INTERNAL MEDICINE

## 2024-08-21 PROCEDURE — 97161 PT EVAL LOW COMPLEX 20 MIN: CPT | Mod: GP | Performed by: PHYSICAL THERAPIST

## 2024-08-21 PROCEDURE — 80048 BASIC METABOLIC PNL TOTAL CA: CPT | Performed by: INTERNAL MEDICINE

## 2024-08-21 PROCEDURE — 36415 COLL VENOUS BLD VENIPUNCTURE: CPT | Performed by: INTERNAL MEDICINE

## 2024-08-21 PROCEDURE — 0DB78ZX EXCISION OF STOMACH, PYLORUS, VIA NATURAL OR ARTIFICIAL OPENING ENDOSCOPIC, DIAGNOSTIC: ICD-10-PCS | Performed by: INTERNAL MEDICINE

## 2024-08-21 PROCEDURE — 3700000002 HC GENERAL ANESTHESIA TIME - EACH INCREMENTAL 1 MINUTE

## 2024-08-21 PROCEDURE — 1200000002 HC GENERAL ROOM WITH TELEMETRY DAILY

## 2024-08-21 PROCEDURE — 2500000004 HC RX 250 GENERAL PHARMACY W/ HCPCS (ALT 636 FOR OP/ED): Performed by: NURSE ANESTHETIST, CERTIFIED REGISTERED

## 2024-08-21 PROCEDURE — 3700000001 HC GENERAL ANESTHESIA TIME - INITIAL BASE CHARGE

## 2024-08-21 PROCEDURE — 43239 EGD BIOPSY SINGLE/MULTIPLE: CPT | Performed by: INTERNAL MEDICINE

## 2024-08-21 PROCEDURE — 2500000001 HC RX 250 WO HCPCS SELF ADMINISTERED DRUGS (ALT 637 FOR MEDICARE OP): Performed by: INTERNAL MEDICINE

## 2024-08-21 PROCEDURE — 2500000002 HC RX 250 W HCPCS SELF ADMINISTERED DRUGS (ALT 637 FOR MEDICARE OP, ALT 636 FOR OP/ED): Performed by: INTERNAL MEDICINE

## 2024-08-21 PROCEDURE — 99232 SBSQ HOSP IP/OBS MODERATE 35: CPT | Performed by: INTERNAL MEDICINE

## 2024-08-21 RX ORDER — PROPOFOL 10 MG/ML
INJECTION, EMULSION INTRAVENOUS AS NEEDED
Status: DISCONTINUED | OUTPATIENT
Start: 2024-08-21 | End: 2024-08-21

## 2024-08-21 RX ORDER — PANTOPRAZOLE SODIUM 40 MG/1
40 TABLET, DELAYED RELEASE ORAL
Status: DISCONTINUED | OUTPATIENT
Start: 2024-08-22 | End: 2024-08-22 | Stop reason: HOSPADM

## 2024-08-21 RX ORDER — MEMANTINE HYDROCHLORIDE 5 MG/1
5 TABLET ORAL 2 TIMES DAILY
Status: DISCONTINUED | OUTPATIENT
Start: 2024-08-21 | End: 2024-08-22 | Stop reason: HOSPADM

## 2024-08-21 RX ORDER — LIDOCAINE HYDROCHLORIDE 20 MG/ML
INJECTION, SOLUTION INFILTRATION; PERINEURAL AS NEEDED
Status: DISCONTINUED | OUTPATIENT
Start: 2024-08-21 | End: 2024-08-21

## 2024-08-21 RX ORDER — SODIUM CHLORIDE, SODIUM LACTATE, POTASSIUM CHLORIDE, CALCIUM CHLORIDE 600; 310; 30; 20 MG/100ML; MG/100ML; MG/100ML; MG/100ML
75 INJECTION, SOLUTION INTRAVENOUS CONTINUOUS
Status: DISCONTINUED | OUTPATIENT
Start: 2024-08-21 | End: 2024-08-22

## 2024-08-21 SDOH — HEALTH STABILITY: MENTAL HEALTH: HOW OFTEN DO YOU HAVE A DRINK CONTAINING ALCOHOL?: PATIENT DECLINED

## 2024-08-21 SDOH — HEALTH STABILITY: PHYSICAL HEALTH
ON AVERAGE, HOW MANY DAYS PER WEEK DO YOU ENGAGE IN MODERATE TO STRENUOUS EXERCISE (LIKE A BRISK WALK)?: PATIENT DECLINED

## 2024-08-21 SDOH — SOCIAL STABILITY: SOCIAL NETWORK: HOW OFTEN DO YOU ATTENT MEETINGS OF THE CLUB OR ORGANIZATION YOU BELONG TO?: PATIENT DECLINED

## 2024-08-21 SDOH — ECONOMIC STABILITY: INCOME INSECURITY: HOW HARD IS IT FOR YOU TO PAY FOR THE VERY BASICS LIKE FOOD, HOUSING, MEDICAL CARE, AND HEATING?: PATIENT DECLINED

## 2024-08-21 SDOH — SOCIAL STABILITY: SOCIAL NETWORK: HOW OFTEN DO YOU GET TOGETHER WITH FRIENDS OR RELATIVES?: PATIENT DECLINED

## 2024-08-21 SDOH — ECONOMIC STABILITY: TRANSPORTATION INSECURITY
IN THE PAST 12 MONTHS, HAS LACK OF TRANSPORTATION KEPT YOU FROM MEETINGS, WORK, OR FROM GETTING THINGS NEEDED FOR DAILY LIVING?: PATIENT DECLINED

## 2024-08-21 SDOH — SOCIAL STABILITY: SOCIAL INSECURITY
WITHIN THE LAST YEAR, HAVE YOU BEEN HUMILIATED OR EMOTIONALLY ABUSED IN OTHER WAYS BY YOUR PARTNER OR EX-PARTNER?: PATIENT DECLINED

## 2024-08-21 SDOH — HEALTH STABILITY: MENTAL HEALTH
STRESS IS WHEN SOMEONE FEELS TENSE, NERVOUS, ANXIOUS, OR CAN'T SLEEP AT NIGHT BECAUSE THEIR MIND IS TROUBLED. HOW STRESSED ARE YOU?: PATIENT DECLINED

## 2024-08-21 SDOH — ECONOMIC STABILITY: HOUSING INSECURITY: IN THE PAST 12 MONTHS, HOW MANY TIMES HAVE YOU MOVED WHERE YOU WERE LIVING?: 1

## 2024-08-21 SDOH — SOCIAL STABILITY: SOCIAL NETWORK
DO YOU BELONG TO ANY CLUBS OR ORGANIZATIONS SUCH AS CHURCH GROUPS UNIONS, FRATERNAL OR ATHLETIC GROUPS, OR SCHOOL GROUPS?: PATIENT DECLINED

## 2024-08-21 SDOH — SOCIAL STABILITY: SOCIAL NETWORK: IN A TYPICAL WEEK, HOW MANY TIMES DO YOU TALK ON THE PHONE WITH FAMILY, FRIENDS, OR NEIGHBORS?: PATIENT DECLINED

## 2024-08-21 SDOH — ECONOMIC STABILITY: HOUSING INSECURITY: AT ANY TIME IN THE PAST 12 MONTHS, WERE YOU HOMELESS OR LIVING IN A SHELTER (INCLUDING NOW)?: PATIENT DECLINED

## 2024-08-21 SDOH — ECONOMIC STABILITY: TRANSPORTATION INSECURITY
IN THE PAST 12 MONTHS, HAS THE LACK OF TRANSPORTATION KEPT YOU FROM MEDICAL APPOINTMENTS OR FROM GETTING MEDICATIONS?: PATIENT DECLINED

## 2024-08-21 SDOH — ECONOMIC STABILITY: FOOD INSECURITY: WITHIN THE PAST 12 MONTHS, THE FOOD YOU BOUGHT JUST DIDN'T LAST AND YOU DIDN'T HAVE MONEY TO GET MORE.: PATIENT DECLINED

## 2024-08-21 SDOH — SOCIAL STABILITY: SOCIAL NETWORK: ARE YOU MARRIED, WIDOWED, DIVORCED, SEPARATED, NEVER MARRIED, OR LIVING WITH A PARTNER?: PATIENT DECLINED

## 2024-08-21 SDOH — ECONOMIC STABILITY: FOOD INSECURITY: WITHIN THE PAST 12 MONTHS, YOU WORRIED THAT YOUR FOOD WOULD RUN OUT BEFORE YOU GOT MONEY TO BUY MORE.: PATIENT DECLINED

## 2024-08-21 SDOH — HEALTH STABILITY: PHYSICAL HEALTH: ON AVERAGE, HOW MANY MINUTES DO YOU ENGAGE IN EXERCISE AT THIS LEVEL?: PATIENT DECLINED

## 2024-08-21 SDOH — SOCIAL STABILITY: SOCIAL INSECURITY
WITHIN THE LAST YEAR, HAVE TO BEEN RAPED OR FORCED TO HAVE ANY KIND OF SEXUAL ACTIVITY BY YOUR PARTNER OR EX-PARTNER?: PATIENT DECLINED

## 2024-08-21 SDOH — HEALTH STABILITY: MENTAL HEALTH
HOW OFTEN DO YOU NEED TO HAVE SOMEONE HELP YOU WHEN YOU READ INSTRUCTIONS, PAMPHLETS, OR OTHER WRITTEN MATERIAL FROM YOUR DOCTOR OR PHARMACY?: PATIENT DECLINES TO RESPOND

## 2024-08-21 SDOH — ECONOMIC STABILITY: INCOME INSECURITY
IN THE PAST 12 MONTHS, HAS THE ELECTRIC, GAS, OIL, OR WATER COMPANY THREATENED TO SHUT OFF SERVICE IN YOUR HOME?: PATIENT DECLINED

## 2024-08-21 SDOH — SOCIAL STABILITY: SOCIAL INSECURITY
WITHIN THE LAST YEAR, HAVE YOU BEEN KICKED, HIT, SLAPPED, OR OTHERWISE PHYSICALLY HURT BY YOUR PARTNER OR EX-PARTNER?: PATIENT DECLINED

## 2024-08-21 SDOH — HEALTH STABILITY: MENTAL HEALTH: HOW OFTEN DO YOU HAVE 6 OR MORE DRINKS ON ONE OCCASION?: PATIENT DECLINED

## 2024-08-21 SDOH — SOCIAL STABILITY: SOCIAL NETWORK: HOW OFTEN DO YOU ATTEND CHURCH OR RELIGIOUS SERVICES?: PATIENT DECLINED

## 2024-08-21 SDOH — HEALTH STABILITY: MENTAL HEALTH: HOW MANY STANDARD DRINKS CONTAINING ALCOHOL DO YOU HAVE ON A TYPICAL DAY?: PATIENT DECLINED

## 2024-08-21 SDOH — ECONOMIC STABILITY: INCOME INSECURITY: IN THE LAST 12 MONTHS, WAS THERE A TIME WHEN YOU WERE NOT ABLE TO PAY THE MORTGAGE OR RENT ON TIME?: PATIENT DECLINED

## 2024-08-21 SDOH — SOCIAL STABILITY: SOCIAL INSECURITY: WITHIN THE LAST YEAR, HAVE YOU BEEN AFRAID OF YOUR PARTNER OR EX-PARTNER?: PATIENT DECLINED

## 2024-08-21 ASSESSMENT — PAIN SCALES - GENERAL
PAINLEVEL_OUTOF10: 0 - NO PAIN
PAINLEVEL_OUTOF10: 5 - MODERATE PAIN
PAINLEVEL_OUTOF10: 9
PAINLEVEL_OUTOF10: 0 - NO PAIN
PAINLEVEL_OUTOF10: 0 - NO PAIN

## 2024-08-21 ASSESSMENT — PAIN SCALES - WONG BAKER
WONGBAKER_NUMERICALRESPONSE: NO HURT
WONGBAKER_NUMERICALRESPONSE: HURTS LITTLE MORE

## 2024-08-21 ASSESSMENT — COGNITIVE AND FUNCTIONAL STATUS - GENERAL
WALKING IN HOSPITAL ROOM: TOTAL
TURNING FROM BACK TO SIDE WHILE IN FLAT BAD: A LITTLE
MOVING TO AND FROM BED TO CHAIR: A LOT
WALKING IN HOSPITAL ROOM: TOTAL
STANDING UP FROM CHAIR USING ARMS: A LOT
MOVING FROM LYING ON BACK TO SITTING ON SIDE OF FLAT BED WITH BEDRAILS: A LITTLE
MOVING TO AND FROM BED TO CHAIR: A LOT
TURNING FROM BACK TO SIDE WHILE IN FLAT BAD: A LITTLE
CLIMB 3 TO 5 STEPS WITH RAILING: TOTAL
STANDING UP FROM CHAIR USING ARMS: A LOT
MOVING TO AND FROM BED TO CHAIR: A LITTLE
CLIMB 3 TO 5 STEPS WITH RAILING: TOTAL
CLIMB 3 TO 5 STEPS WITH RAILING: TOTAL
STANDING UP FROM CHAIR USING ARMS: A LOT
MOBILITY SCORE: 14
WALKING IN HOSPITAL ROOM: TOTAL
TURNING FROM BACK TO SIDE WHILE IN FLAT BAD: A LITTLE
MOBILITY SCORE: 12
DAILY ACTIVITIY SCORE: 24
MOBILITY SCORE: 12
MOVING FROM LYING ON BACK TO SITTING ON SIDE OF FLAT BED WITH BEDRAILS: A LITTLE
DAILY ACTIVITIY SCORE: 24

## 2024-08-21 ASSESSMENT — PAIN DESCRIPTION - LOCATION: LOCATION: KNEE

## 2024-08-21 ASSESSMENT — PAIN SCALES - PAIN ASSESSMENT IN ADVANCED DEMENTIA (PAINAD)
TOTALSCORE: MEDICATION (SEE MAR)
TOTALSCORE: 0
BODYLANGUAGE: RELAXED
FACIALEXPRESSION: SMILING OR INEXPRESSIVE
CONSOLABILITY: NO NEED TO CONSOLE
BREATHING: NORMAL

## 2024-08-21 ASSESSMENT — LIFESTYLE VARIABLES
SKIP TO QUESTIONS 9-10: 0
AUDIT-C TOTAL SCORE: -1

## 2024-08-21 ASSESSMENT — PAIN - FUNCTIONAL ASSESSMENT: PAIN_FUNCTIONAL_ASSESSMENT: 0-10

## 2024-08-21 NOTE — ANESTHESIA PREPROCEDURE EVALUATION
Jodi Nuñez is a 84 y.o. female here for:      Esophagogastroduodenoscopy (EGD)  With * No providers found *  Gastrointestinal hemorrhage, unspecified gastrointestinal hemorrhage type  Anemia, unspecified type  History of gastritis    Lab Results   Component Value Date    HGB 11.2 (L) 08/21/2024    HCT 34.4 (L) 08/21/2024    WBC 9.0 08/21/2024     08/21/2024     (L) 08/21/2024    K 3.6 08/21/2024     08/21/2024    CREATININE 1.90 (H) 08/21/2024    BUN 55 (H) 08/21/2024       Social History     Substance and Sexual Activity   Drug Use Never      Tobacco Use: High Risk (8/20/2024)    Patient History    • Smoking Tobacco Use: Every Day    • Smokeless Tobacco Use: Never    • Passive Exposure: Never      Social History     Substance and Sexual Activity   Alcohol Use Never        Allergies   Allergen Reactions   • Diphenhydramine Hcl Unknown   • Fexofenadine-Pseudoephedrine Other     fast heart beat   • Pseudoephedrine Unknown       Current Outpatient Medications   Medication Instructions   • ALPRAZolam (XANAX) 0.5 mg, oral, 3 times daily PRN   • amLODIPine (NORVASC) 5 mg, oral, Daily   • aspirin 81 mg EC tablet 1 tablet, oral, Daily   • atorvastatin (LIPITOR) 40 mg, oral, Daily   • busPIRone (BUSPAR) 10 mg, oral, 3 times daily   • cephalexin (KEFLEX) 500 mg, oral, 3 times daily   • cholecalciferol (VITAMIN D-3) 50,000 Units, oral, Once Weekly   • donepezil (ARICEPT) 10 mg, oral, Nightly   • losartan (COZAAR) 25 mg, oral, 2 times daily   • Lumigan 0.01 % ophthalmic solution ophthalmic (eye), Daily RT   • melatonin 10 mg capsule oral   • memantine (NAMENDA) 10 mg, oral, 2 times daily   • pantoprazole (PROTONIX) 40 mg, oral, Daily   • rOPINIRole (REQUIP) 0.5 mg, oral, Nightly   • sertraline (ZOLOFT) 100 mg, oral, 2 times daily       Past Medical History:   Diagnosis Date   • Abnormal bladder cytology 05/02/2023   • Abnormal EKG 05/02/2023   • Abnormal vaginal bleeding 05/02/2023   • Impacted cerumen,  bilateral 01/13/2021    Bilateral impacted cerumen   • Other gastritis without bleeding 04/08/2021    Gastritis, bile acid reflux   • Personal history of diseases of the skin and subcutaneous tissue 09/16/2019    History of contact dermatitis   • Personal history of other diseases of the digestive system 01/13/2021    History of glossitis   • Personal history of other diseases of urinary system 04/21/2022    History of hematuria   • Personal history of other specified conditions 12/03/2020    History of epistaxis   • Tongue lesion 05/02/2023   • Unspecified symptoms and signs involving the genitourinary system     UTI symptoms       Past Surgical History:   Procedure Laterality Date   • OTHER SURGICAL HISTORY  06/10/2019    Cataract surgery   • OTHER SURGICAL HISTORY  03/16/2022    Colonoscopy   • OTHER SURGICAL HISTORY  03/16/2022    Hysterectomy   • OTHER SURGICAL HISTORY  09/07/2022    Nephroureterectomy       Family History   Problem Relation Name Age of Onset   • Diabetes Mother     • Colon cancer Father     • Hypertension Sister     • Other (CARDIAC DISORDER) Sister     • Hypertension Brother     • Alzheimer's disease Brother     • Glaucoma Brother     • Dementia Other         Relevant Problems   Cardiac   (+) Coronary artery disease involving native coronary artery of native heart without angina pectoris   (+) Essential hypertension   (+) Mixed hyperlipidemia   (+) Paroxysmal atrial fibrillation (Multi)      Neuro   (+) Generalized anxiety disorder   (+) 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)   (+) Moderate late onset Alzheimer's dementia without behavioral disturbance, psychotic disturbance, mood disturbance, or anxiety (Multi)      GI   (+) Gastrointestinal hemorrhage, unspecified gastrointestinal hemorrhage type      /Renal   (+) Acute cystitis without hematuria   (+) Bilateral renal stones   (+) 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)   (+) Urinary tract infection     "  Liver   (+) Gallstones      HEENT   (+) Glaucoma of both eyes      ID   (+) Onychomycosis   (+) Urinary tract infection       Visit Vitals  /69 (BP Location: Right arm, Patient Position: Lying)   Pulse 65   Temp 35.5 °C (95.9 °F) (Temporal)   Resp 18   Ht 1.549 m (5' 1\")   Wt 57.5 kg (126 lb 12.2 oz)   SpO2 91%   BMI 23.95 kg/m²   Smoking Status Every Day   BSA 1.57 m²       NPO Details:  NPO/Void Status  Date of Last Liquid: 08/20/24  Date of Last Solid: 08/20/24        Physical Exam    Airway  Mallampati: II     Cardiovascular - normal exam     Dental   (+) upper dentures, lower dentures     Pulmonary - normal exam     Abdominal - normal exam  Abdomen: soft           Anesthesia Plan    History of general anesthesia?: yes  History of complications of general anesthesia?: no    ASA 3     MAC     intravenous induction   Anesthetic plan and risks discussed with patient.    Plan discussed with CRNA.  "

## 2024-08-21 NOTE — CARE PLAN
Problem: Fall/Injury  Goal: Not fall by end of shift  8/21/2024 0026 by Rosario Candelario RN  Outcome: Progressing  8/21/2024 0026 by Rosario Candelario RN  Outcome: Progressing  Goal: Be free from injury by end of the shift  8/21/2024 0026 by Rosario Candelario RN  Outcome: Progressing  8/21/2024 0026 by Rosario Candelario RN  Outcome: Progressing     Problem: Pain - Adult  Goal: Verbalizes/displays adequate comfort level or baseline comfort level  8/21/2024 0026 by Rosario Candelario RN  Outcome: Progressing  8/21/2024 0026 by Rosario Candelario RN  Outcome: Progressing     Problem: Safety - Adult  Goal: Free from fall injury  8/21/2024 0026 by Rosario Candelario RN  Outcome: Progressing  8/21/2024 0026 by Rosario Candelario RN  Outcome: Progressing     Problem: Discharge Planning  Goal: Discharge to home or other facility with appropriate resources  8/21/2024 0026 by Rosario Candelario RN  Outcome: Progressing  8/21/2024 0026 by Rosario Candelario RN  Outcome: Progressing     Problem: Chronic Conditions and Co-morbidities  Goal: Patient's chronic conditions and co-morbidity symptoms are monitored and maintained or improved  8/21/2024 0026 by Rosario Candelario RN  Outcome: Progressing  8/21/2024 0026 by Rosario Candelario RN  Outcome: Progressing     Problem: Skin  Goal: Prevent/minimize sheer/friction injuries  Outcome: Progressing  Goal: Promote/optimize nutrition  Outcome: Progressing   The patient's goals for the shift include rest    The clinical goals for the shift include to remain hemodynamcially stable

## 2024-08-21 NOTE — PROGRESS NOTES
Physical Therapy    Physical Therapy Evaluation    Patient Name: Jodi Nuñez  MRN: [id number]  Today's Date: 8/21/2024   Time Calculation  Start Time: 1332  Stop Time: 1346  Time Calculation (min): 14 min    Assessment/Plan   PT Assessment  PT Assessment Results: Decreased strength, Decreased endurance, Impaired balance, Decreased mobility, Impaired judgement, Decreased safety awareness, Impaired hearing  Rehab Prognosis: Good  Barriers to Discharge: Dementia, limited mobility  Evaluation/Treatment Tolerance: Patient limited by fatigue  End of Session Communication: Bedside nurse  Assessment Comment: Patient will benefit from addiional PT to incrase activity tolerance and mobility.  End of Session Patient Position: Bed, 2 rail up, Alarm off, not on at start of session  IP OR SWING BED PT PLAN  Inpatient or Swing Bed: Inpatient  PT Plan  Treatment/Interventions: Bed mobility, Transfer training, Gait training, Balance training, Strengthening, Therapeutic exercise, Therapeutic activity  PT Frequency: 3 times per week  PT Discharge Recommendations:  (Contineud PT at moderate intensity adn moderate fequency in 24/7 setting. with continued PT)  PT Recommended Transfer Status: Assist x1  PT - OK to Discharge: (once deemed medically appropripriate wth continued P Tin 24/7 setting.)      Subjective   General Visit Information:  General  Reason for Referral: Impaired mobility  Referred By: PT/OT 8/20/24 [place]  Past Medical History Relevant to Rehab: HTN, Arthritis, Dementia, CKD, Anxiety, A Fib, CAD, RLS, Essential tremor,Ataxia, + smoke, Glaucoma, Right nephrectomy,  Missed Visit: Yes  Missed Visit Reason:  (Patient off floor for EGD.To reattempt as time permits)  Prior to Session Communication: PCT/NA/CTA  Patient Position Received:  (Patient sitting up on BSC.)  General Comment: To ED 8/20/24 for increased Scr and blood gucose. C/o abdominal discomft and black stools. Acute cystitis, with hematuria. EGD 8/21/24 ; [address]  (flat); C19 (-); + UA; CT 8/2/0/24 Abdomen cystitis; Scr 2.08  Home Living:  Home Living  Home Living Comments: Per patient report resides with daughter in 1 story home 3 step swtih hndrail to enter.  Prior Level of Function:  Prior Function Per Pt/Caregiver Report  Prior Function Comments: Per pateint report independent in mobility, ADLS and IADLs with ww. Questionable historian. Unable to recall any falls. Does not drive, nor doesdaughter use friends or Uber. (  Precautions:  Precautions  Hearing/Visual Limitations: glassses, [place]  Medical Precautions: Fall precautions  Vital Signs:       Objective   Pain:  Pain Assessment  0-10 (Numeric) Pain Score: 5 - Moderate pain  Pain Type: Chronic pain  Pain Location:  (back/leg)  Cognition:  Cognition  Overall Cognitive Status:  (Confused)  Orientation Level: Oriented X4  Processing Speed: Delayed    General Assessments:  General Observation  General Observation: Patient sitting up on BSC. Agreeable to PT.       Activity Tolerance  Endurance:  (Poor)    Static Sitting Balance  Static Sitting-Fair +  Dynamic Sitting Balance  Dynamic Sitting-: Fair -    Static Standing Balance  Static Standing-: Fair  Dynamic Standing Balance  Dynamic Standing- Poor  Functional Assessments:  Bed Mobility  Bed Mobility:  (Sit > supine supervised. Patient able to lift LEs up into bed and perform bridge to move LEs over. and sraighten out.)    Transfers  Transfer:  (Sit > stand at BSC with feet blocked + 1 moderate assist. Difficutly shifting COG over JULIO. Assist to pull up pants. Sit < L stand at bed level + 1 moderate assist, increased difficiutly initiating stand due to position of be and no rail)    Ambulation/Gait Training  Ambulation/Gait Training Performed:  (Patient ambulated 3'with ww to bed form [facility], extremely retropulsive Poor foot clearance. + 1 moderate assist. Patient ambulated side ways toward HOB + 1 moderate assistwith oor foot clearance. VC to  feet wtih poor follow  through. Retropulsive.)  Extremity/Trunk Assessments:  RUE   RUE : Within Functional Limits (MMT 4/5 grossly difficulty following directions for MMT)  LUE   LUE: Within Functional Limits (MMT 4/5 grossly difficulty following directions for MMT)  RLE   RLE :  (AROM Hip/knee/ankle WFL MMT 3+/5 grossly)  LLE   LLE :  (AROM Hip/knee/ankle WFL MMT 3+/5 grossly)  Outcome Measures:  [facility] Basic Mobility  Turning from your back to your side while in a flat bed without using bedrails: A little  Moving from lying on your back to sitting on the side of a flat bed without using bedrails: A little  Moving to and from bed to chair (including a wheelchair): A lot  Standing up from a chair using your arms (e.g. wheelchair or bedside chair): A lot  To walk in hospital room: Total  Climbing 3-5 steps with railing: Total  Basic Mobility - Total Score: 12    Encounter Problems       Encounter Problems (Active)       PT Problem       Transfers sit <> stand/ bed <> chair with ww CGA (Progressing)       Start:  08/21/24    Expected End:  09/04/24            Patient to ambulate with ww 20' CGA (Progressing)       Start:  08/21/24    Expected End:  09/04/24            Patient to perform multilevel reach with single UE support > 2 inches out of JULIO CGA (Not Progressing)       Start:  08/21/24    Expected End:  09/04/24               Pain - Adult              Education Documentation  Mobility Training, taught by Summer Menon, PT at 8/21/2024  2:49 PM.  Learner: Patient  Readiness: Acceptance  Method: Demonstration  Response: Needs Reinforcement

## 2024-08-21 NOTE — PROGRESS NOTES
Physical Therapy                 Therapy Communication Note    Patient Name: Jodi Nuñez  MRN: [id number]  Today's Date: 8/21/2024 1048    Discipline: Physical Therapy    Missed Visit Reason: Missed Visit Reason:  (Patient off floor for EGD.To reattempt as time permits)

## 2024-08-21 NOTE — PROGRESS NOTES
08/21/24 1610   Current Planned Discharge Disposition   Current Planned Discharge Disposition SNF  ([facility])     Per [facility], PT/OT recommending SNF for pt. SW met with pt and family. Pt's family states if SNF is needed that [facility] is preferred as pt has been there previously. SW built and sent referral to facility in [facility]. SW to follow pending acceptance.

## 2024-08-21 NOTE — CARE PLAN
The patient's goals for the shift include rest    The clinical goals for the shift include to remain hemodynamcially stable    Over the shift, the patient did not make progress toward the following goals. Barriers to progression include testing. Recommendations to address these barriers include completion of test.

## 2024-08-21 NOTE — H&P
History Of Present Illness  Jodi uNñez is a 84 y.o. female presenting with generalized weakness and black stools for 3 to 4 days.  Patient reported feeling fatigued, difficult to ambulate without assistant, and poor appetite.  She described her stool as black and loose over the last 3 to 4 days.  She denied fever, chest pain, vomiting or abdominal pain.  She denies any history of GI bleed or GI cancers.  However she has family history of colon cancer.  She is on daily aspirin 81 mg.    ER course: Patient was awake, alert, oriented, in no acute distress.  She was hemodynamically stable.  Labs shows hemoglobin level 11.1 from baseline above 13.  Patient has troponin 73 which is closer to baseline.  Her creatinine was 2.08 slightly worse than baseline that average around 1.5.  Urine analysis shows evidence of urinary tract infection.  CT abdomen/pelvis shows evidence of cystitis, but negative for other pathology.  She has cholelithiasis but no evidence of acute cholecystitis.  Patient was started on IV fluid and IV ceftriaxone in the ER, and admitted to medicine for further management.     Past Medical History  She has a past medical history of Abnormal bladder cytology (05/02/2023), Abnormal EKG (05/02/2023), Abnormal vaginal bleeding (05/02/2023), Impacted cerumen, bilateral (01/13/2021), Other gastritis without bleeding (04/08/2021), Personal history of diseases of the skin and subcutaneous tissue (09/16/2019), Personal history of other diseases of the digestive system (01/13/2021), Personal history of other diseases of urinary system (04/21/2022), Personal history of other specified conditions (12/03/2020), Tongue lesion (05/02/2023), and Unspecified symptoms and signs involving the genitourinary system.    Surgical History  She has a past surgical history that includes Other surgical history (06/10/2019); Other surgical history (03/16/2022); Other surgical history (03/16/2022); and Other surgical history  (09/07/2022).     Social History  She reports that she has been smoking cigarettes. She has never been exposed to tobacco smoke. She has never used smokeless tobacco. She reports that she does not drink alcohol and does not use drugs.    Family History  Family History   Problem Relation Name Age of Onset    Diabetes Mother      Colon cancer Father      Hypertension Sister      Other (CARDIAC DISORDER) Sister      Hypertension Brother      Alzheimer's disease Brother      Glaucoma Brother      Dementia Other          Allergies  Diphenhydramine hcl, Fexofenadine-pseudoephedrine, and Pseudoephedrine    Review of Systems  10/10 points review of system were conducted, negative except as above.    Physical Exam  Constitutional:       Appearance: Normal appearance. She is normal weight. She is not diaphoretic.   HENT:      Head: Normocephalic and atraumatic.      Nose: Nose normal.      Mouth/Throat:      Mouth: Mucous membranes are dry.   Eyes:      General: No scleral icterus.     Extraocular Movements: Extraocular movements intact.      Conjunctiva/sclera: Conjunctivae normal.      Pupils: Pupils are equal, round, and reactive to light.   Neck:      Vascular: No carotid bruit.   Cardiovascular:      Rate and Rhythm: Normal rate and regular rhythm.      Pulses: Normal pulses.      Heart sounds: No murmur heard.  Pulmonary:      Effort: No respiratory distress.      Breath sounds: No stridor. No wheezing, rhonchi or rales.   Chest:      Chest wall: No tenderness.   Abdominal:      General: There is no distension.      Palpations: There is no mass.      Tenderness: There is no abdominal tenderness. There is no right CVA tenderness, left CVA tenderness, guarding or rebound.      Hernia: No hernia is present.   Musculoskeletal:      Cervical back: Normal range of motion and neck supple. No rigidity or tenderness.      Right lower leg: No edema.      Left lower leg: No edema.   Lymphadenopathy:      Cervical: No cervical  "adenopathy.   Skin:     General: Skin is warm and dry.      Findings: No lesion or rash.   Neurological:      General: No focal deficit present.      Mental Status: She is alert and oriented to person, place, and time. Mental status is at baseline.   Psychiatric:         Mood and Affect: Mood normal.         Behavior: Behavior normal.          Last Recorded Vitals  Blood pressure 146/65, pulse 77, temperature 36.2 °C (97.2 °F), temperature source Temporal, resp. rate 18, height 1.549 m (5' 1\"), weight 57.5 kg (126 lb 12.8 oz), SpO2 (!) 93%.    Relevant Results  Scheduled medications  [START ON 8/21/2024] cefTRIAXone, 1 g, intravenous, q24h  [START ON 8/21/2024] pantoprazole, 40 mg, intravenous, BID  pantoprazole, 80 mg, intravenous, Once      Continuous medications  dextrose 5 % and lactated Ringer's, 100 mL/hr      PRN medications  PRN medications: acetaminophen **OR** acetaminophen **OR** acetaminophen, melatonin, ondansetron **OR** ondansetron, polyethylene glycol        Results for orders placed or performed during the hospital encounter of 08/20/24 (from the past 24 hour(s))   CBC and Auto Differential   Result Value Ref Range    WBC 12.9 (H) 4.4 - 11.3 x10*3/uL    nRBC 0.0 0.0 - 0.0 /100 WBCs    RBC 3.66 (L) 4.00 - 5.20 x10*6/uL    Hemoglobin 11.1 (L) 12.0 - 16.0 g/dL    Hematocrit 34.4 (L) 36.0 - 46.0 %    MCV 94 80 - 100 fL    MCH 30.3 26.0 - 34.0 pg    MCHC 32.3 32.0 - 36.0 g/dL    RDW 16.9 (H) 11.5 - 14.5 %    Platelets 204 150 - 450 x10*3/uL    Neutrophils % 82.8 40.0 - 80.0 %    Immature Granulocytes %, Automated 0.9 0.0 - 0.9 %    Lymphocytes % 5.7 13.0 - 44.0 %    Monocytes % 10.1 2.0 - 10.0 %    Eosinophils % 0.0 0.0 - 6.0 %    Basophils % 0.5 0.0 - 2.0 %    Neutrophils Absolute 10.68 (H) 1.60 - 5.50 x10*3/uL    Immature Granulocytes Absolute, Automated 0.11 0.00 - 0.50 x10*3/uL    Lymphocytes Absolute 0.74 (L) 0.80 - 3.00 x10*3/uL    Monocytes Absolute 1.30 (H) 0.05 - 0.80 x10*3/uL    Eosinophils " Absolute 0.00 0.00 - 0.40 x10*3/uL    Basophils Absolute 0.07 0.00 - 0.10 x10*3/uL   Comprehensive metabolic panel   Result Value Ref Range    Glucose 74 74 - 99 mg/dL    Sodium 133 (L) 136 - 145 mmol/L    Potassium 5.0 3.5 - 5.3 mmol/L    Chloride 100 98 - 107 mmol/L    Bicarbonate 18 (L) 21 - 32 mmol/L    Anion Gap 20 10 - 20 mmol/L    Urea Nitrogen 52 (H) 6 - 23 mg/dL    Creatinine 2.08 (H) 0.50 - 1.05 mg/dL    eGFR 23 (L) >60 mL/min/1.73m*2    Calcium 9.4 8.6 - 10.3 mg/dL    Albumin 3.7 3.4 - 5.0 g/dL    Alkaline Phosphatase 60 33 - 136 U/L    Total Protein 7.3 6.4 - 8.2 g/dL    AST 19 9 - 39 U/L    Bilirubin, Total 0.4 0.0 - 1.2 mg/dL    ALT 10 7 - 45 U/L   Type and Screen   Result Value Ref Range    ABO TYPE O     Rh TYPE POS     ANTIBODY SCREEN NEG    Urinalysis with Reflex Culture and Microscopic   Result Value Ref Range    Color, Urine Yellow Light-Yellow, Yellow, Dark-Yellow    Appearance, Urine Turbid (N) Clear    Specific Gravity, Urine 1.019 1.005 - 1.035    pH, Urine 5.5 5.0, 5.5, 6.0, 6.5, 7.0, 7.5, 8.0    Protein, Urine 30 (1+) (A) NEGATIVE, 10 (TRACE), 20 (TRACE) mg/dL    Glucose, Urine Normal Normal mg/dL    Blood, Urine 0.1 (1+) (A) NEGATIVE    Ketones, Urine NEGATIVE NEGATIVE mg/dL    Bilirubin, Urine NEGATIVE NEGATIVE    Urobilinogen, Urine Normal Normal mg/dL    Nitrite, Urine 2+ (A) NEGATIVE    Leukocyte Esterase, Urine 500 Carlitos/µL (A) NEGATIVE   Lipase   Result Value Ref Range    Lipase 43 9 - 82 U/L   Troponin I, High Sensitivity, Initial   Result Value Ref Range    Troponin I, High Sensitivity 73 (HH) 0 - 13 ng/L   Microscopic Only, Urine   Result Value Ref Range    WBC, Urine >50 (A) 1-5, NONE /HPF    WBC Clumps, Urine OCCASIONAL Reference range not established. /HPF    RBC, Urine 11-20 (A) NONE, 1-2, 3-5 /HPF    Squamous Epithelial Cells, Urine 1-9 (SPARSE) Reference range not established. /HPF    Bacteria, Urine 3+ (A) NONE SEEN /HPF    Mucus, Urine FEW Reference range not established.  /LPF   Sars-CoV-2 PCR   Result Value Ref Range    Coronavirus 2019, PCR Not Detected Not Detected   ECG 12 lead   Result Value Ref Range    Ventricular Rate 67 BPM    Atrial Rate 67 BPM    WV Interval 138 ms    QRS Duration 74 ms    QT Interval 412 ms    QTC Calculation(Bazett) 435 ms    P Axis 68 degrees    R Axis 43 degrees    T Axis 174 degrees    QRS Count 11 beats    Q Onset 229 ms    P Onset 160 ms    P Offset 220 ms    T Offset 435 ms    QTC Fredericia 427 ms   Coagulation Screen   Result Value Ref Range    Protime 14.0 (H) 9.8 - 12.8 seconds    INR 1.2 (H) 0.9 - 1.1    aPTT 27 27 - 38 seconds   Troponin, High Sensitivity, 1 Hour   Result Value Ref Range    Troponin I, High Sensitivity 72 (HH) 0 - 13 ng/L   ECG 12 lead   Result Value Ref Range    Ventricular Rate 67 BPM    Atrial Rate 67 BPM    WV Interval 144 ms    QRS Duration 74 ms    QT Interval 430 ms    QTC Calculation(Bazett) 454 ms    P Axis 63 degrees    R Axis 27 degrees    T Axis 129 degrees    QRS Count 11 beats    Q Onset 230 ms    P Onset 158 ms    P Offset 222 ms    T Offset 445 ms    QTC Fredericia 446 ms       CT abdomen pelvis wo IV contrast    Result Date: 8/20/2024  Interpreted By:  Roberto Lee, STUDY: CT ABDOMEN PELVIS WO IV CONTRAST;  8/20/2024 7:48 pm   INDICATION: Signs/Symptoms:black stools, abd pain.   COMPARISON: CT scan of the abdomen pelvis 5/21/2023   ACCESSION NUMBER(S): [id number]   ORDERING CLINICIAN: KIRSTIN SALCIDO   TECHNIQUE: Axial noncontrast CT images of the abdomen and pelvis with coronal and sagittal reconstructed images.   FINDINGS:   LOWER CHEST: Motion artifact limits evaluation of the lung bases. Bibasilar atelectasis and or scarring. Aortic root, dense mitral annular and coronary artery calcifications noted. Moderate-large size hiatal hernia. The   ABDOMEN: Lack of intravenous contrast limits evaluation of vessels and solid organs. LIVER: Within normal limits. BILE DUCTS: Normal caliber. GALLBLADDER:  Cholelithiasis. No wall thickening. PANCREAS: Moderate fatty atrophy of the pancreas. SPLEEN: Within normal limits.  Small accessory splenule noted. ADRENALS: Nodular thickening of the adrenal glands may relate to hyperplasia or adenomatous change.   KIDNEYS, URETERS, URINARY BLADDER:  Status post right nephrectomy. No soft tissue mass is seen within the surgical bed given limitations of noncontrast exam. There are several linear calcific densities in the left renal hans favored to be vascular although small nonobstructing renal calculus is not excluded. Persistent mild fullness of the left collecting system without evidence for discrete obstructing process. No bladder calculi. Bladder is under distended with mild wall thickening. Tiny locule of air is noted in the bladder. Correlate for history of the recent instrumentation. In the absence of such history findings raise concern for infection.   VESSELS:  Diffuse calcific atherosclerosis of the aortoiliac and mesenteric vessels. No aortic aneurysm. RETROPERITONEUM: No pathologically enlarged lymph nodes.   PELVIS:   REPRODUCTIVE ORGANS: Uterus is surgically absent. No adnexal mass.   BOWEL: Moderate-large size hiatal hernia. Stomach is under distended. Visualized loops of bowel are without evidence for obstruction. Diffuse colonic diverticula without evidence for acute diverticulitis. Normal appendix. PERITONEUM: No ascites or free air, no fluid collection. ABDOMINAL WALL: Small ventral hernia contains fat. BONES: Advanced multilevel discogenic degenerative changes of the spine.       Bladder is partially distended with mild wall thickening. Tiny locule of air is noted in the urinary bladder. Correlate for history of any recent instrumentation. In the absence of such history findings raise concern for infectious cystitis. Correlate with urinalysis.   Status post right nephrectomy.   Cholelithiasis.   Diverticulosis without evidence for acute diverticulitis.    Additional findings as noted above.   MACRO: None   Signed by: Roberto Lee 8/20/2024 8:22 PM Dictation workstation:   [id number]    ECG 12 lead    Result Date: 8/20/2024  Normal sinus rhythm ST & T wave abnormality, consider inferolateral ischemia Abnormal ECG When compared with ECG of 25-FEB-2024 11:43, No significant change was found See ED provider note for full interpretation and clinical correlation    ECG 12 lead    Result Date: 8/20/2024  Normal sinus rhythm Left ventricular hypertrophy with repolarization abnormality Abnormal ECG When compared with ECG of 20-AUG-2024 18:07, (unconfirmed) No significant change was found See ED provider note for full interpretation and clinical correlation    XR chest 1 view    Result Date: 8/20/2024  Interpreted By:  Guillermina Rodrigez, STUDY: XR CHEST 1 VIEW;  8/20/2024 6:41 pm   INDICATION: Signs/Symptoms:Chest Pain.   COMPARISON: 02/25/2024   ACCESSION NUMBER(S): [id number]   ORDERING CLINICIAN: KIRSTIN SALCIDO   FINDINGS:     CARDIOMEDIASTINAL SILHOUETTE: Stable enlarged cardiac silhouette.   LUNGS: No pulmonary consolidation, pleural effusion or pneumothorax. Stable diffuse interstitial coarsening suggesting COPD/emphysema.   ABDOMEN: No remarkable upper abdominal findings.   BONES: No acute osseous abnormality.       No acute cardiopulmonary process.   MACRO: None   Signed by: Guillermina Rodrigez 8/20/2024 7:01 PM Dictation workstation:   [id number]       Assessment/Plan   Assessment & Plan  Gastrointestinal hemorrhage, unspecified gastrointestinal hemorrhage type    Generalized weakness    Acute cystitis without hematuria    Acute renal failure superimposed on stage 3a chronic kidney disease (Multi)    Tremor, essential    Restless leg syndrome    Paroxysmal atrial fibrillation (Multi)    Essential hypertension    Generalized anxiety disorder    Gallstones    Glaucoma of both eyes    -Hold aspirin for now.  -Continue with IV fluid hydration.  -N.p.o. after midnight for  possible EGD and colonoscopy tomorrow.  -Pantoprazole 80 mg now then 40 mg twice daily from tomorrow.  -Repeat hemoglobin level.  The goal is to transfuse if hemoglobin level drop below 8.  -Continue with IV antibiotics ceftriaxone especially with cystitis.  -Monitor for sepsis.  -Telemetry monitoring.  -Resume home medication for dementia, restless leg syndrome, and depression.  Patient does have gallstones but no evidence of acute cholecystitis.  -GI consult for GI bleed.  -PT/OT evaluation for generalized weakness.  -SCD for DVT prophylaxis.    Farheen Calixto MD

## 2024-08-21 NOTE — PROGRESS NOTES
EGD today 8/21/24  Impression  The 2nd part of the duodenum appeared normal.  Mild erythematous mucosa in the duodenal bulb  Moderate erythematous mucosa with erosion in the antrum  Performed forceps biopsies in the body of the stomach, incisura and antrum to rule out H. pylori  4 cm hiatal hernia    Recommend sending home on PPI 40 mg daily x8 weeks for possible duodenitis and gastritis.   Discuss outpatient colonoscopy  Resume previous diet  Avoid NSAIDs  Follow up with GI in 2 weeks for pathology results          Plan discussed with Dr. Garcia. GI will sign off

## 2024-08-21 NOTE — PROGRESS NOTES
ASSESSMENT & PLAN:     Gastrointestinal hemorrhage, unspecified gastrointestinal hemorrhage type     Generalized weakness     Acute cystitis without hematuria     Acute renal failure superimposed on stage 3a chronic kidney disease (Multi)     Tremor, essential     Restless leg syndrome     Paroxysmal atrial fibrillation (Multi)     Essential hypertension     Generalized anxiety disorder     Gallstones     Glaucoma of both eyes     -Hold aspirin for now.  -Continue with IV fluid hydration.  -N.p.o. after midnight for possible EGD and colonoscopy tomorrow.  -Pantoprazole 80 mg now then 40 mg twice daily from tomorrow.  -Repeat hemoglobin level.  The goal is to transfuse if hemoglobin level drop below 8.  -Continue with IV antibiotics ceftriaxone especially with cystitis.  -Monitor for sepsis.  -Telemetry monitoring.  -Resume home medication for dementia, restless leg syndrome, and depression.  Patient does have gallstones but no evidence of acute cholecystitis.  -GI consult for GI bleed.  -PT/OT evaluation for generalized weakness.  -SCD for DVT prophylaxis.    8/21/24  -GI following  -Hgb stable, 11.2 today, no further melena since being admitted  -s/p EGD today that showed mild duodenitis, gastritis, had biopsies done, 4cm hiatal hernia  -plan for colonoscopy as outpt  -will dc ASA, was on for primary prevention  -cont PPI daily  -empiric rocephin for UTI, fu ucx  -Scr improving 1.90 today, cont IVF, trend BMP  -PT/OT eval pending  -cont other home meds as ordered    Mukul Valentino MD    SUBJECTIVE     NAEON. Feeling good. No further stools since being here. Denies n/v.,       OBJECTIVE:       Last Recorded Vitals:  Vitals:    08/21/24 1048 08/21/24 1049 08/21/24 1050 08/21/24 1107   BP:    137/65   BP Location:       Patient Position:       Pulse: 66 65 64 62   Resp:    18   Temp:    35.7 °C (96.3 °F)   TempSrc:       SpO2:    94%   Weight:       Height:           Last I/O:  I/O last 3 completed shifts:  In: 1378.3  (24 mL/kg) [P.O.:120; I.V.:258.3 (4.5 mL/kg); IV Piggyback:1000]  Out: 300 (5.2 mL/kg) [Urine:300 (0.1 mL/kg/hr)]  Weight: 57.5 kg     Physical Exam:  GEN: appears stated age, NAD  CV: RRR, no m/r/g, no LE edema  LUNGS: CTAB, no w/r/c  ABD: soft, NT, ND, NBS  SKIN: no rashes  MSK; no gross deformities, normal joints  NEURO: A+Ox3, no FND  PSYCH: appropriate mood, affect    Inpatient Medications:  atorvastatin, 40 mg, oral, Nightly  busPIRone, 10 mg, oral, TID  cefTRIAXone, 1 g, intravenous, q24h  donepezil, 10 mg, oral, Nightly  memantine, 5 mg, oral, BID  pantoprazole, 40 mg, intravenous, BID  rOPINIRole, 0.5 mg, oral, Nightly  sertraline, 100 mg, oral, BID        PRN Medications  PRN medications: acetaminophen **OR** acetaminophen **OR** acetaminophen, ALPRAZolam, melatonin, ondansetron **OR** ondansetron, polyethylene glycol    Continuous Medications:  dextrose 5 % and lactated Ringer's, 75 mL/hr, Last Rate: 75 mL/hr (08/21/24 0842)          LABS AND IMAGING:     Labs:  Results for orders placed or performed during the hospital encounter of 08/20/24 (from the past 24 hour(s))   CBC and Auto Differential   Result Value Ref Range    WBC 12.9 (H) 4.4 - 11.3 x10*3/uL    nRBC 0.0 0.0 - 0.0 /100 WBCs    RBC 3.66 (L) 4.00 - 5.20 x10*6/uL    Hemoglobin 11.1 (L) 12.0 - 16.0 g/dL    Hematocrit 34.4 (L) 36.0 - 46.0 %    MCV 94 80 - 100 fL    MCH 30.3 26.0 - 34.0 pg    MCHC 32.3 32.0 - 36.0 g/dL    RDW 16.9 (H) 11.5 - 14.5 %    Platelets 204 150 - 450 x10*3/uL    Neutrophils % 82.8 40.0 - 80.0 %    Immature Granulocytes %, Automated 0.9 0.0 - 0.9 %    Lymphocytes % 5.7 13.0 - 44.0 %    Monocytes % 10.1 2.0 - 10.0 %    Eosinophils % 0.0 0.0 - 6.0 %    Basophils % 0.5 0.0 - 2.0 %    Neutrophils Absolute 10.68 (H) 1.60 - 5.50 x10*3/uL    Immature Granulocytes Absolute, Automated 0.11 0.00 - 0.50 x10*3/uL    Lymphocytes Absolute 0.74 (L) 0.80 - 3.00 x10*3/uL    Monocytes Absolute 1.30 (H) 0.05 - 0.80 x10*3/uL    Eosinophils  Absolute 0.00 0.00 - 0.40 x10*3/uL    Basophils Absolute 0.07 0.00 - 0.10 x10*3/uL   Comprehensive metabolic panel   Result Value Ref Range    Glucose 74 74 - 99 mg/dL    Sodium 133 (L) 136 - 145 mmol/L    Potassium 5.0 3.5 - 5.3 mmol/L    Chloride 100 98 - 107 mmol/L    Bicarbonate 18 (L) 21 - 32 mmol/L    Anion Gap 20 10 - 20 mmol/L    Urea Nitrogen 52 (H) 6 - 23 mg/dL    Creatinine 2.08 (H) 0.50 - 1.05 mg/dL    eGFR 23 (L) >60 mL/min/1.73m*2    Calcium 9.4 8.6 - 10.3 mg/dL    Albumin 3.7 3.4 - 5.0 g/dL    Alkaline Phosphatase 60 33 - 136 U/L    Total Protein 7.3 6.4 - 8.2 g/dL    AST 19 9 - 39 U/L    Bilirubin, Total 0.4 0.0 - 1.2 mg/dL    ALT 10 7 - 45 U/L   Type and Screen   Result Value Ref Range    ABO TYPE O     Rh TYPE POS     ANTIBODY SCREEN NEG    Urinalysis with Reflex Culture and Microscopic   Result Value Ref Range    Color, Urine Yellow Light-Yellow, Yellow, Dark-Yellow    Appearance, Urine Turbid (N) Clear    Specific Gravity, Urine 1.019 1.005 - 1.035    pH, Urine 5.5 5.0, 5.5, 6.0, 6.5, 7.0, 7.5, 8.0    Protein, Urine 30 (1+) (A) NEGATIVE, 10 (TRACE), 20 (TRACE) mg/dL    Glucose, Urine Normal Normal mg/dL    Blood, Urine 0.1 (1+) (A) NEGATIVE    Ketones, Urine NEGATIVE NEGATIVE mg/dL    Bilirubin, Urine NEGATIVE NEGATIVE    Urobilinogen, Urine Normal Normal mg/dL    Nitrite, Urine 2+ (A) NEGATIVE    Leukocyte Esterase, Urine 500 Carlitos/µL (A) NEGATIVE   Extra Urine Gray Tube   Result Value Ref Range    Extra Tube Hold for add-ons.    Lipase   Result Value Ref Range    Lipase 43 9 - 82 U/L   Troponin I, High Sensitivity, Initial   Result Value Ref Range    Troponin I, High Sensitivity 73 (HH) 0 - 13 ng/L   Microscopic Only, Urine   Result Value Ref Range    WBC, Urine >50 (A) 1-5, NONE /HPF    WBC Clumps, Urine OCCASIONAL Reference range not established. /HPF    RBC, Urine 11-20 (A) NONE, 1-2, 3-5 /HPF    Squamous Epithelial Cells, Urine 1-9 (SPARSE) Reference range not established. /HPF    Bacteria,  Urine 3+ (A) NONE SEEN /HPF    Mucus, Urine FEW Reference range not established. /LPF   Sars-CoV-2 PCR   Result Value Ref Range    Coronavirus 2019, PCR Not Detected Not Detected   ECG 12 lead   Result Value Ref Range    Ventricular Rate 67 BPM    Atrial Rate 67 BPM    MS Interval 138 ms    QRS Duration 74 ms    QT Interval 412 ms    QTC Calculation(Bazett) 435 ms    P Axis 68 degrees    R Axis 43 degrees    T Axis 174 degrees    QRS Count 11 beats    Q Onset 229 ms    P Onset 160 ms    P Offset 220 ms    T Offset 435 ms    QTC Fredericia 427 ms   Coagulation Screen   Result Value Ref Range    Protime 14.0 (H) 9.8 - 12.8 seconds    INR 1.2 (H) 0.9 - 1.1    aPTT 27 27 - 38 seconds   Troponin, High Sensitivity, 1 Hour   Result Value Ref Range    Troponin I, High Sensitivity 72 (HH) 0 - 13 ng/L   ECG 12 lead   Result Value Ref Range    Ventricular Rate 67 BPM    Atrial Rate 67 BPM    MS Interval 144 ms    QRS Duration 74 ms    QT Interval 430 ms    QTC Calculation(Bazett) 454 ms    P Axis 63 degrees    R Axis 27 degrees    T Axis 129 degrees    QRS Count 11 beats    Q Onset 230 ms    P Onset 158 ms    P Offset 222 ms    T Offset 445 ms    QTC Fredericia 446 ms   CBC   Result Value Ref Range    WBC 9.0 4.4 - 11.3 x10*3/uL    nRBC 0.0 0.0 - 0.0 /100 WBCs    RBC 3.69 (L) 4.00 - 5.20 x10*6/uL    Hemoglobin 11.2 (L) 12.0 - 16.0 g/dL    Hematocrit 34.4 (L) 36.0 - 46.0 %    MCV 93 80 - 100 fL    MCH 30.4 26.0 - 34.0 pg    MCHC 32.6 32.0 - 36.0 g/dL    RDW 16.1 (H) 11.5 - 14.5 %    Platelets 210 150 - 450 x10*3/uL   Basic metabolic panel   Result Value Ref Range    Glucose 109 (H) 74 - 99 mg/dL    Sodium 135 (L) 136 - 145 mmol/L    Potassium 3.6 3.5 - 5.3 mmol/L    Chloride 103 98 - 107 mmol/L    Bicarbonate 21 21 - 32 mmol/L    Anion Gap 15 10 - 20 mmol/L    Urea Nitrogen 55 (H) 6 - 23 mg/dL    Creatinine 1.90 (H) 0.50 - 1.05 mg/dL    eGFR 26 (L) >60 mL/min/1.73m*2    Calcium 9.2 8.6 - 10.3 mg/dL   SST TOP   Result Value Ref  Range    Extra Tube Hold for add-ons.         Imaging:  Esophagogastroduodenoscopy (EGD)  Table formatting from the original result was not included.  Impression  The 2nd part of the duodenum appeared normal.  Mild erythematous mucosa in the duodenal bulb  Moderate erythematous mucosa with erosion in the antrum  Performed forceps biopsies in the body of the stomach, incisura and antrum   to rule out H. pylori  4 cm hiatal hernia    Findings  The 2nd part of the duodenum appeared normal.  Mild, patchy erythematous mucosa in the duodenal bulb  Moderate erythematous mucosa with erosion in the antrum. striped  Performed forceps biopsies in the body of the stomach, incisura and antrum   to rule out H. pylori  4 cm hiatal hernia, confirmed by retroflexion. Esophagus otherwise normal.   Hill grade III hiatal hernia    Recommendation  Await pathology results   Follow up with PCP   Return to [facility]  Can do colonoscopy as outpatient       Indication  History of gastritis, Anemia, unspecified type, Gastrointestinal   hemorrhage, unspecified gastrointestinal hemorrhage type    Staff  Staff Role   Clifford Garcia MD Proceduralist     Medications  See Anesthesia Record.     Preprocedure  A history and physical has been performed, and patient medication   allergies have been reviewed. The patient's tolerance of previous   anesthesia has been reviewed. The risks and benefits of the procedure and   the sedation options and risks were discussed with the patient. All   questions were answered and informed consent obtained.    Details of the Procedure  The patient underwent monitored anesthesia care, which was administered by   an anesthesia professional. The patient's blood pressure, ECG, ETCO2,   heart rate, level of consciousness, oxygen and respirations were monitored   throughout the procedure. The scope was introduced through the mouth and   advanced to the second part of the duodenum. Retroflexion was performed in    the cardia. The patient's estimated blood loss was minimal (<5 mL). The   procedure was not difficult. The patient tolerated the procedure well.   There were no apparent adverse events.     Events  Procedure Events   Event Event Time   ENDO SCOPE IN TIME 8/21/2024 10:37 AM   ENDO SCOPE OUT TIME 8/21/2024 10:43 AM     Specimens  ID Type Source Tests Collected by Time   1 : r/o h-pylori Tissue STOMACH ANTRUM BIOPSY SURGICAL PATHOLOGY EXAM   Clifford Garcia MD 8/21/2024 1040     Procedure Location  [facility] Cardiac Intensive Care  [address] [place] [id number]  [phone]    Referring Provider  Lona Jean, APRN-CNP    Procedure Provider  Clifford Garcia MD

## 2024-08-21 NOTE — ANESTHESIA POSTPROCEDURE EVALUATION
Patient: Jodi Nuñez    Procedure Summary       Date: 08/21/24 Room / Location: [facility]    Anesthesia Start: 1032 Anesthesia Stop: 1048    Procedure: EGD Diagnosis:       Gastrointestinal hemorrhage, unspecified gastrointestinal hemorrhage type      Anemia, unspecified type      History of gastritis    Scheduled Providers: Clifford Garcia MD; Leif Forte MD Responsible Provider: Leif Forte MD    Anesthesia Type: MAC ASA Status: 3            Anesthesia Type: MAC    Anesthesia Post Evaluation    Patient location during evaluation: bedside  Patient participation: complete - patient participated  Level of consciousness: awake and alert  Pain management: adequate  Airway patency: patent  Cardiovascular status: acceptable  Respiratory status: acceptable  Hydration status: acceptable  Postoperative Nausea and Vomiting: none      No notable events documented.

## 2024-08-21 NOTE — PROGRESS NOTES
8/21 Patient reports she is  independent,will await PT>OT eval.  Patient will have procedure today and will await findings.

## 2024-08-21 NOTE — CONSULTS
Department of Internal Medicine  Gastroenterology  Consult note      Reason for Consult: GIB    Chief Complaint: weakness and black stools    History Obtained from: patient, prior medical records and medical staff    History of Present Illness      The patient is a 84 y.o. female with signficant past medical history of A-fib on aspirin, hyperlipidemia, HTN, CKD and Dementia who presents generalized weakness and black stools for 3 to 4 days.     The patient is alert and oriented x3 at time of visit, but not the best informant.  Patient reports black tarry stools at home, unsure how many. Also unclear when last BM was. Bedside RN states she was told last stool was ?2days ago. Denies BRBPR or hematemesis.    She denies fever, chest pain, nausea, vomiting or abdominal pain. No regular NSAID use  She denies any history of GI bleed.  However she has family history of colon cancer.  She is on daily aspirin 81 mg.        Allergies  Diphenhydramine hcl, Fexofenadine-pseudoephedrine, and Pseudoephedrine    Current Medication    Current Facility-Administered Medications:     acetaminophen (Tylenol) tablet 650 mg, 650 mg, oral, q4h PRN, 650 mg at 08/20/24 2215 **OR** acetaminophen (Tylenol) oral liquid 650 mg, 650 mg, nasogastric tube, q4h PRN **OR** acetaminophen (Tylenol) suppository 650 mg, 650 mg, rectal, q4h PRN, Farheen Calixto MD    ALPRAZolam (Xanax) tablet 0.5 mg, 0.5 mg, oral, TID PRN, Farheen Calixto MD, 0.5 mg at 08/20/24 2352    atorvastatin (Lipitor) tablet 40 mg, 40 mg, oral, Nightly, Farheen Calixto MD    busPIRone (Buspar) tablet 10 mg, 10 mg, oral, TID, Farheen Calixto MD, 10 mg at 08/21/24 0015    cefTRIAXone (Rocephin) 1 g in dextrose (iso) IV 50 mL, 1 g, intravenous, q24h, Farheen Calixto MD    dextrose 5 % and lactated Ringer's infusion, 100 mL/hr, intravenous, Continuous, Farheen Calixto MD, Last Rate: 100 mL/hr at 08/21/24 0048, 100 mL/hr at 08/21/24 0048    donepezil (Aricept) tablet 10 mg, 10 mg, oral,  Nightly, Farheen Calixto MD, 10 mg at 08/20/24 2352    melatonin tablet 3 mg, 3 mg, oral, Nightly PRN, Farheen Calixto MD    memantine (Namenda) tablet 5 mg, 5 mg, oral, BID, Farheen Calixto MD    ondansetron (Zofran) tablet 4 mg, 4 mg, oral, q8h PRN **OR** ondansetron (Zofran) injection 4 mg, 4 mg, intravenous, q8h PRN, Farheen Calixto MD    pantoprazole (ProtoNix) injection 40 mg, 40 mg, intravenous, BID, Farheen Calixto MD    polyethylene glycol (Glycolax, Miralax) packet 17 g, 17 g, oral, Daily PRN, Farheen Calixto MD    rOPINIRole (Requip) tablet 0.5 mg, 0.5 mg, oral, Nightly, Farheen Calixto MD, 0.5 mg at 08/20/24 2352    sertraline (Zoloft) tablet 100 mg, 100 mg, oral, BID, Farheen Calixto MD, 100 mg at 08/21/24 0015    Past Medical History  Active Ambulatory Problems     Diagnosis Date Noted    Arthritis 05/02/2023    Ataxic gait 05/02/2023    Paroxysmal atrial fibrillation (Multi) 05/02/2023    Chronic kidney disease, stage 3a (Multi) 05/02/2023    Current every day smoker 05/02/2023    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) 05/02/2023    Elevated calcitonin level 05/02/2023    Coronary artery disease involving native coronary artery of native heart without angina pectoris 10/16/2015    Elevated coronary artery calcium score 05/02/2023    Essential hypertension 08/25/2015    Gallstones 05/02/2023    Generalized anxiety disorder 05/02/2023    Glaucoma of both eyes 07/19/2016    Gross hematuria 05/02/2023    Hyperglycemia 01/18/2016    Mixed hyperlipidemia 05/02/2023    Ischemic cerebrovascular disease 05/02/2023    LVH (left ventricular hypertrophy) 05/02/2023    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    Memory loss 05/02/2023    Muscle cramp 05/02/2023    Onychomycosis 05/02/2023    Restless leg syndrome 05/02/2023    Skin nodule 05/02/2023    Tremor, essential 05/02/2023    Urethral stricture 05/02/2023    Vitamin D deficiency 07/16/2015    Abdominal pain 07/31/2023     Constipation 07/31/2023    Tremor 07/31/2023    Urinary tract infection 07/31/2023    Bilateral renal stones 09/12/2023    Skin lesion 09/21/2023    Moderate late onset Alzheimer's dementia without behavioral disturbance, psychotic disturbance, mood disturbance, or anxiety (Multi) 09/21/2023    Ataxia 09/21/2023    Closed fracture of patella, initial encounter 02/25/2024    Fall 03/06/2024    Impaired functional mobility, balance, and endurance 03/06/2024    Visit for monitoring Lovenox therapy 03/06/2024    Chronic renal disease, stage IV (Multi) 03/21/2024     Resolved Ambulatory Problems     Diagnosis Date Noted    Abnormal EKG 05/02/2023    Abnormal bladder cytology 05/02/2023    Abnormal vaginal bleeding 05/02/2023    Acute bronchitis 03/08/2019    Anxiety 09/27/2012    Calculus of kidney 05/02/2023    Hypercholesterolemia 05/02/2023    Tongue lesion 05/02/2023     Past Medical History:   Diagnosis Date    Impacted cerumen, bilateral 01/13/2021    Other gastritis without bleeding 04/08/2021    Personal history of diseases of the skin and subcutaneous tissue 09/16/2019    Personal history of other diseases of the digestive system 01/13/2021    Personal history of other diseases of urinary system 04/21/2022    Personal history of other specified conditions 12/03/2020    Unspecified symptoms and signs involving the genitourinary system        Past Surgical History  Past Surgical History:   Procedure Laterality Date    OTHER SURGICAL HISTORY  06/10/2019    Cataract surgery    OTHER SURGICAL HISTORY  03/16/2022    Colonoscopy    OTHER SURGICAL HISTORY  03/16/2022    Hysterectomy    OTHER SURGICAL HISTORY  09/07/2022    Nephroureterectomy       Family History  Family History   Problem Relation Name Age of Onset    Diabetes Mother      Colon cancer Father      Hypertension Sister      Other (CARDIAC DISORDER) Sister      Hypertension Brother      Alzheimer's disease Brother      Glaucoma Brother      Dementia Other    "    No family history of stomach cancer. But family history of CRC    Social History  TOBACCO:  reports that she has been smoking cigarettes. She has never been exposed to tobacco smoke. She has never used smokeless tobacco.  ETOH:  reports no history of alcohol use.  DRUGS:  reports no history of drug use.  MARITAL STATUS:   OCCUPATION:    Review of Systems  All 10 systems reviewed with the patient/family and negative except for what is mentioned in HPI      PHYSICAL EXAM  VS: /69   Pulse 65   Temp 35.5 °C (95.9 °F)   Resp 18   Ht 1.549 m (5' 1\")   Wt 57.5 kg (126 lb 12.2 oz)   SpO2 91%   BMI 23.95 kg/m²  Body mass index is 23.95 kg/m².  Physical Exam  Vitals reviewed.   Constitutional:       General: She is not in acute distress.     Appearance: Normal appearance.   HENT:      Head: Normocephalic.      Nose: Nose normal.      Mouth/Throat:      Mouth: Mucous membranes are dry.      Pharynx: Oropharynx is clear.   Eyes:      Extraocular Movements: Extraocular movements intact.      Conjunctiva/sclera: Conjunctivae normal.      Pupils: Pupils are equal, round, and reactive to light.   Cardiovascular:      Rate and Rhythm: Normal rate and regular rhythm.      Pulses: Normal pulses.      Heart sounds: Normal heart sounds.   Pulmonary:      Effort: Pulmonary effort is normal.      Breath sounds: Normal breath sounds.   Abdominal:      General: Abdomen is flat. Bowel sounds are normal. There is no distension.      Palpations: Abdomen is soft.      Tenderness: There is no abdominal tenderness. There is no guarding or rebound.   Musculoskeletal:         General: Normal range of motion.      Cervical back: Normal range of motion.   Skin:     General: Skin is warm and dry.   Neurological:      General: No focal deficit present.      Mental Status: She is alert and oriented to person, place, and time.   Psychiatric:         Mood and Affect: Mood normal.         Behavior: Behavior normal.          DATA  Recent " blood work and relevant radiology and endoscopic studies were reviewed and discussed with the patient   Results from last 7 days   Lab Units 08/21/24  0412   WBC AUTO x10*3/uL 9.0   RBC AUTO x10*6/uL 3.69*   HEMOGLOBIN g/dL 11.2*   HEMATOCRIT % 34.4*   MCV fL 93   MCHC g/dL 32.6   RDW % 16.1*   PLATELETS AUTO x10*3/uL 210       Results from last 72 hours   Lab Units 08/21/24  0412 08/20/24  1800   SODIUM mmol/L 135* 133*   POTASSIUM mmol/L 3.6 5.0   CHLORIDE mmol/L 103 100   CO2 mmol/L 21 18*   BUN mg/dL 55* 52*   CREATININE mg/dL 1.90* 2.08*   CALCIUM mg/dL 9.2 9.4   PROTEIN TOTAL g/dL  --  7.3   BILIRUBIN TOTAL mg/dL  --  0.4   ALK PHOS U/L  --  60   AST U/L  --  19   ALT U/L  --  10       Results from last 72 hours   Lab Units 08/20/24  1907   INR  1.2*       Results from last 72 hours   Lab Units 08/20/24  1800   LIPASE U/L 43       RADIOLOGY REVIEW  CT A/P without contrast 8/20/24:  IMPRESSION:  Bladder is partially distended with mild wall thickening. Tiny locule  of air is noted in the urinary bladder. Correlate for history of any  recent instrumentation. In the absence of such history findings raise  concern for infectious cystitis. Correlate with urinalysis.      Status post right nephrectomy.      Cholelithiasis.      Diverticulosis without evidence for acute diverticulitis.    ENDOSCOPIC REVIEW  EGD and colonoscopy 6/8/2017 at [facility] with Dr. Brandt:    EGD results:  -3 cm hiatal hernia found in the stomach  -Gastritis in the antrum.  Biopsied  -Duodenitis in the duodenal bulb    Colonoscopy results:  -Moderately severe diverticulosis in the descending colon and sigmoid colon  -A single sessile polyp in the sigmoid colon removed by snare cautery, polypectomy  -A single flat polyp was found in the descending colon removed by snare cautery, polypectomy.  Saline was injected to raise the polyp prior to removal  -Internal grade 1 hemorrhoids found    Unable to locate pathology results in the  EMR      IMPRESSION/RECOMMENDATIONS  The patient is a 84 y.o. female with signficant past medical history of A-fib on aspirin, hyperlipidemia, HTN, CKD and Dementia who presents generalized weakness and black stools for 3 to 4 days.       #GIB - melena reported  #anemia - HDS. Hgb stable 12.4-11.1-11.2.   #hx of HH, gastritis and duodenitis on EGD 2017  # on ASA      PLAN  -EGD today  -keep NPO prior to procedure  -PPI bolus followed by PPI IV BID  -trend hgb and hct  -monitor consistency and color of stool. Monitor for signs of overt GI bleeding  -tranfuse for hgb < 8.0  -avoid NSAIDs              Plan discussed with Dr. Garcia. GI will continue to follow  (Electronically signed byLORIE Sheridan on 8/21/2024 at 7:43 AM)           Inpatient consult to gastroenterology  Consult performed by: LORIE Sheridan  Consult ordered by: Farheen Calixto MD

## 2024-08-21 NOTE — CARE PLAN
The patient's goals for the shift include rest    The clinical goals for the shift include to remain hemodynamcially stable

## 2024-08-22 VITALS
DIASTOLIC BLOOD PRESSURE: 73 MMHG | SYSTOLIC BLOOD PRESSURE: 160 MMHG | HEIGHT: 61 IN | TEMPERATURE: 97.2 F | WEIGHT: 126.76 LBS | BODY MASS INDEX: 23.93 KG/M2 | OXYGEN SATURATION: 94 % | RESPIRATION RATE: 18 BRPM | HEART RATE: 65 BPM

## 2024-08-22 LAB
ANION GAP SERPL CALC-SCNC: 14 MMOL/L (ref 10–20)
BUN SERPL-MCNC: 37 MG/DL (ref 6–23)
CALCIUM SERPL-MCNC: 9 MG/DL (ref 8.6–10.3)
CHLORIDE SERPL-SCNC: 106 MMOL/L (ref 98–107)
CO2 SERPL-SCNC: 20 MMOL/L (ref 21–32)
CREAT SERPL-MCNC: 1.38 MG/DL (ref 0.5–1.05)
EGFRCR SERPLBLD CKD-EPI 2021: 38 ML/MIN/1.73M*2
ERYTHROCYTE [DISTWIDTH] IN BLOOD BY AUTOMATED COUNT: 15.9 % (ref 11.5–14.5)
GLUCOSE SERPL-MCNC: 110 MG/DL (ref 74–99)
HCT VFR BLD AUTO: 34.7 % (ref 36–46)
HGB BLD-MCNC: 11.4 G/DL (ref 12–16)
MAGNESIUM SERPL-MCNC: 1.81 MG/DL (ref 1.6–2.4)
MCH RBC QN AUTO: 30.4 PG (ref 26–34)
MCHC RBC AUTO-ENTMCNC: 32.9 G/DL (ref 32–36)
MCV RBC AUTO: 93 FL (ref 80–100)
NRBC BLD-RTO: 0 /100 WBCS (ref 0–0)
PLATELET # BLD AUTO: 169 X10*3/UL (ref 150–450)
POTASSIUM SERPL-SCNC: 3.7 MMOL/L (ref 3.5–5.3)
RBC # BLD AUTO: 3.75 X10*6/UL (ref 4–5.2)
SODIUM SERPL-SCNC: 136 MMOL/L (ref 136–145)
WBC # BLD AUTO: 6.9 X10*3/UL (ref 4.4–11.3)

## 2024-08-22 PROCEDURE — 83735 ASSAY OF MAGNESIUM: CPT | Performed by: INTERNAL MEDICINE

## 2024-08-22 PROCEDURE — 82374 ASSAY BLOOD CARBON DIOXIDE: CPT | Performed by: INTERNAL MEDICINE

## 2024-08-22 PROCEDURE — 36415 COLL VENOUS BLD VENIPUNCTURE: CPT | Performed by: INTERNAL MEDICINE

## 2024-08-22 PROCEDURE — 85027 COMPLETE CBC AUTOMATED: CPT | Performed by: INTERNAL MEDICINE

## 2024-08-22 PROCEDURE — 99239 HOSP IP/OBS DSCHRG MGMT >30: CPT | Performed by: INTERNAL MEDICINE

## 2024-08-22 PROCEDURE — 97530 THERAPEUTIC ACTIVITIES: CPT | Mod: GP,CQ

## 2024-08-22 PROCEDURE — 2500000001 HC RX 250 WO HCPCS SELF ADMINISTERED DRUGS (ALT 637 FOR MEDICARE OP): Performed by: INTERNAL MEDICINE

## 2024-08-22 PROCEDURE — 2500000002 HC RX 250 W HCPCS SELF ADMINISTERED DRUGS (ALT 637 FOR MEDICARE OP, ALT 636 FOR OP/ED): Performed by: INTERNAL MEDICINE

## 2024-08-22 PROCEDURE — 97165 OT EVAL LOW COMPLEX 30 MIN: CPT | Mod: GO

## 2024-08-22 PROCEDURE — 2500000004 HC RX 250 GENERAL PHARMACY W/ HCPCS (ALT 636 FOR OP/ED): Performed by: INTERNAL MEDICINE

## 2024-08-22 RX ORDER — CIPROFLOXACIN 250 MG/1
500 TABLET, FILM COATED ORAL ONCE
Status: COMPLETED | OUTPATIENT
Start: 2024-08-22 | End: 2024-08-22

## 2024-08-22 RX ORDER — LOSARTAN POTASSIUM 25 MG/1
25 TABLET ORAL 2 TIMES DAILY
Status: DISCONTINUED | OUTPATIENT
Start: 2024-08-22 | End: 2024-08-22 | Stop reason: HOSPADM

## 2024-08-22 RX ORDER — AMLODIPINE BESYLATE 5 MG/1
5 TABLET ORAL DAILY
Status: DISCONTINUED | OUTPATIENT
Start: 2024-08-22 | End: 2024-08-22 | Stop reason: HOSPADM

## 2024-08-22 ASSESSMENT — COGNITIVE AND FUNCTIONAL STATUS - GENERAL
DAILY ACTIVITIY SCORE: 23
MOVING TO AND FROM BED TO CHAIR: A LITTLE
MOBILITY SCORE: 15
TOILETING: A LITTLE
WALKING IN HOSPITAL ROOM: A LITTLE
DRESSING REGULAR LOWER BODY CLOTHING: A LOT
CLIMB 3 TO 5 STEPS WITH RAILING: TOTAL
STANDING UP FROM CHAIR USING ARMS: A LITTLE
DAILY ACTIVITIY SCORE: 19
EATING MEALS: A LITTLE
TOILETING: A LITTLE
MOBILITY SCORE: 18
CLIMB 3 TO 5 STEPS WITH RAILING: A LOT
DAILY ACTIVITIY SCORE: 15
MOVING TO AND FROM BED TO CHAIR: A LITTLE
DRESSING REGULAR LOWER BODY CLOTHING: A LITTLE
TURNING FROM BACK TO SIDE WHILE IN FLAT BAD: A LOT
MOVING TO AND FROM BED TO CHAIR: A LITTLE
TURNING FROM BACK TO SIDE WHILE IN FLAT BAD: A LOT
STANDING UP FROM CHAIR USING ARMS: A LITTLE
TURNING FROM BACK TO SIDE WHILE IN FLAT BAD: A LITTLE
WALKING IN HOSPITAL ROOM: A LITTLE
HELP NEEDED FOR BATHING: A LOT
WALKING IN HOSPITAL ROOM: A LITTLE
DRESSING REGULAR UPPER BODY CLOTHING: A LITTLE
PERSONAL GROOMING: A LITTLE
PERSONAL GROOMING: A LITTLE
MOVING FROM LYING ON BACK TO SITTING ON SIDE OF FLAT BED WITH BEDRAILS: A LITTLE
DRESSING REGULAR UPPER BODY CLOTHING: A LITTLE
MOBILITY SCORE: 15
CLIMB 3 TO 5 STEPS WITH RAILING: TOTAL
TOILETING: A LOT
STANDING UP FROM CHAIR USING ARMS: A LITTLE
MOVING FROM LYING ON BACK TO SITTING ON SIDE OF FLAT BED WITH BEDRAILS: A LITTLE
HELP NEEDED FOR BATHING: A LITTLE

## 2024-08-22 ASSESSMENT — PAIN SCALES - GENERAL
PAINLEVEL_OUTOF10: 0 - NO PAIN

## 2024-08-22 ASSESSMENT — PAIN - FUNCTIONAL ASSESSMENT
PAIN_FUNCTIONAL_ASSESSMENT: 0-10

## 2024-08-22 ASSESSMENT — PAIN SCALES - WONG BAKER: WONGBAKER_NUMERICALRESPONSE: NO HURT

## 2024-08-22 ASSESSMENT — PAIN SCALES - PAIN ASSESSMENT IN ADVANCED DEMENTIA (PAINAD)
FACIALEXPRESSION: SMILING OR INEXPRESSIVE
BREATHING: NORMAL
TOTALSCORE: 0
BODYLANGUAGE: RELAXED
CONSOLABILITY: NO NEED TO CONSOLE

## 2024-08-22 ASSESSMENT — ACTIVITIES OF DAILY LIVING (ADL): BATHING_ASSISTANCE: MODERATE

## 2024-08-22 NOTE — PROGRESS NOTES
ASSESSMENT & PLAN:     Gastrointestinal hemorrhage, unspecified gastrointestinal hemorrhage type     Generalized weakness     Acute cystitis without hematuria     Acute renal failure superimposed on stage 3a chronic kidney disease (Multi)     Tremor, essential     Restless leg syndrome     Paroxysmal atrial fibrillation (Multi)     Essential hypertension     Generalized anxiety disorder     Gallstones     Glaucoma of both eyes     -Hold aspirin for now.  -Continue with IV fluid hydration.  -N.p.o. after midnight for possible EGD and colonoscopy tomorrow.  -Pantoprazole 80 mg now then 40 mg twice daily from tomorrow.  -Repeat hemoglobin level.  The goal is to transfuse if hemoglobin level drop below 8.  -Continue with IV antibiotics ceftriaxone especially with cystitis.  -Monitor for sepsis.  -Telemetry monitoring.  -Resume home medication for dementia, restless leg syndrome, and depression.  Patient does have gallstones but no evidence of acute cholecystitis.  -GI consult for GI bleed.  -PT/OT evaluation for generalized weakness.  -SCD for DVT prophylaxis.    8/21/24  -GI following  -Hgb stable, 11.2 today, no further melena since being admitted  -s/p EGD today that showed mild duodenitis, gastritis, had biopsies done, 4cm hiatal hernia  -plan for colonoscopy as outpt  -will dc ASA, was on for primary prevention  -cont PPI daily  -empiric rocephin for UTI, fu ucx  -Scr improving 1.90 today, cont IVF, trend BMP  -PT/OT eval pending  -cont other home meds as ordered    8/22/24  -GI signed off, send with PPI daily x8w, outpt colonoscopy  -no further bleeding, Hgb improved 11.4  -JEFF improving, scr 1.38 today. Will stop IVF today.   -HTNsive, resume home meds  -cont empiric ctx for uti, will plan for total x3d course. Ucx growing >100k cfu gnr, fu final results  -snf dispo, medically ready for discharge    Mukul Valentino MD    SUBJECTIVE     NAEON. Feeling good. Last BM was dark brown. Denies n/v, abd  pain.      OBJECTIVE:       Last Recorded Vitals:  Vitals:    08/22/24 0443 08/22/24 0727 08/22/24 0800 08/22/24 1000   BP: 152/73 (!) 181/77 151/69 150/65   BP Location: Right arm Left arm     Patient Position: Lying Sitting     Pulse: 68 67     Resp: 17 22     Temp: 36.5 °C (97.7 °F) 36.1 °C (97 °F)     TempSrc: Temporal Temporal     SpO2: 93% 90%     Weight:       Height:           Last I/O:  I/O last 3 completed shifts:  In: 3184.1 (55.4 mL/kg) [P.O.:780; I.V.:1254.1 (21.8 mL/kg); IV Piggyback:1150]  Out: 1800 (31.3 mL/kg) [Urine:1800 (0.9 mL/kg/hr)]  Weight: 57.5 kg     Physical Exam:  GEN: appears stated age, NAD  CV: RRR, no m/r/g, no LE edema  LUNGS: CTAB, no w/r/c  ABD: soft, NT, ND, NBS  SKIN: no rashes  MSK; no gross deformities, normal joints  NEURO: A+Ox3, no FND  PSYCH: appropriate mood, affect    Inpatient Medications:  amLODIPine, 5 mg, oral, Daily  atorvastatin, 40 mg, oral, Nightly  busPIRone, 10 mg, oral, TID  cefTRIAXone, 1 g, intravenous, q24h  donepezil, 10 mg, oral, Nightly  losartan, 25 mg, oral, BID  memantine, 5 mg, oral, BID  pantoprazole, 40 mg, oral, Daily before breakfast  rOPINIRole, 0.5 mg, oral, Nightly  sertraline, 100 mg, oral, BID        PRN Medications  PRN medications: acetaminophen **OR** acetaminophen **OR** acetaminophen, ALPRAZolam, melatonin, ondansetron **OR** ondansetron, polyethylene glycol    Continuous Medications:           LABS AND IMAGING:     Labs:  Results for orders placed or performed during the hospital encounter of 08/20/24 (from the past 24 hour(s))   Magnesium   Result Value Ref Range    Magnesium 1.81 1.60 - 2.40 mg/dL   Basic Metabolic Panel   Result Value Ref Range    Glucose 110 (H) 74 - 99 mg/dL    Sodium 136 136 - 145 mmol/L    Potassium 3.7 3.5 - 5.3 mmol/L    Chloride 106 98 - 107 mmol/L    Bicarbonate 20 (L) 21 - 32 mmol/L    Anion Gap 14 10 - 20 mmol/L    Urea Nitrogen 37 (H) 6 - 23 mg/dL    Creatinine 1.38 (H) 0.50 - 1.05 mg/dL    eGFR 38 (L) >60  mL/min/1.73m*2    Calcium 9.0 8.6 - 10.3 mg/dL   CBC   Result Value Ref Range    WBC 6.9 4.4 - 11.3 x10*3/uL    nRBC 0.0 0.0 - 0.0 /100 WBCs    RBC 3.75 (L) 4.00 - 5.20 x10*6/uL    Hemoglobin 11.4 (L) 12.0 - 16.0 g/dL    Hematocrit 34.7 (L) 36.0 - 46.0 %    MCV 93 80 - 100 fL    MCH 30.4 26.0 - 34.0 pg    MCHC 32.9 32.0 - 36.0 g/dL    RDW 15.9 (H) 11.5 - 14.5 %    Platelets 169 150 - 450 x10*3/uL        Imaging:  Esophagogastroduodenoscopy (EGD)  Table formatting from the original result was not included.  Impression  The 2nd part of the duodenum appeared normal.  Mild erythematous mucosa in the duodenal bulb  Moderate erythematous mucosa with erosion in the antrum  Performed forceps biopsies in the body of the stomach, incisura and antrum   to rule out H. pylori  4 cm hiatal hernia    Findings  The 2nd part of the duodenum appeared normal.  Mild, patchy erythematous mucosa in the duodenal bulb  Moderate erythematous mucosa with erosion in the antrum. striped  Performed forceps biopsies in the body of the stomach, incisura and antrum   to rule out H. pylori  4 cm hiatal hernia, confirmed by retroflexion. Esophagus otherwise normal.   Hill grade III hiatal hernia    Recommendation  Await pathology results   Follow up with PCP   [facility] to [facility] for [facility]  Can do colonoscopy as outpatient       Indication  History of gastritis, Anemia, unspecified type, Gastrointestinal   hemorrhage, unspecified gastrointestinal hemorrhage type    Staff  Staff Role   Clifford Garcia MD Proceduralist     Medications  See Anesthesia Record.     Preprocedure  A history and physical has been performed, and patient medication   allergies have been reviewed. The patient's tolerance of previous   anesthesia has been reviewed. The risks and benefits of the procedure and   the sedation options and risks were discussed with the patient. All   questions were answered and informed consent obtained.    Details of the Procedure  The patient  underwent monitored anesthesia care, which was administered by   an anesthesia professional. The patient's blood pressure, ECG, ETCO2,   heart rate, level of consciousness, oxygen and respirations were monitored   throughout the procedure. The scope was introduced through the mouth and   advanced to the second part of the duodenum. Retroflexion was performed in   the cardia. The patient's estimated blood loss was minimal (<5 mL). The   procedure was not difficult. The patient tolerated the procedure well.   There were no apparent adverse events.     Events  Procedure Events   Event Event Time   ENDO SCOPE IN TIME 8/21/2024 10:37 AM   ENDO SCOPE OUT TIME 8/21/2024 10:43 AM     Specimens  ID Type Source Tests Collected by Time   1 : r/o h-pylori Tissue STOMACH ANTRUM BIOPSY SURGICAL PATHOLOGY EXAM   Clifford Garcia MD 8/21/2024 1040     Procedure Location  [facility] 8 Cardiac Intensive Care  [address] [place] [id number]  [phone]    Referring Provider  Lona Jean, APRN-CNP    Procedure Provider  Clifford Garcia MD

## 2024-08-22 NOTE — DISCHARGE SUMMARY
DISCHARGE DIAGNOSIS     Melena  Gastritis  Duodenitis  Hiatal hernia  JEFF on CKD  Acute cystitis    HOSPITAL COURSE AND DETAILS     84F who presented with reported melena. Hgb was slightly lower than baseline 11.1 on admission, BL 12-13. She was seen by GI, had EGD done that showed gastritis, duodenitis, 4cm hiatal hernia, no ulcerations, no active bleeding, had biopsies taken to r/o h. Pylori. No further bleeding while admitted, Hgb stable/improved. Send with PPI daily, fu with GI as outpt. Was on ASA 81mg for primary prevention, will dc this on discharge. UA showed UTI, treated with 3d abx for acute cystitis. Also had JEFF on CKD, improved with IVF. She was seen by PT/OT, rec for SNF. Stable for dc to SNF.     Total time spent on discharge services 31 minutes.     DISCHARGE PHYSICAL EXAM     Last Recorded Vitals:  Vitals:    08/22/24 0727 08/22/24 0800 08/22/24 1000 08/22/24 1111   BP: (!) 181/77 151/69 150/65 155/70   BP Location: Left arm   Left arm   Patient Position: Sitting   Lying   Pulse: 67   63   Resp: 22   17   Temp: 36.1 °C (97 °F)   36.1 °C (97 °F)   TempSrc: Temporal   Temporal   SpO2: 90%   93%   Weight:       Height:           Physical Exam:  GEN: appears stated age, NAD  CV: RRR, no m/r/g, no LE edema  LUNGS: CTAB, no w/r/c  ABD: soft, NT, ND, NBS  SKIN: no rashes  MSK; no gross deformities, normal joints  NEURO: A+Ox3, no FND  PSYCH: appropriate mood, affect      PERTINENT LABS AND IMAGING     Results for orders placed or performed during the hospital encounter of 08/20/24 (from the past 96 hour(s))   CBC and Auto Differential   Result Value Ref Range    WBC 12.9 (H) 4.4 - 11.3 x10*3/uL    nRBC 0.0 0.0 - 0.0 /100 WBCs    RBC 3.66 (L) 4.00 - 5.20 x10*6/uL    Hemoglobin 11.1 (L) 12.0 - 16.0 g/dL    Hematocrit 34.4 (L) 36.0 - 46.0 %    MCV 94 80 - 100 fL    MCH 30.3 26.0 - 34.0 pg    MCHC 32.3 32.0 - 36.0 g/dL    RDW 16.9 (H) 11.5 - 14.5 %    Platelets 204 150 - 450 x10*3/uL    Neutrophils % 82.8  40.0 - 80.0 %    Immature Granulocytes %, Automated 0.9 0.0 - 0.9 %    Lymphocytes % 5.7 13.0 - 44.0 %    Monocytes % 10.1 2.0 - 10.0 %    Eosinophils % 0.0 0.0 - 6.0 %    Basophils % 0.5 0.0 - 2.0 %    Neutrophils Absolute 10.68 (H) 1.60 - 5.50 x10*3/uL    Immature Granulocytes Absolute, Automated 0.11 0.00 - 0.50 x10*3/uL    Lymphocytes Absolute 0.74 (L) 0.80 - 3.00 x10*3/uL    Monocytes Absolute 1.30 (H) 0.05 - 0.80 x10*3/uL    Eosinophils Absolute 0.00 0.00 - 0.40 x10*3/uL    Basophils Absolute 0.07 0.00 - 0.10 x10*3/uL   Comprehensive metabolic panel   Result Value Ref Range    Glucose 74 74 - 99 mg/dL    Sodium 133 (L) 136 - 145 mmol/L    Potassium 5.0 3.5 - 5.3 mmol/L    Chloride 100 98 - 107 mmol/L    Bicarbonate 18 (L) 21 - 32 mmol/L    Anion Gap 20 10 - 20 mmol/L    Urea Nitrogen 52 (H) 6 - 23 mg/dL    Creatinine 2.08 (H) 0.50 - 1.05 mg/dL    eGFR 23 (L) >60 mL/min/1.73m*2    Calcium 9.4 8.6 - 10.3 mg/dL    Albumin 3.7 3.4 - 5.0 g/dL    Alkaline Phosphatase 60 33 - 136 U/L    Total Protein 7.3 6.4 - 8.2 g/dL    AST 19 9 - 39 U/L    Bilirubin, Total 0.4 0.0 - 1.2 mg/dL    ALT 10 7 - 45 U/L   Type and Screen   Result Value Ref Range    ABO TYPE O     Rh TYPE POS     ANTIBODY SCREEN NEG    Urinalysis with Reflex Culture and Microscopic   Result Value Ref Range    Color, Urine Yellow Light-Yellow, Yellow, Dark-Yellow    Appearance, Urine Turbid (N) Clear    Specific Gravity, Urine 1.019 1.005 - 1.035    pH, Urine 5.5 5.0, 5.5, 6.0, 6.5, 7.0, 7.5, 8.0    Protein, Urine 30 (1+) (A) NEGATIVE, 10 (TRACE), 20 (TRACE) mg/dL    Glucose, Urine Normal Normal mg/dL    Blood, Urine 0.1 (1+) (A) NEGATIVE    Ketones, Urine NEGATIVE NEGATIVE mg/dL    Bilirubin, Urine NEGATIVE NEGATIVE    Urobilinogen, Urine Normal Normal mg/dL    Nitrite, Urine 2+ (A) NEGATIVE    Leukocyte Esterase, Urine 500 Carlitos/µL (A) NEGATIVE   Extra Urine Gray Tube   Result Value Ref Range    Extra Tube Hold for add-ons.    Lipase   Result Value Ref  Range    Lipase 43 9 - 82 U/L   Troponin I, High Sensitivity, Initial   Result Value Ref Range    Troponin I, High Sensitivity 73 (HH) 0 - 13 ng/L   Microscopic Only, Urine   Result Value Ref Range    WBC, Urine >50 (A) 1-5, NONE /HPF    WBC Clumps, Urine OCCASIONAL Reference range not established. /HPF    RBC, Urine 11-20 (A) NONE, 1-2, 3-5 /HPF    Squamous Epithelial Cells, Urine 1-9 (SPARSE) Reference range not established. /HPF    Bacteria, Urine 3+ (A) NONE SEEN /HPF    Mucus, Urine FEW Reference range not established. /LPF   Urine Culture    Specimen: Clean Catch/Voided; Urine   Result Value Ref Range    Urine Culture >100,000 Enteric bacilli (A)    Sars-CoV-2 PCR   Result Value Ref Range    Coronavirus 2019, PCR Not Detected Not Detected   ECG 12 lead   Result Value Ref Range    Ventricular Rate 67 BPM    Atrial Rate 67 BPM    WY Interval 138 ms    QRS Duration 74 ms    QT Interval 412 ms    QTC Calculation(Bazett) 435 ms    P Axis 68 degrees    R Axis 43 degrees    T Axis 174 degrees    QRS Count 11 beats    Q Onset 229 ms    P Onset 160 ms    P Offset 220 ms    T Offset 435 ms    QTC Fredericia 427 ms   Coagulation Screen   Result Value Ref Range    Protime 14.0 (H) 9.8 - 12.8 seconds    INR 1.2 (H) 0.9 - 1.1    aPTT 27 27 - 38 seconds   Troponin, High Sensitivity, 1 Hour   Result Value Ref Range    Troponin I, High Sensitivity 72 (HH) 0 - 13 ng/L   ECG 12 lead   Result Value Ref Range    Ventricular Rate 67 BPM    Atrial Rate 67 BPM    WY Interval 144 ms    QRS Duration 74 ms    QT Interval 430 ms    QTC Calculation(Bazett) 454 ms    P Axis 63 degrees    R Axis 27 degrees    T Axis 129 degrees    QRS Count 11 beats    Q Onset 230 ms    P Onset 158 ms    P Offset 222 ms    T Offset 445 ms    QTC Fredericia 446 ms   CBC   Result Value Ref Range    WBC 9.0 4.4 - 11.3 x10*3/uL    nRBC 0.0 0.0 - 0.0 /100 WBCs    RBC 3.69 (L) 4.00 - 5.20 x10*6/uL    Hemoglobin 11.2 (L) 12.0 - 16.0 g/dL    Hematocrit 34.4 (L)  36.0 - 46.0 %    MCV 93 80 - 100 fL    MCH 30.4 26.0 - 34.0 pg    MCHC 32.6 32.0 - 36.0 g/dL    RDW 16.1 (H) 11.5 - 14.5 %    Platelets 210 150 - 450 x10*3/uL   Basic metabolic panel   Result Value Ref Range    Glucose 109 (H) 74 - 99 mg/dL    Sodium 135 (L) 136 - 145 mmol/L    Potassium 3.6 3.5 - 5.3 mmol/L    Chloride 103 98 - 107 mmol/L    Bicarbonate 21 21 - 32 mmol/L    Anion Gap 15 10 - 20 mmol/L    Urea Nitrogen 55 (H) 6 - 23 mg/dL    Creatinine 1.90 (H) 0.50 - 1.05 mg/dL    eGFR 26 (L) >60 mL/min/1.73m*2    Calcium 9.2 8.6 - 10.3 mg/dL   SST TOP   Result Value Ref Range    Extra Tube Hold for add-ons.    Magnesium   Result Value Ref Range    Magnesium 1.81 1.60 - 2.40 mg/dL   Basic Metabolic Panel   Result Value Ref Range    Glucose 110 (H) 74 - 99 mg/dL    Sodium 136 136 - 145 mmol/L    Potassium 3.7 3.5 - 5.3 mmol/L    Chloride 106 98 - 107 mmol/L    Bicarbonate 20 (L) 21 - 32 mmol/L    Anion Gap 14 10 - 20 mmol/L    Urea Nitrogen 37 (H) 6 - 23 mg/dL    Creatinine 1.38 (H) 0.50 - 1.05 mg/dL    eGFR 38 (L) >60 mL/min/1.73m*2    Calcium 9.0 8.6 - 10.3 mg/dL   CBC   Result Value Ref Range    WBC 6.9 4.4 - 11.3 x10*3/uL    nRBC 0.0 0.0 - 0.0 /100 WBCs    RBC 3.75 (L) 4.00 - 5.20 x10*6/uL    Hemoglobin 11.4 (L) 12.0 - 16.0 g/dL    Hematocrit 34.7 (L) 36.0 - 46.0 %    MCV 93 80 - 100 fL    MCH 30.4 26.0 - 34.0 pg    MCHC 32.9 32.0 - 36.0 g/dL    RDW 15.9 (H) 11.5 - 14.5 %    Platelets 169 150 - 450 x10*3/uL        CT abdomen pelvis wo IV contrast   Final Result   Bladder is partially distended with mild wall thickening. Tiny locule   of air is noted in the urinary bladder. Correlate for history of any   recent instrumentation. In the absence of such history findings raise   concern for infectious cystitis. Correlate with urinalysis.        Status post right nephrectomy.        Cholelithiasis.        Diverticulosis without evidence for acute diverticulitis.        Additional findings as noted above.        MACRO:    None        Signed by: Roberto Lee 8/20/2024 8:22 PM   Dictation workstation:   [id number]      XR chest 1 view   Final Result   No acute cardiopulmonary process.        MACRO:   None        Signed by: Guillermina Rodrigez 8/20/2024 7:01 PM   Dictation workstation:   [id number]          No echocardiogram results found for the past 14 days    DISCHARGE MEDICATIONS        Your medication list        CONTINUE taking these medications        Instructions Last Dose Given Next Dose Due   ALPRAZolam 0.5 mg tablet  Commonly known as: Xanax      Take 1 tablet (0.5 mg) by mouth 3 times a day as needed for anxiety.       amLODIPine 5 mg tablet  Commonly known as: Norvasc      Take 1 tablet (5 mg) by mouth once daily.       atorvastatin 40 mg tablet  Commonly known as: Lipitor      Take 1 tablet (40 mg) by mouth once daily.       busPIRone 10 mg tablet  Commonly known as: Buspar      Take 1 tablet (10 mg) by mouth 3 times a day.       cholecalciferol 50,000 unit capsule  Commonly known as: Vitamin D-3      Take 1 capsule (50,000 Units) by mouth 1 (one) time per week.       donepezil 10 mg tablet  Commonly known as: Aricept      Take 1 tablet (10 mg) by mouth once daily at bedtime.       losartan 25 mg tablet  Commonly known as: Cozaar      Take 1 tablet (25 mg) by mouth 2 times a day.       Lumigan 0.01 % ophthalmic solution  Generic drug: bimatoprost           melatonin 10 mg capsule           memantine 10 mg tablet  Commonly known as: Namenda      Take 1 tablet (10 mg) by mouth 2 times a day.       pantoprazole 40 mg EC tablet  Commonly known as: ProtoNix      Take 1 tablet (40 mg) by mouth once daily.       rOPINIRole 0.5 mg tablet  Commonly known as: Requip      Take 1 tablet (0.5 mg) by mouth once daily at bedtime.       sertraline 100 mg tablet  Commonly known as: Zoloft      Take 1 tablet (100 mg) by mouth 2 times a day.              STOP taking these medications      aspirin 81 mg EC tablet        cephalexin 500 mg  capsule  Commonly known as: Keflex                 OUTPATIENT FOLLOW-UP     Future Appointments   Date Time Provider Department Center   8/30/2024  2:00 PM Brandin Winters MD [id number] [facility]   9/20/2024  3:45 PM Manoj Bustamante MD [id number] [facility]   10/8/2024  2:10 PM Bhupendra Gorman MD [id number] [facility]   11/19/2024  1:15 PM Manoj Bustamante MD [id number] [facility]   2/3/2025 11:45 AM Serafin Kowalski MD [id number] [facility]

## 2024-08-22 NOTE — PROGRESS NOTES
Physical Therapy    Physical Therapy Treatment    Patient Name: Jodi Nuñez  MRN: [id number]  Today's Date: 8/22/2024  Time Calculation  Start Time: 1419  Stop Time: 1432  Time Calculation (min): 13 min     812/812-A    Assessment/Plan   PT Assessment  PT Assessment Results: Decreased strength, Decreased endurance, Impaired balance, Decreased mobility, Impaired judgement, Decreased safety awareness, Impaired hearing  Rehab Prognosis: Good  Barriers to Discharge: Dementia, limited mobility  Treatment Tolerance: Patient tolerated treatment well, Patient limited by fatigue  Medical Staff Made Aware: Yes  End of Session Communication: Bedside nurse, PCT/NA/CTA  Assessment Comment: Patient continues to require (A) for safety with bed mobility/transfers. Patient will benefit from additional PT to address deficits and improve mobility.  End of Session Patient Position: Bed, 3 rail up, Alarm off, not on at start of session (Call light, phone, and tray table within reach.)  PT Plan  Inpatient/Swing Bed or Outpatient: Inpatient  PT Plan Treatment/Interventions: Bed mobility, Transfer training, Gait training, Balance training, Strengthening, Therapeutic exercise, Therapeutic activity  PT Frequency: 3 times per week  PT Discharge Recommendations:  ([facility] PT at moderate intensity adn moderate fequency in 24/7 setting. with continued PT)  PT Recommended Transfer Status: Assist x1, ww      General Visit Information:   PT  Visit  PT Received On: 08/22/24  General  Family/Caregiver Present: Yes (Daughter present.)  Prior to Session Communication: Bedside nurse  Patient Position Received: Bed, 3 rail up, Alarm on  General Comment: Pleasant and cooperative.    Subjective     Precautions:  Precautions  Hearing/Visual Limitations: glassses, Ruby  Medical Precautions: Fall precautions    Objective     Pain:  Pain Assessment  Pain Assessment: 0-10  0-10 (Numeric) Pain Score: 0 - No pain    Cognition:  Cognition  Overall Cognitive Status:  Within Functional Limits    Treatments:           Bed Mobility  Bed Mobility: Yes  Bed Mobility 1  Bed Mobility 1: Supine to sitting, Sitting to supine  Level of Assistance 1: Minimum assistance  Bed Mobility Comments 1: HOB ~30° supine to sit and HOB flat sit to supine. Hand over hand (A) to reach across body to use the bed rail for support. (A) to lift UB from HOB while moving LEs over the EOB. (A) to support UB while lifting LEs onto the bed. No dizziness with positional change.  Ambulation/Gait Training  Ambulation/Gait Training Performed: Yes  Ambulation/Gait Training 1  Surface 1: Level tile  Device 1: Rolling walker  Gait Support Devices: Gait belt  Assistance 1: Minimum assistance  Comments/Distance (ft) 1: ~3ft x2 Bed<-->BSC. COG posterior to JULIO; corrections made with cues. Slow with wt shifting. Slightly forward flexed posture. Decreased step height/length B. v/c and (A) for safer maneuvering of ww with transfer training.  Transfers  Transfer: Yes  Transfer 1  Technique 1: Sit to stand, Stand to sit  Transfer Level of Assistance 1: Minimum assistance  Trials/Comments 1: (3x). v/c for safe hand placement and technique. Slow transition of hands to/from ww. Retropulsive, but receptive to v/c and (A) for shifting COG forward over JULIO.          Outcome Measures:     [facility] Basic Mobility  Turning from your back to your side while in a flat bed without using bedrails: A little  Moving from lying on your back to sitting on the side of a flat bed without using bedrails: A lot  Moving to and from bed to chair (including a wheelchair): A little  Standing up from a chair using your arms (e.g. wheelchair or bedside chair): A little  To walk in hospital room: A little  Climbing 3-5 steps with railing: Total  Basic Mobility - Total Score: 15     Education Documentation  Mobility Training, taught by Ayala Gabriel PTA at 8/22/2024  3:21 PM.  Learner: Family, Patient  Readiness: Acceptance  Method: Explanation,  Demonstration  Response: Verbalizes Understanding, Needs Reinforcement  Comment: See therapy note.    EDUCATION:  Individual(s) Educated: Patient  Education Provided: Body Mechanics, Fall Risk, POC, Posture (Balance; Safety with bed mobility/transfers.)  Patient Response to Education: Patient/Caregiver Verbalized Understanding of Information, Patient/Caregiver Performed Return Demonstration of Exercises/Activities    Encounter Problems       Encounter Problems (Active)       PT Problem       Transfers sit <> stand/ bed <> chair with ww CGA (Progressing)       Start:  08/21/24    Expected End:  09/04/24            Patient to ambulate with ww 20' CGA (Progressing)       Start:  08/21/24    Expected End:  09/04/24            Patient to perform multilevel reach with single UE support > 2 inches out of JULIO CGA (Progressing)       Start:  08/21/24    Expected End:  09/04/24

## 2024-08-22 NOTE — PROGRESS NOTES
08/22/24 1219   Current Planned Discharge Disposition   Current Planned Discharge Disposition SNF  ([facility])     Pt accepted to the facility. Insurance precert requested. Pt medically clear for discharge once precert is received.     Update- Precert received and pt medically cleared for discharge today. Facility notified and confirms they are able to accommodate pt today. Transport requested via roundtrip and confirms 4pm pickup. Care team and facility notified. SW notified pt and pt daughter Arminda, pt and Arminda in agreement with discharge plan. Arminda requesting to ride with pt to facility in ambulance, transport company notified via roundtrip of request.

## 2024-08-22 NOTE — PROGRESS NOTES
Occupational Therapy    Evaluation    Patient Name: Jodi Nuñez  MRN: [id number]  Today's Date: 8/22/2024  Time Calculation  Start Time: 0956  Stop Time: 1012  Time Calculation (min): 16 min      Assessment:  OT Assessment: Patient requires mod to max A level for ADLs, mod A for sit <> stands and min A for side stepping with a FWW.  Prognosis: Good  End of Session Communication: Bedside nurse (MD in the room at end of therapy evaluation.)  End of Session Patient Position: Bed, 3 rail up, Alarm on  OT Assessment Results: Decreased ADL status, Decreased endurance, Decreased functional mobility  Prognosis: Good  Plan:  Treatment Interventions: ADL retraining, Functional transfer training, UE strengthening/ROM, Endurance training  OT Frequency: 2 times per week  OT Discharge Recommendations: Moderate intensity level of continued care  OT - OK to Discharge: Yes (Once medically appropriate.)  Treatment Interventions: ADL retraining, Functional transfer training, UE strengthening/ROM, Endurance training    Subjective     General:  General  Reason for Referral: ADLs  Referred By: Dr. Calixto  Past Medical History Relevant to Rehab: HTN, Arthritis, Dementia, CKD, Anxiety, A fib, CAD, RLS, Essential tremors, Ataxia, Smoker, Glaucoma, R nephrectomy  Family/Caregiver Present: Yes  Caregiver Feedback: Daughter present - supportive.  Prior to Session Communication: Bedside nurse (Cleared for therapy evaluation by RN.)  Patient Position Received: Bed, 3 rail up, Alarm off, caregiver present  General Comment: Patient presented with c/o generalized weakness and black stools x3-4 days. Patient also c/o feeling fatigued, difficulty with ambulation and poor appetite. Patient s/p EGD 8/21/24. UA (+). CXR: (-). CT chest abd: concern for infectious cystitis. Dx: Gastrointestinal hemorrhage.  Precautions:  Hearing/Visual Limitations: Patient is [place].  Medical Precautions: Fall precautions     Pain:  Pain Assessment  Pain Assessment: 0-10  0-10  (Numeric) Pain Score: 0 - No pain    Objective   Cognition:  Overall Cognitive Status: Within Functional Limits  Orientation Level: Oriented X4           Home Living:  Home Living Comments: Patient is a questionable historian, daughter able to verify PLOF/home setup. Patient lives with daughter in a 1 story house with 3-4 HAYES with a HR. Walk in shower with a shower seat and grab bars. Laundry on the main level.  Prior Function:  Prior Function Comments: Patient ambulates with a rollator and cane, uses both devices inside and outside; owns a FWW. Independent with ADLs, shares IADLs. Reports 3 falls in the past 3 months. Patient does not drive, daughter assists with transportation.     ADL:  Eating Assistance:  (Setup/S)  Grooming Assistance: Minimal  Bathing Assistance: Moderate  UE Dressing Assistance:  (Setup/S)  LE Dressing Assistance: Maximal  Toileting Assistance with Device: Maximal  Activity Tolerance:  Endurance: Decreased tolerance for upright activites  Bed Mobility/Transfers: Bed Mobility  Bed Mobility: Yes  Bed Mobility 1  Bed Mobility 1: Supine to sitting, Sitting to supine  Bed Mobility Comments 1: HOB elevated. Mod A level with increased time.    Transfers  Transfer: Yes  Transfer 1  Technique 1: Sit to stand, Stand to sit  Transfer Device 1:  (FWW)  Trials/Comments 1: Patient completed sit <> stand from EOB at mod A level and side stepped at EOB at min A level. Completed with a FWW, requires cues for walker management/safe hand placement.     Sitting Balance:  Dynamic Sitting Balance  Dynamic Sitting-Comments: Fair  Standing Balance:  Dynamic Standing Balance  Dynamic Standing-Comments: Poor +/fair -      Strength:  Strength Comments: B/L UE MMT WFL.     Extremities: RUE   RUE : Within Functional Limits and LUE   LUE: Within Functional Limits    Outcome Measures:[facility] Daily Activity  Putting on and taking off regular lower body clothing: A lot  Bathing (including washing, rinsing, drying): A  lot  Putting on and taking off regular upper body clothing: A little  Toileting, which includes using toilet, bedpan or urinal: A lot  Taking care of personal grooming such as brushing teeth: A little  Eating Meals: A little  Daily Activity - Total Score: 15    Education Documentation  Body Mechanics, taught by Vidya Stevenson OT at 8/22/2024 12:32 PM.  Learner: Patient  Readiness: Acceptance  Method: Explanation  Response: Needs Reinforcement  Comment: Walker management.    OP EDUCATION:  Education  Individual(s) Educated: Patient  Education Provided: POC discussed and agreed upon, Risk and benefits of OT discussed with patient or other, Fall precautons  Patient Response to Education: Patient/Caregiver Verbalized Understanding of Information    Goals:  Encounter Problems       Encounter Problems (Active)       OT Goals       Patient will complete household distance functional mobility with a FWW at CGA level.  (Progressing)       Start:  08/22/24    Expected End:  09/05/24            Patient will complete functional transfers with a FWW at CGA level.  (Progressing)       Start:  08/22/24    Expected End:  09/05/24            Patient will complete lower body dressing at a CGA level.  (Progressing)       Start:  08/22/24    Expected End:  09/05/24            Patient will tolerate standing greater than 4 minutes during functional tasks. (Progressing)       Start:  08/22/24    Expected End:  09/05/24            Patient will demonstrate fair/fair + standing balance during functional tasks.  (Progressing)       Start:  08/22/24    Expected End:  09/05/24

## 2024-08-23 ENCOUNTER — NURSING HOME VISIT (OUTPATIENT)
Dept: POST ACUTE CARE | Facility: EXTERNAL LOCATION | Age: 84
End: 2024-08-23
Payer: MEDICARE

## 2024-08-23 DIAGNOSIS — I10 ESSENTIAL HYPERTENSION: ICD-10-CM

## 2024-08-23 DIAGNOSIS — I25.10 CORONARY ARTERY DISEASE INVOLVING NATIVE CORONARY ARTERY OF NATIVE HEART WITHOUT ANGINA PECTORIS: ICD-10-CM

## 2024-08-23 DIAGNOSIS — K92.2 GASTROINTESTINAL HEMORRHAGE, UNSPECIFIED GASTROINTESTINAL HEMORRHAGE TYPE: Primary | ICD-10-CM

## 2024-08-23 DIAGNOSIS — G30.0 MODERATE EARLY ONSET ALZHEIMER'S DEMENTIA WITH ANXIETY (MULTI): ICD-10-CM

## 2024-08-23 DIAGNOSIS — F02.B4 MODERATE EARLY ONSET ALZHEIMER'S DEMENTIA WITH ANXIETY (MULTI): ICD-10-CM

## 2024-08-23 DIAGNOSIS — N18.31 CHRONIC KIDNEY DISEASE, STAGE 3A (MULTI): ICD-10-CM

## 2024-08-23 DIAGNOSIS — I48.0 PAROXYSMAL ATRIAL FIBRILLATION (MULTI): ICD-10-CM

## 2024-08-23 DIAGNOSIS — F41.1 GENERALIZED ANXIETY DISORDER: ICD-10-CM

## 2024-08-23 LAB
ATRIAL RATE: 67 BPM
ATRIAL RATE: 67 BPM
BACTERIA UR CULT: ABNORMAL
P AXIS: 63 DEGREES
P AXIS: 68 DEGREES
P OFFSET: 220 MS
P OFFSET: 222 MS
P ONSET: 158 MS
P ONSET: 160 MS
PR INTERVAL: 138 MS
PR INTERVAL: 144 MS
Q ONSET: 229 MS
Q ONSET: 230 MS
QRS COUNT: 11 BEATS
QRS COUNT: 11 BEATS
QRS DURATION: 74 MS
QRS DURATION: 74 MS
QT INTERVAL: 412 MS
QT INTERVAL: 430 MS
QTC CALCULATION(BAZETT): 435 MS
QTC CALCULATION(BAZETT): 454 MS
QTC FREDERICIA: 427 MS
QTC FREDERICIA: 446 MS
R AXIS: 27 DEGREES
R AXIS: 43 DEGREES
T AXIS: 129 DEGREES
T AXIS: 174 DEGREES
T OFFSET: 435 MS
T OFFSET: 445 MS
VENTRICULAR RATE: 67 BPM
VENTRICULAR RATE: 67 BPM

## 2024-08-23 PROCEDURE — 99306 1ST NF CARE HIGH MDM 50: CPT | Performed by: INTERNAL MEDICINE

## 2024-08-23 NOTE — NURSING NOTE
[facility]. Patie t alert x3 and stable vitals All lines and IV removed before discharge.

## 2024-08-23 NOTE — LETTER
HPI:  Cathy Espinosa is a 52 year old female being evaluated through a telephone encounter for sore throat, body aches, chills/sweating.    Cathy Pal reports that she has had the symptoms for 5 days.  Cathy Pal reports that she started with body aches and has had that on and off throughout. 2 days ago she began to have throat pain.  Today she has been having chills with sweating.  She does not have a thermometer so does not know if she has fever.  Patient has been taking OTC tylenol which helps.  Patient is not currently working.    COVID-19 SCREENING QUESTIONS  Fever (Temp >100.4)? No  Cough? No  SOB? Yes, brief momentary  Recent travel? No  Contact with confirmed or suspected case of COVID-19? No  Attendance at event where COVID-19 cases have been reported? No      High-risk factors  Age over 60? No  Chronic medical conditions (DM, Heart Disease, Chronic Lung Disease, CKD, Immunosuppressant Therapy) No     Cathy Pal's problem list, allergies, medications and vital signs were reviewed.    ROS:  HEENT:  See HPI.  HA.  GI:  Denies N/V/D, abdominal pain, loss of taste/smell.      Assessment/Plan:    Body aches  (primary encounter diagnosis)  Sore throat  Chills  Plan: Encouraged patient to increase fluids.  Hot teas with honey.  May use OTC ibuprofen or acetaminophen as directed for pain/HA.  Current CDC guidelines for leaving home after an illness:  3 days without fever, 1 week since symptoms began, symptoms are improving.  Patient does not meet these criteria currently.      Reviewed common symptoms of COVID 19 and to seek ER care if develops shortness of breath and/or chest pain.    Education was provided regarding the above findings and plan.  The patient expressed verbal understanding and agreement with the above plan of care.   20 minutes was spent with the patient, greater than 50% was time spent counseling the patient regarding the above findings and plan.    Patient: Jodi Nuñez  : 1940    Encounter Date: 2024    Subjective  Patient ID: Jodi Nuñez is a 84 y.o. female who is acute skilled care and presents for initial visit for skilled nursing.    84-year-old female patient has been admitted again, few months ago she was admitted for fracture of patella, at that time she recovered well, she lives with her daughter, patient has a progressive cognitive impairment, patient has a previous history of carcinoma of the renal pelvis, patient has chronic anemia, patient has a chronic kidney disease.  This time patient started having GI bleeding, it was a black tarry stools.  Hemoglobin did not drop less than 11.  Upper endoscopy was done, hiatus hernia and mild changes of gastritis identified.  Patient did not require any blood transfusion or blood products.  Patient is back here in the facility, when I see this patient patient's daughter is present at bedside, I told daughter that I will review the data and information and get back to her.  CT scan of abdomen and pelvis showed diverticulosis, there is a unilateral nephrectomy.  As much as her daughter knows patient does not have any black tarry stools anymore.  Patient has history of coronary artery disease, hyperlipidemia, chronic depression anxiety.  She has been a smoker.  She was somewhat anxious but sitting upright, did not talk much most of the communication was done with patient's daughter.  All other reports and information were reviewed.         Review of Systems   Constitutional:  Positive for activity change, appetite change and fatigue.   Respiratory:  Negative for apnea, cough and shortness of breath.    Cardiovascular:  Negative for chest pain.   Gastrointestinal:  Positive for blood in stool. Negative for abdominal pain, diarrhea and nausea.   Musculoskeletal:  Positive for arthralgias and gait problem.   Skin:  Negative for wound.   Neurological:  Positive for weakness.   Psychiatric/Behavioral:   Positive for confusion. The patient is nervous/anxious.        Objective  /78   Pulse 88     Physical Exam  Constitutional:       General: She is not in acute distress.     Appearance: Normal appearance. She is normal weight. She is not ill-appearing.   HENT:      Head: Normocephalic.   Eyes:      Conjunctiva/sclera: Conjunctivae normal.   Cardiovascular:      Rate and Rhythm: Normal rate and regular rhythm.      Pulses: Normal pulses.      Heart sounds: Normal heart sounds.   Pulmonary:      Breath sounds: Normal breath sounds. No rales.   Abdominal:      General: Abdomen is flat.      Palpations: Abdomen is soft.   Musculoskeletal:         General: tenderness present.      Cervical back: Neck supple. No tenderness.   Skin:     General: Skin is warm and dry.   Neurological:      Mental Status: She is oriented to person, place, and time. Mental status is at baseline.   Psychiatric:         Mood and Affect: Mood normal.         Cognition and Memory: Cognition is impaired.     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  Patient was stable and daughter was upset because etiology of bleeding was never clear.  Patient's medications include Xanax, amlodipine, atorvastatin, buspirone, donepezil, losartan, melatonin, memantine, pantoprazole, ropinirole, sertraline 200 mg.  Having listed intense  amount of anxiolytics and antidepressants patient must have a significant history of depression and anxiety.  Hemoglobin will be checked, expect hemoglobin to be around 11, creatinine will be checked.  Diet has been advanced.  Physical therapy, Occupational Therapy evaluation are in process, other routine safety precautions has to be taken as per the facility protocol.  Last time patient has uneventful stay here, cognitive functions are moderate Chris severely impaired.  History of atrial fibrillation but not on any anticoagulation, no angina.  Routine care precautions and other safety precautions has to be taken as per the facility protocol, discussed with daughter, unless further GI bleeding happens she should be doing fine, diverticular bleed or small intestinal bleed or angiodysplasia or AV malformation induced GI bleeding's cannot be ruled out.  Stable condition, and altered hemodynamics will be attended to immediately.  Patient is expected to be here for short-term skilled nursing and rehabilitation.     Goals    None           Electronically Signed By: Abilio Davis MD   8/24/24  2:38 PM

## 2024-08-24 VITALS — DIASTOLIC BLOOD PRESSURE: 78 MMHG | HEART RATE: 88 BPM | SYSTOLIC BLOOD PRESSURE: 116 MMHG

## 2024-08-24 ASSESSMENT — ENCOUNTER SYMPTOMS
BLOOD IN STOOL: 1
SHORTNESS OF BREATH: 0
COUGH: 0
NERVOUS/ANXIOUS: 1
ARTHRALGIAS: 1
WEAKNESS: 1
APPETITE CHANGE: 1
ACTIVITY CHANGE: 1
APNEA: 0
WOUND: 0
DIARRHEA: 0
FATIGUE: 1
CONFUSION: 1
ABDOMINAL PAIN: 0
NAUSEA: 0

## 2024-08-24 NOTE — PROGRESS NOTES
Subjective   Patient ID: Jodi Nuñez is a 84 y.o. female who is acute skilled care and presents for initial visit for skilled nursing.    84-year-old female patient has been admitted again, few months ago she was admitted for fracture of patella, at that time she recovered well, she lives with her daughter, patient has a progressive cognitive impairment, patient has a previous history of carcinoma of the renal pelvis, patient has chronic anemia, patient has a chronic kidney disease.  This time patient started having GI bleeding, it was a black tarry stools.  Hemoglobin did not drop less than 11.  Upper endoscopy was done, hiatus hernia and mild changes of gastritis identified.  Patient did not require any blood transfusion or blood products.  Patient is back here in the facility, when I see this patient patient's daughter is present at bedside, I told daughter that I will review the data and information and get back to her.  CT scan of abdomen and pelvis showed diverticulosis, there is a unilateral nephrectomy.  As much as her daughter knows patient does not have any black tarry stools anymore.  Patient has history of coronary artery disease, hyperlipidemia, chronic depression anxiety.  She has been a smoker.  She was somewhat anxious but sitting upright, did not talk much most of the communication was done with patient's daughter.  All other reports and information were reviewed.         Review of Systems   Constitutional:  Positive for activity change, appetite change and fatigue.   Respiratory:  Negative for apnea, cough and shortness of breath.    Cardiovascular:  Negative for chest pain.   Gastrointestinal:  Positive for blood in stool. Negative for abdominal pain, diarrhea and nausea.   Musculoskeletal:  Positive for arthralgias and gait problem.   Skin:  Negative for wound.   Neurological:  Positive for weakness.   Psychiatric/Behavioral:  Positive for confusion. The patient is nervous/anxious.         Objective   /78   Pulse 88     Physical Exam  Constitutional:       General: She is not in acute distress.     Appearance: Normal appearance. She is normal weight. She is not ill-appearing.   HENT:      Head: Normocephalic.   Eyes:      Conjunctiva/sclera: Conjunctivae normal.   Cardiovascular:      Rate and Rhythm: Normal rate and regular rhythm.      Pulses: Normal pulses.      Heart sounds: Normal heart sounds.   Pulmonary:      Breath sounds: Normal breath sounds. No rales.   Abdominal:      General: Abdomen is flat.      Palpations: Abdomen is soft.   Musculoskeletal:         General: tenderness present.      Cervical back: Neck supple. No tenderness.   Skin:     General: Skin is warm and dry.   Neurological:      Mental Status: She is oriented to person, place, and time. Mental status is at baseline.   Psychiatric:         Mood and Affect: Mood normal.         Cognition and Memory: Cognition is impaired.     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  Patient was stable and daughter was upset because etiology of bleeding was never clear.  Patient's medications include Xanax, amlodipine, atorvastatin, buspirone, donepezil, losartan, melatonin, memantine, pantoprazole, ropinirole, sertraline 200 mg.  Having listed intense amount of anxiolytics and antidepressants patient must  have a significant history of depression and anxiety.  Hemoglobin will be checked, expect hemoglobin to be around 11, creatinine will be checked.  Diet has been advanced.  Physical therapy, Occupational Therapy evaluation are in process, other routine safety precautions has to be taken as per the facility protocol.  Last time patient has uneventful stay here, cognitive functions are moderate Chris severely impaired.  History of atrial fibrillation but not on any anticoagulation, no angina.  Routine care precautions and other safety precautions has to be taken as per the facility protocol, discussed with daughter, unless further GI bleeding happens she should be doing fine, diverticular bleed or small intestinal bleed or angiodysplasia or AV malformation induced GI bleeding's cannot be ruled out.  Stable condition, and altered hemodynamics will be attended to immediately.  Patient is expected to be here for short-term skilled nursing and rehabilitation.     Goals    None

## 2024-08-27 ENCOUNTER — NURSING HOME VISIT (OUTPATIENT)
Dept: POST ACUTE CARE | Facility: EXTERNAL LOCATION | Age: 84
End: 2024-08-27
Payer: MEDICARE

## 2024-08-27 ENCOUNTER — SOCIAL WORK (OUTPATIENT)
Dept: CASE MANAGEMENT | Facility: HOSPITAL | Age: 84
End: 2024-08-27
Payer: MEDICARE

## 2024-08-27 DIAGNOSIS — I10 ESSENTIAL HYPERTENSION: ICD-10-CM

## 2024-08-27 DIAGNOSIS — F41.1 GENERALIZED ANXIETY DISORDER: ICD-10-CM

## 2024-08-27 DIAGNOSIS — F32.9 MAJOR DEPRESSIVE DISORDER, REMISSION STATUS UNSPECIFIED, UNSPECIFIED WHETHER RECURRENT: ICD-10-CM

## 2024-08-27 DIAGNOSIS — H40.9 GLAUCOMA OF BOTH EYES, UNSPECIFIED GLAUCOMA TYPE: ICD-10-CM

## 2024-08-27 DIAGNOSIS — K92.2 GASTROINTESTINAL HEMORRHAGE, UNSPECIFIED GASTROINTESTINAL HEMORRHAGE TYPE: Primary | ICD-10-CM

## 2024-08-27 DIAGNOSIS — Z74.09 IMPAIRED FUNCTIONAL MOBILITY, BALANCE, AND ENDURANCE: ICD-10-CM

## 2024-08-27 PROCEDURE — 99310 SBSQ NF CARE HIGH MDM 45: CPT | Performed by: PHYSICIAN ASSISTANT

## 2024-08-27 NOTE — PROGRESS NOTES
Social Work Note  8/27/2024 SW checked with SNF regarding her appointment on 8/30 with Dr. Winters.  They were aware of the appointment and daughter is bringing her.  Team notified.  Patient is at [facility] in [place] #[phone].  SW faxed current appointment schedule to SNF.  SW will remain available to assist patient.  Sarah Oneal, MSW, LSW    independent

## 2024-08-27 NOTE — LETTER
"Patient: Jodi Nuñez  : 1940    Encounter Date: 2024  Name: Jodi Nuñez  YOB: 1940    Chief complaint: Melena. Gastritis with no active bleed.    HPI: This is a 84 year old  female who has a medical history remarkable for anxiety disorder, Alzheimer's, Vitamin D deficiency, CAD, glaucoma, atrial fibrillation, essential tremors, stage 3a CKD, nephrectomy, OA, hyperlipidemia, COPD, and tobacco use. Patient present to the ER from home with complaint of fatigue, poor appetite, and difficulty with ambulation. Patient endorse black and loose stools x 3 days. Lab work showed a Hgb of 11.1. Urinalysis showed evidence of UTI. Patient was started on Ceftriaxone. She also received IVF's for JEFF. She was seen by GI, had EGD done that showed gastritis, duodenitis, 4cm hiatal hernia, no ulcerations, no active bleeding, had biopsies taken to r/o h. Pylori. She was started on PPI and discharged to SNF for rehab.    GI Problem  The primary symptoms include fatigue, melena and dysuria. Primary symptoms do not include fever, abdominal pain, nausea, vomiting, diarrhea, hematemesis, jaundice or hematochezia. The illness began more than 7 days ago. The onset was gradual. The problem has been resolved.   The illness does not include chills, anorexia, dysphagia or constipation.     Review of systems:   ROS negative except were noted in HPI.    Code Status: full code    /60   Pulse 63   Temp 36.8 °C (98.2 °F)   Resp 16   Ht 1.549 m (5' 1\")   Wt 53.5 kg (118 lb)   SpO2 93%   BMI 22.30 kg/m²      Physical Exam  Constitutional:       General: She is not in acute distress.  HENT:      Head: Normocephalic.      Nose: Nose normal.      Mouth/Throat:      Mouth: Mucous membranes are moist.   Eyes:      Extraocular Movements: Extraocular movements intact.      Pupils: Pupils are equal, round, and reactive to light.   Cardiovascular:      Rate and Rhythm: Normal rate and regular rhythm. "      Pulses: Normal pulses.   Pulmonary:      Effort: Pulmonary effort is normal.      Breath sounds: Normal breath sounds.   Abdominal:      General: Bowel sounds are normal. There is no distension.      Palpations: Abdomen is soft.      Tenderness: There is no abdominal tenderness. There is no guarding.   Genitourinary:     Comments: Voiding  Musculoskeletal:         General: Normal range of motion.      Cervical back: Normal range of motion.   Lymphadenopathy:      Cervical: No cervical adenopathy.   Skin:     General: Skin is warm and dry.      Capillary Refill: Capillary refill takes less than 2 seconds.   Neurological:      Mental Status: She is alert. Mental status is at baseline.   Psychiatric:         Mood and Affect: Mood normal.         Behavior: Behavior normal.        Medications reviewed during visit at facility.  Amlodipine 5 mg po daily  Atorvastatin 40 mg po daily  Lumigan 0.01 one drop both eyes daily  Memantine 10 mg po daily  Buspirone 10 mg po tid  Tylenol 650 mg po q 4 hours prn  MOM 30 ml po daily prn  Melatonin 10 mg po q hs  Alprazolam 0.5 mg po tid prn  Sertraline 100 mg po daily  Pantoprazole 40 mg po daily   Vitamin D3 1.25 mg po q week  Ropinirole 0.5 mg po q hs  Labs reviewed at facility:   Laboratory Service Report [id number]   Patient Name   THERON BERGER  Patient ID   [id number]  Age   84 Y  Gender   F  Order #      Ordering CINDY Kapoor  Patient Telephone #   [phone]     1940  AKA      Client Order #   [id number]   Collection Date and Time   2024 09:58   Print Date and Time   2024 23:57  Account Information   [facility] NR   [address] [place] [address]     Report Notes                  Test Results   Reference Perform  Unit  Value Site*             CBC and Differential  REPORTED 2024 15:04  White Blood Cell Count H 12.46 k/uL 3.70-11.00    RBC L 3.60 m/uL 3.90-5.20    Hemoglobin L 10.8 g/dL 11.5-15.5     Hematocrit L 33.6 % 36.0-46.0    MCV   93.3 fL 80.0-100.0    MCH   30.0 pg 26.0-34.0    MCHC   32.1 g/dL 30.5-36.0    RDW-CV H 15.8 % 11.5-15.0    Platelet Count   278 k/uL 150-400    MPV   11.5 fL 9.0-12.7    Neut%   82.5 %    Abs Neut H 10.27 k/uL 1.45-7.50    Lymph%   8.8 %    Abs Lymph   1.10 k/uL 1.00-4.00    Mono%   5.1 %    Abs Mono   0.64 k/uL <0.87    Eosin%   1.0 %    Abs Eosin   0.13 k/uL <0.46    Baso%   0.9 %    Abs Baso H 0.11 k/uL <0.11    Immature Gran %%   1.7 %    Abs Immature Gran H 0.21 k/uL <0.10    NRBC   0.0 /100 WBC    Absolute nRBC   <0.01 k/uL <0.01    Diff Type   Auto               Comp Metabolic Panel  REPORTED 08/26/2024 15:20  Protein, Total   6.6 g/dL 6.3-8.0    Albumin L 3.4 g/dL 3.9-4.9    Calcium, Total   9.2 mg/dL 8.5-10.2    Bilirubin, Total   0.3 mg/dL 0.2-1.3    Alkaline Phosphatase   81 U/L     AST   13 U/L 13-35    ALT   7 U/L 7-38    Glucose H 122 mg/dL 74-99  BUN   20 mg/dL 7-21    Creatinine H 1.24 mg/dL 0.58-0.96    Sodium   140 mmol/L 136-144    Potassium   4.2 mmol/L 3.7-5.1    Chloride   107 mmol/L     CO2 L 20 mmol/L 22-30    Anion Gap   13 mmol/L 8-15    Estimated Glomerular Filtration Rate L 43 mL/min/1.73 meters squared >=60    Assessment/Plan   Problem List Items Addressed This Visit       Essential hypertension     Review BP readings. Stable on Amlodipine 5 mg po daily         Generalized anxiety disorder     Calm and cooperative today. Continue with Alprazolam 0.5 mg po tid prn. Buspirone 10 mg po tid         Glaucoma of both eyes     Lumigan 0.01 one drop both eyes daily         Impaired functional mobility, balance, and endurance     PT and OT to assess and treat.         Gastrointestinal hemorrhage, unspecified gastrointestinal hemorrhage type - Primary     Review lab work. Pantoprazole 40 mg po daily. Order CMP an CBC with diff on Mondays. Follow up with GI, Dr. Garcia on 11/26/24          Major depressive disorder     Sertraline 100 mg po daily.              Time:  I spent 45 minutes or greater with the patient. Greater than 50% of this time was spent in counseling and or coordination of care. The time includes prep time of reviewing vital signs, report from direct nursing staff and or therapists, hospital documentation, reviewing labs, radiographs, diagnostic tests and or consultations, time directly spent with the patient interviewing, examining, and education regarding diagnosis, treatments, and medications, as well as documentation in the electronic medical record, and reviewing the plan of care and any new orders with the patient, nursing staff and other staff directly related to the patients care.      Hakeem Lloyd PA-C       Electronically Signed By: Hakeem Lloyd PA-C   8/29/24 12:10 AM

## 2024-08-28 VITALS
RESPIRATION RATE: 16 BRPM | TEMPERATURE: 98.2 F | SYSTOLIC BLOOD PRESSURE: 142 MMHG | WEIGHT: 118 LBS | OXYGEN SATURATION: 93 % | BODY MASS INDEX: 22.28 KG/M2 | HEIGHT: 61 IN | HEART RATE: 63 BPM | DIASTOLIC BLOOD PRESSURE: 60 MMHG

## 2024-08-28 LAB
LABORATORY COMMENT REPORT: NORMAL
PATH REPORT.FINAL DX SPEC: NORMAL
PATH REPORT.GROSS SPEC: NORMAL
PATH REPORT.TOTAL CANCER: NORMAL

## 2024-08-29 PROBLEM — F32.9 MAJOR DEPRESSIVE DISORDER: Status: ACTIVE | Noted: 2024-08-29

## 2024-08-29 ASSESSMENT — ENCOUNTER SYMPTOMS
DIARRHEA: 0
FATIGUE: 1
NAUSEA: 0
FEVER: 0
HEMATOCHEZIA: 0
ANOREXIA: 0
ABDOMINAL PAIN: 0
CONSTIPATION: 0
VOMITING: 0
CHILLS: 0
JAUNDICE: 0
HEMATEMESIS: 0
DYSURIA: 1

## 2024-08-29 NOTE — PROGRESS NOTES
"8/27/2024  Name: Jodi Nuñez  YOB: 1940    Chief complaint: Melena. Gastritis with no active bleed.    HPI: This is a 84 year old  female who has a medical history remarkable for anxiety disorder, Alzheimer's, Vitamin D deficiency, CAD, glaucoma, atrial fibrillation, essential tremors, stage 3a CKD, nephrectomy, OA, hyperlipidemia, COPD, and tobacco use. Patient present to the ER from home with complaint of fatigue, poor appetite, and difficulty with ambulation. Patient endorse black and loose stools x 3 days. Lab work showed a Hgb of 11.1. Urinalysis showed evidence of UTI. Patient was started on Ceftriaxone. She also received IVF's for JEFF. She was seen by GI, had EGD done that showed gastritis, duodenitis, 4cm hiatal hernia, no ulcerations, no active bleeding, had biopsies taken to r/o h. Pylori. She was started on PPI and discharged to SNF for rehab.    GI Problem  The primary symptoms include fatigue, melena and dysuria. Primary symptoms do not include fever, abdominal pain, nausea, vomiting, diarrhea, hematemesis, jaundice or hematochezia. The illness began more than 7 days ago. The onset was gradual. The problem has been resolved.   The illness does not include chills, anorexia, dysphagia or constipation.     Review of systems:   ROS negative except were noted in HPI.    Code Status: full code    /60   Pulse 63   Temp 36.8 °C (98.2 °F)   Resp 16   Ht 1.549 m (5' 1\")   Wt 53.5 kg (118 lb)   SpO2 93%   BMI 22.30 kg/m²      Physical Exam  Constitutional:       General: She is not in acute distress.  HENT:      Head: Normocephalic.      Nose: Nose normal.      Mouth/Throat:      Mouth: Mucous membranes are moist.   Eyes:      Extraocular Movements: Extraocular movements intact.      Pupils: Pupils are equal, round, and reactive to light.   Cardiovascular:      Rate and Rhythm: Normal rate and regular rhythm.      Pulses: Normal pulses.   Pulmonary:      Effort: Pulmonary " effort is normal.      Breath sounds: Normal breath sounds.   Abdominal:      General: Bowel sounds are normal. There is no distension.      Palpations: Abdomen is soft.      Tenderness: There is no abdominal tenderness. There is no guarding.   Genitourinary:     Comments: Voiding  Musculoskeletal:         General: Normal range of motion.      Cervical back: Normal range of motion.   Lymphadenopathy:      Cervical: No cervical adenopathy.   Skin:     General: Skin is warm and dry.      Capillary Refill: Capillary refill takes less than 2 seconds.   Neurological:      Mental Status: She is alert. Mental status is at baseline.   Psychiatric:         Mood and Affect: Mood normal.         Behavior: Behavior normal.        Medications reviewed during visit at facility.  Amlodipine 5 mg po daily  Atorvastatin 40 mg po daily  Lumigan 0.01 one drop both eyes daily  Memantine 10 mg po daily  Buspirone 10 mg po tid  Tylenol 650 mg po q 4 hours prn  MOM 30 ml po daily prn  Melatonin 10 mg po q hs  Alprazolam 0.5 mg po tid prn  Sertraline 100 mg po daily  Pantoprazole 40 mg po daily   Vitamin D3 1.25 mg po q week  Ropinirole 0.5 mg po q hs  Labs reviewed at facility:   Laboratory Service Report [id number]   Patient Name   THERON BERGER  Patient ID   [id number]  Age   84 Y  Gender   F  Order #      Ordering CINDY Kapoor  Patient Telephone #   [phone]     1940  AKA      Client Order #   [id number]   Collection Date and Time   2024 09:58   Print Date and Time   2024 23:57  Account Information   [facility] NR   [address] [place] [address]     Report Notes                  Test Results   Reference Perform  Unit  Value Site*             CBC and Differential  REPORTED 2024 15:04  White Blood Cell Count H 12.46 k/uL 3.70-11.00    RBC L 3.60 m/uL 3.90-5.20    Hemoglobin L 10.8 g/dL 11.5-15.5    Hematocrit L 33.6 % 36.0-46.0    MCV   93.3 fL 80.0-100.0    MCH    30.0 pg 26.0-34.0    MCHC   32.1 g/dL 30.5-36.0    RDW-CV H 15.8 % 11.5-15.0    Platelet Count   278 k/uL 150-400    MPV   11.5 fL 9.0-12.7    Neut%   82.5 %    Abs Neut H 10.27 k/uL 1.45-7.50    Lymph%   8.8 %    Abs Lymph   1.10 k/uL 1.00-4.00    Mono%   5.1 %    Abs Mono   0.64 k/uL <0.87    Eosin%   1.0 %    Abs Eosin   0.13 k/uL <0.46    Baso%   0.9 %    Abs Baso H 0.11 k/uL <0.11    Immature Gran %%   1.7 %    Abs Immature Gran H 0.21 k/uL <0.10    NRBC   0.0 /100 WBC    Absolute nRBC   <0.01 k/uL <0.01    Diff Type   Auto               Comp Metabolic Panel  REPORTED 08/26/2024 15:20  Protein, Total   6.6 g/dL 6.3-8.0    Albumin L 3.4 g/dL 3.9-4.9    Calcium, Total   9.2 mg/dL 8.5-10.2    Bilirubin, Total   0.3 mg/dL 0.2-1.3    Alkaline Phosphatase   81 U/L     AST   13 U/L 13-35    ALT   7 U/L 7-38    Glucose H 122 mg/dL 74-99  BUN   20 mg/dL 7-21    Creatinine H 1.24 mg/dL 0.58-0.96    Sodium   140 mmol/L 136-144    Potassium   4.2 mmol/L 3.7-5.1    Chloride   107 mmol/L     CO2 L 20 mmol/L 22-30    Anion Gap   13 mmol/L 8-15    Estimated Glomerular Filtration Rate L 43 mL/min/1.73 meters squared >=60    Assessment/Plan    Problem List Items Addressed This Visit       Essential hypertension     Review BP readings. Stable on Amlodipine 5 mg po daily         Generalized anxiety disorder     Calm and cooperative today. Continue with Alprazolam 0.5 mg po tid prn. Buspirone 10 mg po tid         Glaucoma of both eyes     Lumigan 0.01 one drop both eyes daily         Impaired functional mobility, balance, and endurance     PT and OT to assess and treat.         Gastrointestinal hemorrhage, unspecified gastrointestinal hemorrhage type - Primary     Review lab work. Pantoprazole 40 mg po daily. Order CMP an CBC with diff on Mondays. Follow up with GI, Dr. Garcia on 11/26/24          Major depressive disorder     Sertraline 100 mg po daily.             Time:  I spent 45 minutes or greater with the patient.  Greater than 50% of this time was spent in counseling and or coordination of care. The time includes prep time of reviewing vital signs, report from direct nursing staff and or therapists, hospital documentation, reviewing labs, radiographs, diagnostic tests and or consultations, time directly spent with the patient interviewing, examining, and education regarding diagnosis, treatments, and medications, as well as documentation in the electronic medical record, and reviewing the plan of care and any new orders with the patient, nursing staff and other staff directly related to the patients care.      Hakeem Lloyd PA-C

## 2024-08-29 NOTE — ASSESSMENT & PLAN NOTE
Review lab work. Pantoprazole 40 mg po daily. Order CMP an CBC with diff on Mondays. Follow up with GI, Dr. Garcia on 11/26/24

## 2024-08-30 ENCOUNTER — APPOINTMENT (OUTPATIENT)
Dept: HEMATOLOGY/ONCOLOGY | Facility: CLINIC | Age: 84
End: 2024-08-30
Payer: MEDICARE

## 2024-09-06 DIAGNOSIS — F41.1 GENERALIZED ANXIETY DISORDER: Primary | ICD-10-CM

## 2024-09-06 RX ORDER — ALPRAZOLAM 0.5 MG/1
0.5 TABLET ORAL 3 TIMES DAILY PRN
Qty: 90 TABLET | Refills: 0 | Status: SHIPPED | OUTPATIENT
Start: 2024-09-06 | End: 2024-10-06

## 2024-09-06 NOTE — TELEPHONE ENCOUNTER
Rx Refill Request Telephone Encounter    Name:  Jodi Nuñez  :  [id number]  Medication Name:  Alprazolam (Xanax) 0.5 mg   Specific Pharmacy location:  [facility]   Date of last appointment:    Date of next appointment:    Best number to reach patient:  [phone]

## 2024-09-17 NOTE — PROGRESS NOTES
"Subjective   Patient ID: Jodi Nuñez is a 84 y.o. female who presents for Anxiety and UTI.  HPI    Would like a urine test to see if her UTI    Anxiety improved trying to decrease use of Ativan    Alzheimer's stable  Gets help at home    Coronary disease no angina keep follow-up with cardiology    Hypertension stable  Tolerates medicine    GERD stable no red flag symptoms    High cholesterol stable watch diet follow blood work    Low vitamin D recommend replace    A-fib stable keep cardiac follow-up    Renal cancer stable keep follow-up with urology specialist      Review of Systems   Constitutional:  Negative for activity change and fatigue.   HENT:  Negative for congestion and sore throat.    Eyes:  Negative for discharge.   Respiratory:  Negative for cough, chest tightness and shortness of breath.    Cardiovascular:  Negative for chest pain and leg swelling.   Gastrointestinal:  Negative for abdominal pain, blood in stool, constipation, diarrhea, nausea and vomiting.   Endocrine: Negative for cold intolerance and heat intolerance.   Genitourinary:  Negative for difficulty urinating and hematuria.   Musculoskeletal:  Negative for arthralgias, back pain, gait problem, myalgias and neck pain.   Allergic/Immunologic: Negative for environmental allergies.   Neurological:  Negative for dizziness, syncope, weakness, numbness and headaches.   Hematological:  Negative for adenopathy. Does not bruise/bleed easily.   Psychiatric/Behavioral:  Negative for dysphoric mood. The patient is not nervous/anxious.    All other systems reviewed and are negative.      Objective   /74 (BP Location: Left arm, BP Cuff Size: Adult)   Pulse 67   Ht 1.549 m (5' 1\")   Wt 57.4 kg (126 lb 9.6 oz)   SpO2 96%   BMI 23.92 kg/m²    Physical Exam  Vitals and nursing note reviewed.   Constitutional:       General: She is not in acute distress.     Appearance: Normal appearance.   HENT:      Head: Normocephalic and atraumatic.      Right " Ear: Tympanic membrane, ear canal and external ear normal.      Left Ear: Tympanic membrane, ear canal and external ear normal.      Nose: Nose normal.      Mouth/Throat:      Mouth: Mucous membranes are moist.      Pharynx: Oropharynx is clear. No oropharyngeal exudate or posterior oropharyngeal erythema.   Eyes:      Extraocular Movements: Extraocular movements intact.      Conjunctiva/sclera: Conjunctivae normal.      Pupils: Pupils are equal, round, and reactive to light.   Cardiovascular:      Rate and Rhythm: Normal rate and regular rhythm.      Pulses: Normal pulses.      Heart sounds: Normal heart sounds. No murmur heard.  Pulmonary:      Effort: Pulmonary effort is normal. No respiratory distress.      Breath sounds: Normal breath sounds. No wheezing or rales.   Abdominal:      General: Abdomen is flat. Bowel sounds are normal. There is no distension.      Palpations: Abdomen is soft. There is no mass.      Tenderness: There is no abdominal tenderness.   Musculoskeletal:         General: No swelling or deformity. Normal range of motion.      Cervical back: Normal range of motion and neck supple.      Right lower leg: No edema.      Left lower leg: No edema.   Lymphadenopathy:      Cervical: No cervical adenopathy.   Skin:     General: Skin is warm and dry.      Capillary Refill: Capillary refill takes less than 2 seconds.      Findings: No lesion or rash.   Neurological:      General: No focal deficit present.      Mental Status: She is alert and oriented to person, place, and time.      Cranial Nerves: No cranial nerve deficit.      Motor: No weakness.   Psychiatric:         Mood and Affect: Mood normal.         Behavior: Behavior normal.         Thought Content: Thought content normal.         Judgment: Judgment normal.         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)    Restless leg syndrome    Relevant Medications    rOPINIRole (Requip) 0.5 mg tablet    Vitamin D deficiency    Relevant Medications    cholecalciferol (Vitamin D-3) 50,000 unit capsule    Moderate late onset Alzheimer's dementia without behavioral disturbance, psychotic disturbance, mood disturbance, or anxiety (Multi)    Relevant Medications    memantine (Namenda) 10 mg tablet    donepezil (Aricept) 10 mg tablet     Other Visit Diagnoses       Anxiety        Gastroesophageal reflux disease without esophagitis        Relevant Medications    pantoprazole (ProtoNix) 40 mg EC tablet    Dysuria        Relevant Orders    Urine Culture    Urine Culture            Patient education provided.  Stay current with age appropriate health maintenance as instructed.  Appointment here or ER with new or worsening symptoms'  Keep appropriate follow-up visit.  Stay current with proper immunizations   Discussed at length with patient and daughter  Recheck 3 months  Refills as above  Testing as above

## 2024-09-20 ENCOUNTER — APPOINTMENT (OUTPATIENT)
Dept: PRIMARY CARE | Facility: CLINIC | Age: 84
End: 2024-09-20
Payer: MEDICARE

## 2024-09-20 VITALS
DIASTOLIC BLOOD PRESSURE: 74 MMHG | SYSTOLIC BLOOD PRESSURE: 130 MMHG | BODY MASS INDEX: 23.9 KG/M2 | WEIGHT: 126.6 LBS | HEIGHT: 61 IN | OXYGEN SATURATION: 96 % | HEART RATE: 67 BPM

## 2024-09-20 DIAGNOSIS — E78.2 MIXED HYPERLIPIDEMIA: ICD-10-CM

## 2024-09-20 DIAGNOSIS — I25.10 CORONARY ARTERY DISEASE INVOLVING NATIVE CORONARY ARTERY OF NATIVE HEART WITHOUT ANGINA PECTORIS: Primary | ICD-10-CM

## 2024-09-20 DIAGNOSIS — K21.9 GASTROESOPHAGEAL REFLUX DISEASE WITHOUT ESOPHAGITIS: ICD-10-CM

## 2024-09-20 DIAGNOSIS — G25.81 RESTLESS LEG SYNDROME: ICD-10-CM

## 2024-09-20 DIAGNOSIS — R30.0 DYSURIA: ICD-10-CM

## 2024-09-20 DIAGNOSIS — E55.9 VITAMIN D DEFICIENCY: ICD-10-CM

## 2024-09-20 DIAGNOSIS — C65.1 MALIGNANT NEOPLASM OF RIGHT RENAL PELVIS: ICD-10-CM

## 2024-09-20 DIAGNOSIS — I10 ESSENTIAL HYPERTENSION: ICD-10-CM

## 2024-09-20 DIAGNOSIS — F41.9 ANXIETY: ICD-10-CM

## 2024-09-20 DIAGNOSIS — F41.1 GENERALIZED ANXIETY DISORDER: ICD-10-CM

## 2024-09-20 DIAGNOSIS — G30.1 MODERATE LATE ONSET ALZHEIMER'S DEMENTIA WITHOUT BEHAVIORAL DISTURBANCE, PSYCHOTIC DISTURBANCE, MOOD DISTURBANCE, OR ANXIETY (MULTI): ICD-10-CM

## 2024-09-20 DIAGNOSIS — I48.0 PAROXYSMAL ATRIAL FIBRILLATION (MULTI): ICD-10-CM

## 2024-09-20 DIAGNOSIS — F02.B0 MODERATE LATE ONSET ALZHEIMER'S DEMENTIA WITHOUT BEHAVIORAL DISTURBANCE, PSYCHOTIC DISTURBANCE, MOOD DISTURBANCE, OR ANXIETY (MULTI): ICD-10-CM

## 2024-09-20 PROCEDURE — 1160F RVW MEDS BY RX/DR IN RCRD: CPT | Performed by: FAMILY MEDICINE

## 2024-09-20 PROCEDURE — 99214 OFFICE O/P EST MOD 30 MIN: CPT | Performed by: FAMILY MEDICINE

## 2024-09-20 PROCEDURE — 1159F MED LIST DOCD IN RCRD: CPT | Performed by: FAMILY MEDICINE

## 2024-09-20 PROCEDURE — 3075F SYST BP GE 130 - 139MM HG: CPT | Performed by: FAMILY MEDICINE

## 2024-09-20 PROCEDURE — 3078F DIAST BP <80 MM HG: CPT | Performed by: FAMILY MEDICINE

## 2024-09-20 PROCEDURE — 1111F DSCHRG MED/CURRENT MED MERGE: CPT | Performed by: FAMILY MEDICINE

## 2024-09-20 RX ORDER — LOSARTAN POTASSIUM 25 MG/1
25 TABLET ORAL 2 TIMES DAILY
Qty: 180 TABLET | Refills: 3 | Status: SHIPPED | OUTPATIENT
Start: 2024-09-20

## 2024-09-20 RX ORDER — SERTRALINE HYDROCHLORIDE 100 MG/1
100 TABLET, FILM COATED ORAL 2 TIMES DAILY
Qty: 60 TABLET | Refills: 0 | Status: SHIPPED | OUTPATIENT
Start: 2024-09-20

## 2024-09-20 RX ORDER — ATORVASTATIN CALCIUM 40 MG/1
40 TABLET, FILM COATED ORAL DAILY
Qty: 90 TABLET | Refills: 3 | Status: SHIPPED | OUTPATIENT
Start: 2024-09-20

## 2024-09-20 RX ORDER — ASPIRIN 325 MG
50000 TABLET, DELAYED RELEASE (ENTERIC COATED) ORAL
Qty: 12 CAPSULE | Refills: 3 | Status: SHIPPED | OUTPATIENT
Start: 2024-09-22

## 2024-09-20 RX ORDER — ROPINIROLE 0.5 MG/1
0.5 TABLET, FILM COATED ORAL NIGHTLY
Qty: 90 TABLET | Refills: 3 | Status: SHIPPED | OUTPATIENT
Start: 2024-09-20 | End: 2025-09-15

## 2024-09-20 RX ORDER — PANTOPRAZOLE SODIUM 40 MG/1
40 TABLET, DELAYED RELEASE ORAL DAILY
Qty: 90 TABLET | Refills: 3 | Status: SHIPPED | OUTPATIENT
Start: 2024-09-20

## 2024-09-20 RX ORDER — MEMANTINE HYDROCHLORIDE 10 MG/1
10 TABLET ORAL 2 TIMES DAILY
Qty: 180 TABLET | Refills: 3 | Status: SHIPPED | OUTPATIENT
Start: 2024-09-20

## 2024-09-20 RX ORDER — DONEPEZIL HYDROCHLORIDE 10 MG/1
10 TABLET, FILM COATED ORAL NIGHTLY
Qty: 90 TABLET | Refills: 3 | Status: SHIPPED | OUTPATIENT
Start: 2024-09-20

## 2024-09-20 ASSESSMENT — ENCOUNTER SYMPTOMS
HEADACHES: 0
ABDOMINAL PAIN: 0
SORE THROAT: 0
ACTIVITY CHANGE: 0
COUGH: 0
BLOOD IN STOOL: 0
NERVOUS/ANXIOUS: 0
DIARRHEA: 0
NAUSEA: 0
EYE DISCHARGE: 0
FATIGUE: 0
DIZZINESS: 0
WEAKNESS: 0
MYALGIAS: 0
NUMBNESS: 0
SHORTNESS OF BREATH: 0
CHEST TIGHTNESS: 0
ARTHRALGIAS: 0
VOMITING: 0
DIFFICULTY URINATING: 0
ADENOPATHY: 0
BRUISES/BLEEDS EASILY: 0
DYSPHORIC MOOD: 0
BACK PAIN: 0
NECK PAIN: 0
HEMATURIA: 0
CONSTIPATION: 0

## 2024-09-25 ENCOUNTER — LAB (OUTPATIENT)
Dept: LAB | Facility: LAB | Age: 84
End: 2024-09-25
Payer: MEDICARE

## 2024-09-25 DIAGNOSIS — R30.0 DYSURIA: ICD-10-CM

## 2024-09-25 PROCEDURE — 87086 URINE CULTURE/COLONY COUNT: CPT

## 2024-09-27 LAB — BACTERIA UR CULT: NORMAL

## 2024-10-04 RX ORDER — LIDOCAINE HYDROCHLORIDE 20 MG/ML
1 JELLY TOPICAL ONCE
Status: COMPLETED | OUTPATIENT
Start: 2024-10-08 | End: 2024-10-08

## 2024-10-07 ENCOUNTER — TELEPHONE (OUTPATIENT)
Dept: PRIMARY CARE | Facility: CLINIC | Age: 84
End: 2024-10-07
Payer: MEDICARE

## 2024-10-07 DIAGNOSIS — M54.50 CHRONIC BILATERAL LOW BACK PAIN WITHOUT SCIATICA: Primary | ICD-10-CM

## 2024-10-07 DIAGNOSIS — G89.29 CHRONIC BILATERAL LOW BACK PAIN WITHOUT SCIATICA: Primary | ICD-10-CM

## 2024-10-07 NOTE — TELEPHONE ENCOUNTER
Patient daughter called said her mom needs to have an referral for [facility] for Chronic bilateral low back pain. Please call when referral is done.

## 2024-10-08 ENCOUNTER — APPOINTMENT (OUTPATIENT)
Dept: UROLOGY | Facility: CLINIC | Age: 84
End: 2024-10-08
Payer: MEDICARE

## 2024-10-08 VITALS — TEMPERATURE: 98.8 F | DIASTOLIC BLOOD PRESSURE: 74 MMHG | SYSTOLIC BLOOD PRESSURE: 121 MMHG | HEART RATE: 68 BPM

## 2024-10-08 DIAGNOSIS — C65.1 MALIGNANT NEOPLASM OF RIGHT RENAL PELVIS: Primary | ICD-10-CM

## 2024-10-08 DIAGNOSIS — R31.9 HEMATURIA OF UNKNOWN CAUSE: ICD-10-CM

## 2024-10-08 DIAGNOSIS — D49.4 BLADDER TUMOR: ICD-10-CM

## 2024-10-08 PROCEDURE — 99213 OFFICE O/P EST LOW 20 MIN: CPT | Performed by: UROLOGY

## 2024-10-08 PROCEDURE — 52000 CYSTOURETHROSCOPY: CPT | Performed by: UROLOGY

## 2024-10-08 RX ORDER — CEFAZOLIN SODIUM 2 G/100ML
2 INJECTION, SOLUTION INTRAVENOUS ONCE
OUTPATIENT
Start: 2024-10-08 | End: 2024-10-08

## 2024-10-08 NOTE — PROGRESS NOTES
Subjective   Patient ID: Jodi Nuñez is a 84 y.o. female who presents for CYSTOSCOPY. Last seen 4/4/2024 when We will see the patient back in 6 months with a CT Urogram and a follow-up cystoscopy.     HPI  The patient is accompanied by her daughter.   Patient had a recent fall and injured her right kneecap. She did not need any surgical repair.     Patient denies hematuria.     CT A&P:8/20/2024  IMPRESSION:  Bladder is partially distended with mild wall thickening. Tiny locule  of air is noted in the urinary bladder. Correlate for history of any  recent instrumentation. In the absence of such history findings raise  concern for infectious cystitis. Correlate with urinalysis.    Status post right nephrectomy.    Review of Systems  A 12 system review was completed and is negative with the exception of those signs and symptoms noted in the history of present illness.    Objective   Physical Exam  General: in NAD, appears stated age  Head: normocephalic, atraumatic  Respiratory: normal effort, no use of accessory muscles  Cardiovascular: no edema noted  Skin: normal turgor, no rashes  Neurologic: grossly intact, oriented to person/place/time  Psychiatric: mode and affect appropriate     Procedure  Cystoscopy for TCC bladder  ?Patient's genitalia were prepped and draped in usual sterile fashion. A 17 K[place] flexible cystoscope was passed atraumatically per the urethra and the bladder was inspected.  There was a small 5 mm papillary recurrence around the right posterior wall . The left ureteral orifice was in its normal anatomic position and effluxing clear urine. The scope was retroflexed and the bladder neck was unremarkable. The patient tolerated the procedure well.       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      We discussed the cystoscopic findings. Patient will be scheduled for TURBT. Preadmission testing was ordered.     Scribe Attestation  By signing my name below, IKaylee Scribe   attest that this documentation has been prepared under the direction and in the presence of Bhupendra Gorman MD.

## 2024-10-08 NOTE — H&P (VIEW-ONLY)
Subjective   Patient ID: Jodi Nuñez is a 84 y.o. female who presents for CYSTOSCOPY. Last seen 4/4/2024 when We will see the patient back in 6 months with a CT Urogram and a follow-up cystoscopy.     HPI  The patient is accompanied by her daughter.   Patient had a recent fall and injured her right kneecap. She did not need any surgical repair.     Patient denies hematuria.     CT A&P:8/20/2024  IMPRESSION:  Bladder is partially distended with mild wall thickening. Tiny locule  of air is noted in the urinary bladder. Correlate for history of any  recent instrumentation. In the absence of such history findings raise  concern for infectious cystitis. Correlate with urinalysis.    Status post right nephrectomy.    Review of Systems  A 12 system review was completed and is negative with the exception of those signs and symptoms noted in the history of present illness.    Objective   Physical Exam  General: in NAD, appears stated age  Head: normocephalic, atraumatic  Respiratory: normal effort, no use of accessory muscles  Cardiovascular: no edema noted  Skin: normal turgor, no rashes  Neurologic: grossly intact, oriented to person/place/time  Psychiatric: mode and affect appropriate     Procedure  Cystoscopy for TCC bladder  ?Patient's genitalia were prepped and draped in usual sterile fashion. A 17 Lithuanian flexible cystoscope was passed atraumatically per the urethra and the bladder was inspected.  There was a small 5 mm papillary recurrence around the right posterior wall . The left ureteral orifice was in its normal anatomic position and effluxing clear urine. The scope was retroflexed and the bladder neck was unremarkable. The patient tolerated the procedure well.       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      We discussed the cystoscopic findings. Patient will be scheduled for TURBT. Preadmission testing was ordered.     Scribe Attestation  By signing my name below, IKaylee Scribe   attest that this documentation has been prepared under the direction and in the presence of Bhupendra Gorman MD.

## 2024-10-09 DIAGNOSIS — F41.1 GENERALIZED ANXIETY DISORDER: ICD-10-CM

## 2024-10-09 NOTE — TELEPHONE ENCOUNTER
Patient daughter called stating that patient is out of Xanax 0.5mg  Taken 3x daily PRN   Pharmacy DDM zara carranza     Medication last filled on 9/6/24  Patient NOV 12/17/24    Please advise

## 2024-10-10 RX ORDER — ALPRAZOLAM 0.5 MG/1
0.5 TABLET ORAL 3 TIMES DAILY PRN
Qty: 90 TABLET | Refills: 0 | Status: SHIPPED | OUTPATIENT
Start: 2024-10-10 | End: 2024-11-09

## 2024-10-16 ENCOUNTER — LAB (OUTPATIENT)
Dept: LAB | Facility: HOSPITAL | Age: 84
End: 2024-10-16
Payer: MEDICARE

## 2024-10-16 ENCOUNTER — HOSPITAL ENCOUNTER (OUTPATIENT)
Dept: CARDIOLOGY | Facility: HOSPITAL | Age: 84
Discharge: HOME | End: 2024-10-16
Payer: MEDICARE

## 2024-10-16 DIAGNOSIS — C65.1 MALIGNANT NEOPLASM OF RIGHT RENAL PELVIS: ICD-10-CM

## 2024-10-16 DIAGNOSIS — D49.4 BLADDER TUMOR: ICD-10-CM

## 2024-10-16 LAB
ALBUMIN SERPL BCP-MCNC: 4.7 G/DL (ref 3.4–5)
ALP SERPL-CCNC: 73 U/L (ref 33–136)
ALT SERPL W P-5'-P-CCNC: 8 U/L (ref 7–45)
ANION GAP SERPL CALC-SCNC: 14 MMOL/L (ref 10–20)
AST SERPL W P-5'-P-CCNC: 11 U/L (ref 9–39)
BILIRUB SERPL-MCNC: 0.5 MG/DL (ref 0–1.2)
BUN SERPL-MCNC: 20 MG/DL (ref 6–23)
CALCIUM SERPL-MCNC: 9.8 MG/DL (ref 8.6–10.3)
CHLORIDE SERPL-SCNC: 101 MMOL/L (ref 98–107)
CO2 SERPL-SCNC: 24 MMOL/L (ref 21–32)
CREAT SERPL-MCNC: 1.42 MG/DL (ref 0.5–1.05)
EGFRCR SERPLBLD CKD-EPI 2021: 37 ML/MIN/1.73M*2
ERYTHROCYTE [DISTWIDTH] IN BLOOD BY AUTOMATED COUNT: 17.1 % (ref 11.5–14.5)
GLUCOSE SERPL-MCNC: 92 MG/DL (ref 74–99)
HCT VFR BLD AUTO: 36.2 % (ref 36–46)
HGB BLD-MCNC: 11.6 G/DL (ref 12–16)
MCH RBC QN AUTO: 30.1 PG (ref 26–34)
MCHC RBC AUTO-ENTMCNC: 32 G/DL (ref 32–36)
MCV RBC AUTO: 94 FL (ref 80–100)
NRBC BLD-RTO: 0 /100 WBCS (ref 0–0)
PLATELET # BLD AUTO: 251 X10*3/UL (ref 150–450)
POTASSIUM SERPL-SCNC: 5 MMOL/L (ref 3.5–5.3)
PROT SERPL-MCNC: 7.3 G/DL (ref 6.4–8.2)
RBC # BLD AUTO: 3.86 X10*6/UL (ref 4–5.2)
SODIUM SERPL-SCNC: 134 MMOL/L (ref 136–145)
WBC # BLD AUTO: 10.1 X10*3/UL (ref 4.4–11.3)

## 2024-10-16 PROCEDURE — 36415 COLL VENOUS BLD VENIPUNCTURE: CPT

## 2024-10-16 PROCEDURE — 93010 ELECTROCARDIOGRAM REPORT: CPT | Performed by: INTERNAL MEDICINE

## 2024-10-16 PROCEDURE — 80053 COMPREHEN METABOLIC PANEL: CPT

## 2024-10-16 PROCEDURE — 85027 COMPLETE CBC AUTOMATED: CPT

## 2024-10-16 PROCEDURE — 93005 ELECTROCARDIOGRAM TRACING: CPT

## 2024-10-16 NOTE — PREPROCEDURE INSTRUCTIONS

## 2024-10-17 ENCOUNTER — LAB (OUTPATIENT)
Dept: LAB | Facility: HOSPITAL | Age: 84
End: 2024-10-17
Payer: MEDICARE

## 2024-10-17 DIAGNOSIS — R31.9 HEMATURIA OF UNKNOWN CAUSE: ICD-10-CM

## 2024-10-17 DIAGNOSIS — D49.4 BLADDER TUMOR: ICD-10-CM

## 2024-10-17 LAB
ATRIAL RATE: 57 BPM
P AXIS: 61 DEGREES
P OFFSET: 209 MS
P ONSET: 149 MS
PR INTERVAL: 146 MS
Q ONSET: 222 MS
QRS COUNT: 9 BEATS
QRS DURATION: 68 MS
QT INTERVAL: 402 MS
QTC CALCULATION(BAZETT): 391 MS
QTC FREDERICIA: 395 MS
R AXIS: 56 DEGREES
T AXIS: 120 DEGREES
T OFFSET: 423 MS
VENTRICULAR RATE: 57 BPM

## 2024-10-17 PROCEDURE — 87086 URINE CULTURE/COLONY COUNT: CPT | Mod: ELYLAB

## 2024-10-19 LAB — BACTERIA UR CULT: NORMAL

## 2024-10-23 ENCOUNTER — ANESTHESIA (OUTPATIENT)
Dept: OPERATING ROOM | Facility: HOSPITAL | Age: 84
End: 2024-10-23
Payer: MEDICARE

## 2024-10-23 ENCOUNTER — ANESTHESIA EVENT (OUTPATIENT)
Dept: OPERATING ROOM | Facility: HOSPITAL | Age: 84
End: 2024-10-23
Payer: MEDICARE

## 2024-10-23 ENCOUNTER — HOSPITAL ENCOUNTER (OUTPATIENT)
Facility: HOSPITAL | Age: 84
Setting detail: OUTPATIENT SURGERY
Discharge: HOME | End: 2024-10-23
Attending: UROLOGY | Admitting: UROLOGY
Payer: MEDICARE

## 2024-10-23 VITALS
HEIGHT: 61 IN | WEIGHT: 122.58 LBS | TEMPERATURE: 96.8 F | DIASTOLIC BLOOD PRESSURE: 72 MMHG | OXYGEN SATURATION: 98 % | RESPIRATION RATE: 18 BRPM | BODY MASS INDEX: 23.14 KG/M2 | SYSTOLIC BLOOD PRESSURE: 174 MMHG | HEART RATE: 63 BPM

## 2024-10-23 DIAGNOSIS — C65.1 MALIGNANT NEOPLASM OF RIGHT RENAL PELVIS: Primary | ICD-10-CM

## 2024-10-23 DIAGNOSIS — D49.4 BLADDER TUMOR: ICD-10-CM

## 2024-10-23 PROCEDURE — 7100000002 HC RECOVERY ROOM TIME - EACH INCREMENTAL 1 MINUTE: Performed by: UROLOGY

## 2024-10-23 PROCEDURE — 52234 CYSTOSCOPY AND TREATMENT: CPT | Performed by: UROLOGY

## 2024-10-23 PROCEDURE — 7100000001 HC RECOVERY ROOM TIME - INITIAL BASE CHARGE: Performed by: UROLOGY

## 2024-10-23 PROCEDURE — 3600000003 HC OR TIME - INITIAL BASE CHARGE - PROCEDURE LEVEL THREE: Performed by: UROLOGY

## 2024-10-23 PROCEDURE — 2500000005 HC RX 250 GENERAL PHARMACY W/O HCPCS: Performed by: UROLOGY

## 2024-10-23 PROCEDURE — 7100000009 HC PHASE TWO TIME - INITIAL BASE CHARGE: Performed by: UROLOGY

## 2024-10-23 PROCEDURE — 3700000001 HC GENERAL ANESTHESIA TIME - INITIAL BASE CHARGE: Performed by: UROLOGY

## 2024-10-23 PROCEDURE — 2720000007 HC OR 272 NO HCPCS: Performed by: UROLOGY

## 2024-10-23 PROCEDURE — 2500000004 HC RX 250 GENERAL PHARMACY W/ HCPCS (ALT 636 FOR OP/ED): Mod: JZ | Performed by: UROLOGY

## 2024-10-23 PROCEDURE — 7100000010 HC PHASE TWO TIME - EACH INCREMENTAL 1 MINUTE: Performed by: UROLOGY

## 2024-10-23 PROCEDURE — 3700000002 HC GENERAL ANESTHESIA TIME - EACH INCREMENTAL 1 MINUTE: Performed by: UROLOGY

## 2024-10-23 PROCEDURE — 2500000004 HC RX 250 GENERAL PHARMACY W/ HCPCS (ALT 636 FOR OP/ED): Performed by: STUDENT IN AN ORGANIZED HEALTH CARE EDUCATION/TRAINING PROGRAM

## 2024-10-23 PROCEDURE — 3600000008 HC OR TIME - EACH INCREMENTAL 1 MINUTE - PROCEDURE LEVEL THREE: Performed by: UROLOGY

## 2024-10-23 RX ORDER — FENTANYL CITRATE 50 UG/ML
INJECTION, SOLUTION INTRAMUSCULAR; INTRAVENOUS AS NEEDED
Status: DISCONTINUED | OUTPATIENT
Start: 2024-10-23 | End: 2024-10-23

## 2024-10-23 RX ORDER — SODIUM CHLORIDE, SODIUM LACTATE, POTASSIUM CHLORIDE, CALCIUM CHLORIDE 600; 310; 30; 20 MG/100ML; MG/100ML; MG/100ML; MG/100ML
100 INJECTION, SOLUTION INTRAVENOUS CONTINUOUS
Status: DISCONTINUED | OUTPATIENT
Start: 2024-10-23 | End: 2024-10-23 | Stop reason: HOSPADM

## 2024-10-23 RX ORDER — SODIUM CHLORIDE 0.9 G/100ML
IRRIGANT IRRIGATION AS NEEDED
Status: DISCONTINUED | OUTPATIENT
Start: 2024-10-23 | End: 2024-10-23 | Stop reason: HOSPADM

## 2024-10-23 RX ORDER — CEPHALEXIN 500 MG/1
500 CAPSULE ORAL 3 TIMES DAILY
Qty: 9 CAPSULE | Refills: 0 | Status: SHIPPED | OUTPATIENT
Start: 2024-10-23 | End: 2024-10-26

## 2024-10-23 RX ORDER — LIDOCAINE HYDROCHLORIDE 10 MG/ML
0.1 INJECTION, SOLUTION EPIDURAL; INFILTRATION; INTRACAUDAL; PERINEURAL ONCE
Status: DISCONTINUED | OUTPATIENT
Start: 2024-10-23 | End: 2024-10-23 | Stop reason: HOSPADM

## 2024-10-23 RX ORDER — DROPERIDOL 2.5 MG/ML
0.62 INJECTION, SOLUTION INTRAMUSCULAR; INTRAVENOUS ONCE AS NEEDED
Status: DISCONTINUED | OUTPATIENT
Start: 2024-10-23 | End: 2024-10-23 | Stop reason: HOSPADM

## 2024-10-23 RX ORDER — LABETALOL HYDROCHLORIDE 5 MG/ML
5 INJECTION, SOLUTION INTRAVENOUS ONCE AS NEEDED
Status: DISCONTINUED | OUTPATIENT
Start: 2024-10-23 | End: 2024-10-23 | Stop reason: HOSPADM

## 2024-10-23 RX ORDER — OXYCODONE HYDROCHLORIDE 5 MG/1
5 TABLET ORAL EVERY 6 HOURS PRN
Qty: 5 TABLET | Refills: 0 | Status: SHIPPED | OUTPATIENT
Start: 2024-10-23 | End: 2024-10-30

## 2024-10-23 RX ORDER — PROPOFOL 10 MG/ML
INJECTION, EMULSION INTRAVENOUS AS NEEDED
Status: DISCONTINUED | OUTPATIENT
Start: 2024-10-23 | End: 2024-10-23

## 2024-10-23 RX ORDER — ONDANSETRON HYDROCHLORIDE 2 MG/ML
INJECTION, SOLUTION INTRAVENOUS AS NEEDED
Status: DISCONTINUED | OUTPATIENT
Start: 2024-10-23 | End: 2024-10-23

## 2024-10-23 RX ORDER — CEFAZOLIN SODIUM 2 G/100ML
2 INJECTION, SOLUTION INTRAVENOUS ONCE
Status: COMPLETED | OUTPATIENT
Start: 2024-10-23 | End: 2024-10-23

## 2024-10-23 RX ORDER — LIDOCAINE HYDROCHLORIDE 20 MG/ML
INJECTION, SOLUTION INFILTRATION; PERINEURAL AS NEEDED
Status: DISCONTINUED | OUTPATIENT
Start: 2024-10-23 | End: 2024-10-23

## 2024-10-23 RX ORDER — ROCURONIUM BROMIDE 10 MG/ML
INJECTION, SOLUTION INTRAVENOUS AS NEEDED
Status: DISCONTINUED | OUTPATIENT
Start: 2024-10-23 | End: 2024-10-23

## 2024-10-23 RX ORDER — LIDOCAINE HYDROCHLORIDE 20 MG/ML
JELLY TOPICAL AS NEEDED
Status: DISCONTINUED | OUTPATIENT
Start: 2024-10-23 | End: 2024-10-23 | Stop reason: HOSPADM

## 2024-10-23 RX ORDER — PHENAZOPYRIDINE HYDROCHLORIDE 200 MG/1
200 TABLET, FILM COATED ORAL 3 TIMES DAILY PRN
Qty: 9 TABLET | Refills: 0 | Status: SHIPPED | OUTPATIENT
Start: 2024-10-23 | End: 2024-10-26

## 2024-10-23 RX ORDER — MEPERIDINE HYDROCHLORIDE 25 MG/ML
12.5 INJECTION INTRAMUSCULAR; INTRAVENOUS; SUBCUTANEOUS EVERY 10 MIN PRN
Status: DISCONTINUED | OUTPATIENT
Start: 2024-10-23 | End: 2024-10-23 | Stop reason: HOSPADM

## 2024-10-23 RX ORDER — FENTANYL CITRATE 50 UG/ML
50 INJECTION, SOLUTION INTRAMUSCULAR; INTRAVENOUS EVERY 5 MIN PRN
Status: DISCONTINUED | OUTPATIENT
Start: 2024-10-23 | End: 2024-10-23 | Stop reason: HOSPADM

## 2024-10-23 RX ORDER — OXYCODONE HYDROCHLORIDE 5 MG/1
5 TABLET ORAL EVERY 4 HOURS PRN
Status: CANCELLED | OUTPATIENT
Start: 2024-10-23

## 2024-10-23 ASSESSMENT — PAIN SCALES - GENERAL
PAINLEVEL_OUTOF10: 0 - NO PAIN
PAINLEVEL_OUTOF10: 0 - NO PAIN
PAIN_LEVEL: 0
PAINLEVEL_OUTOF10: 0 - NO PAIN

## 2024-10-23 ASSESSMENT — COLUMBIA-SUICIDE SEVERITY RATING SCALE - C-SSRS
1. IN THE PAST MONTH, HAVE YOU WISHED YOU WERE DEAD OR WISHED YOU COULD GO TO SLEEP AND NOT WAKE UP?: NO
2. HAVE YOU ACTUALLY HAD ANY THOUGHTS OF KILLING YOURSELF?: NO
6. HAVE YOU EVER DONE ANYTHING, STARTED TO DO ANYTHING, OR PREPARED TO DO ANYTHING TO END YOUR LIFE?: NO

## 2024-10-23 ASSESSMENT — PAIN - FUNCTIONAL ASSESSMENT
PAIN_FUNCTIONAL_ASSESSMENT: 0-10

## 2024-10-23 NOTE — ANESTHESIA PREPROCEDURE EVALUATION
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)   (+) Moderate late onset Alzheimer's dementia without behavioral disturbance, psychotic disturbance, mood disturbance, or anxiety (Multi)      GI   (+) Gastrointestinal hemorrhage, unspecified gastrointestinal hemorrhage type      /Renal   (+) Acute cystitis with hematuria   (+) Acute cystitis without hematuria   (+) Bilateral renal stones   (+) Malignant neoplasm of right renal pelvis   (+) Urinary tract infection      Liver   (+) Gallstones      Hematology   (+) Visit for monitoring Lovenox therapy      HEENT   (+) Glaucoma of both eyes      ID   (+) Onychomycosis   (+) Urinary tract infection       Clinical information reviewed:   Tobacco  Allergies  Meds   Med Hx  Surg Hx   Fam Hx  Soc Hx        NPO Detail:  No data recorded     Physical Exam    Airway  Mallampati: II     Cardiovascular   Rhythm: regular  Rate: normal     Dental    Pulmonary - normal exam     Abdominal - normal exam             Anesthesia Plan    History of general anesthesia?: yes  History of complications of general anesthesia?: no    ASA 2     general     intravenous induction   Postoperative administration of opioids is intended.  Anesthetic plan and risks discussed with patient.

## 2024-10-23 NOTE — DISCHARGE INSTRUCTIONS
DEPARTMENT OF UROLOGY  DISCHARGE INSTRUCTIONS TURBT/BIOPSY  Outpatient Surgery    C O N F I D E N T I A L   I N F O R M A T I O N    Jodi Nuñez      Call [phone] during regular daytime business hours (8:00 am - 5:00 pm) and after 5:00 pm and ask for the Urology resident with any questions or concerns.      If it is a life-threatening situation, proceed to the nearest emergency department.        Follow-up appointment:      Thank you for the opportunity to care for you today.  Your health and healing are very important to us.  We hope we made you feel as comfortable as possible and are committed to your recovery and continued well-being.      The following is a brief overview of your transurethral bladder tumor resection/biopsy today. Some of the information contained on this summary may be confidential.  This information should be kept in your records and should be shared with your regular doctor.    Physicians:   Dr. Gorman    Procedure performed: bladder tumor resection/biopsy  Pending results:   pathology of tissue taken from your bladder (we will go over these results at your post-operative appointment.)    What to Expect During your Recovery and Home Care  Anesthesia Side Effects   You received general anesthesia today.  You may feel sleepy, tired, or have a sore throat.   You may also feel drowsiness, dizziness, or inability to think clearly.  For your safety, do not drive, drink alcoholic beverages, take any unprescribed medication or make any important decisions for 24 hours.  A responsible adult should be with you for 24 hours.        Activity and Recovery    No heavy lifting over ten pounds x 2 weeks. Limit activity if you have a urinary catheter in place. Avoid activities that would cause pulling or tugging on your catheter.    Do not drive or operate heavy machinery while taking narcotic pain medications as these medications can alter perception, impair judgement, and slow reaction  times.      Pain Control  Unfortunately, you may experience pain after your procedure.  Adequate management can include alternative measures to help ease your pain and can include over the counter Tylenol or Ibuprofen can be taken as prescribed as needed for breakthrough pain. Do not take more than 4,000mg of Tylenol in a 24-hour period.      Percocet is a narcotic and can become addictive and may also induce constipation.  Please take Colace(prescribed or not) stool softeners when taking this medication to prevent constipation.    You may also experience bladder spasms due to the catheter. Ditropan may be prescribed to help alleviate this problem.    Nausea/Vomiting   Clear liquids are best tolerated at first. Start slow, advance your diet as tolerated to normal foods. Avoid spicy, greasy, heavy foods at first. Also, you may feel nauseous or like you need to vomit if you take any type of medication on an empty stomach.  Call your physician if you are unable to eat or drink and have persistent vomiting.    Signs of Bleeding   You are going to have some blood in your urine. Your urine will be light pink to yellow. If you have a catheter you always want to look at the urine in the tubing of your catheter and not in the large urine collection bag to check for bleeding. If urine becomes thick dark shawna red, has large clots or stops draining, please notify your physician.    Treatment/wound care:   Keep area(s) clean and dry. Clean around insertion site of catheter daily with mild soap and water.  It is okay to shower 24 hours after time of surgery.    Do not submerge your catheter in standing water until seen for follow up appointment (no tub bathing, swimming, or hot tubs).      Signs of Infection  Signs of infection can include fever, drainage(green/yellow), chills, burning sensation with passing of urine, catheter leakage, or severe abdominal pain.  If you see any of these occur, please contact your doctor's office  at [phone]. Any fever higher than 100.4, especially if associated with an ill feeling, abdominal pain, chills, or nausea should be reported to your surgeon.          Assist in bowel movements/urination  Increase fiber in diet  Increase water (6 to 8 glasses)  Increase walking   Urination should occur within 6 hours of anesthesia  If you have tried these methods and your bladder still feels full and you cannot use the bathroom, please go to your nearest Emergency room.    Additional Instructions: CATHETER CARE  Always keep the catheters tubing and drainage bag below the level of your bladder.  Avoid loops and kinks in the catheter tubing.  NOTIFY your physician if catheter falls out or catheter seems clogged and urine is not draining.   Do not wear the small leg bag to bed you should be provided with a larger overnight bag that you should wear to bed and can hang over the side of the bed.  We recommend wearing the large bag in the shower as well as this is easy to dry, and you do not get your leg straps wet from your leg bag.   Your catheter should be secured to your upper thigh, do not allow it to hang or dangle.  Your catheter will be removed at your post-operative appointment.

## 2024-10-23 NOTE — ANESTHESIA PROCEDURE NOTES
Airway  Date/Time: 10/23/2024 12:13 PM  Urgency: elective    Airway not difficult    Staffing  Performed: attending   Authorized by: Hakeem Lyle DO    Performed by: Hakeem Lyle DO  Patient location during procedure: OR    Indications and Patient Condition  Indications for airway management: anesthesia  Sedation level: deep  Preoxygenated: yes      Final Airway Details  Final airway type: endotracheal airway      Successful airway: ETT     Blade: Cerrato  Blade size: #2  ETT size (mm): 7.0  Measured from: lips  ETT to lips (cm): 22  Number of attempts at approach: 1

## 2024-10-23 NOTE — OP NOTE
Date: 10/23/2024  OR Location: [facility]    Name: Jodi Nuñez : 1940, Age: 84 y.o., MRN: [id number], Sex: female    Diagnosis  Pre-op Diagnosis      * Bladder tumor [D49.4] Post-op Diagnosis     * Bladder tumor [D49.4]     Procedures  1.  TURBT (small)    Surgeons      * Bhupendra Gorman - Primary    Resident/Fellow/Other Assistant:  Surgeons and Role:  * No surgeons found with a matching role *    Procedure Summary  Anesthesia: General  ASA: II  Anesthesia Staff:   Anesthesiologist: Hakeem Lyle DO  Estimated Blood Loss: Minimal  Intra-op Medications:   Medication Name Total Dose   sodium chloride 0.9 % irrigation solution 3,000 mL   ceFAZolin in dextrose (iso-os) (Ancef) IVPB 2 g 2 g              Anesthesia Record               Intraprocedure I/O Totals       None           Specimen:   Order Name Source Comment Collection Info Order Time   SURGICAL PATHOLOGY EXAM SOFT TISSUE MASS RESECTION Pre-op diagnosis:  Bladder tumor [D49.4] Collected By: Bhupendra Gorman MD 10/23/2024 12:28 PM       Staff:   Circulator: Chris Mayen RN  Scrub Person: Milagros Toussaint         Drains and/or Catheters: * None in log *    Tourniquet Times:         Implants:     Findings: 5 mm papillary tumor right lateral wall completely excised    Indications: Jodi Nuñez is an 84 y.o. female who is having surgery for Bladder tumor [D49.4].  Patient has a history of urothelial carcinoma.  On office cystoscopy she was found to have a small papillary recurrence on the right lateral wall.  Presents today for resection.  The risk, benefits, and alternatives were discussed with the patient.  Informed consent was obtained.    Procedure Details: Patient is brought to the operating room and placed in the supine position. General anesthesia is induced. Once she is adequately anesthetized, her legs are placed in the dorsal lithotomy position. Her genitalia prepped and draped in the usual sterile fashion.    A 26 Prydeinig  continuous-flow resectoscope sheath was passed with a visual obturator.  The bladder was inspected.  Along the right lateral wall, there was a small 5 mm papillary tumor.  We inserted the working element of the resectoscope and resected this lesion.  Spot cautery was used to obtain hemostasis.  The remainder of the bladder was inspected.  No other tumors, clots, stones, or foreign bodies were identified.  The remaining ureteral orifice was effluxing clear urine.  Bladder was drained and the resectoscope was removed.  Patient was awakened and taken to the PACU in stable condition    Complications:  None; patient tolerated the procedure well.    Disposition: PACU - hemodynamically stable.  Condition: stable       Bhupendra Gormna  Phone Number: [phone]

## 2024-10-23 NOTE — ANESTHESIA POSTPROCEDURE EVALUATION
Patient: Jodi Nuñez    Procedure Summary       Date: 10/23/24 Room / Location: [facility]    Anesthesia Start: 1206 Anesthesia Stop: 1242    Procedure: TURBT Diagnosis:       Bladder tumor      (Bladder tumor [D49.4])    Surgeons: Bhupendra Gorman MD Responsible Provider: Hakeem Lyle DO    Anesthesia Type: general ASA Status: 2            Anesthesia Type: general    Vitals Value Taken Time   /81 10/23/24 1242   Temp 36.2 10/23/24 1242   Pulse 68 10/23/24 1242   Resp 12 10/23/24 1242   SpO2 100 10/23/24 1242       Anesthesia Post Evaluation    Patient location during evaluation: bedside  Patient participation: complete - patient participated  Level of consciousness: awake and alert  Pain score: 0  Pain management: adequate  Airway patency: patent  Cardiovascular status: acceptable  Respiratory status: acceptable  Hydration status: acceptable  Postoperative Nausea and Vomiting: none        There were no known notable events for this encounter.

## 2024-10-25 ENCOUNTER — APPOINTMENT (OUTPATIENT)
Dept: UROLOGY | Facility: CLINIC | Age: 84
End: 2024-10-25
Payer: MEDICARE

## 2024-10-27 DIAGNOSIS — F41.1 GENERALIZED ANXIETY DISORDER: ICD-10-CM

## 2024-10-28 DIAGNOSIS — F41.1 GENERALIZED ANXIETY DISORDER: ICD-10-CM

## 2024-10-28 RX ORDER — SERTRALINE HYDROCHLORIDE 100 MG/1
100 TABLET, FILM COATED ORAL 2 TIMES DAILY
Qty: 60 TABLET | Refills: 0 | Status: SHIPPED | OUTPATIENT
Start: 2024-10-28

## 2024-10-28 RX ORDER — BUSPIRONE HYDROCHLORIDE 10 MG/1
10 TABLET ORAL 3 TIMES DAILY
Qty: 270 TABLET | Refills: 1 | Status: SHIPPED | OUTPATIENT
Start: 2024-10-28

## 2024-10-30 DIAGNOSIS — F41.1 GENERALIZED ANXIETY DISORDER: Primary | ICD-10-CM

## 2024-10-31 RX ORDER — ALPRAZOLAM 0.5 MG/1
0.5 TABLET ORAL 3 TIMES DAILY PRN
Qty: 90 TABLET | Refills: 0 | Status: SHIPPED | OUTPATIENT
Start: 2024-11-10 | End: 2024-12-10

## 2024-11-01 PROBLEM — G25.81 RESTLESS LEG SYNDROME: Status: RESOLVED | Noted: 2023-05-02 | Resolved: 2024-11-01

## 2024-11-01 PROBLEM — K80.20 GALLSTONES: Status: RESOLVED | Noted: 2023-05-02 | Resolved: 2024-11-01

## 2024-11-07 ENCOUNTER — APPOINTMENT (OUTPATIENT)
Dept: UROLOGY | Facility: CLINIC | Age: 84
End: 2024-11-07
Payer: MEDICARE

## 2024-11-07 LAB
LABORATORY COMMENT REPORT: NORMAL
PATH REPORT.FINAL DX SPEC: NORMAL
PATH REPORT.GROSS SPEC: NORMAL
PATH REPORT.RELEVANT HX SPEC: NORMAL
PATH REPORT.TOTAL CANCER: NORMAL
RESIDENT REVIEW: NORMAL

## 2024-11-12 ENCOUNTER — APPOINTMENT (OUTPATIENT)
Dept: UROLOGY | Facility: CLINIC | Age: 84
End: 2024-11-12
Payer: MEDICARE

## 2024-11-12 VITALS
SYSTOLIC BLOOD PRESSURE: 135 MMHG | HEART RATE: 58 BPM | BODY MASS INDEX: 23.54 KG/M2 | DIASTOLIC BLOOD PRESSURE: 70 MMHG | WEIGHT: 124.6 LBS | TEMPERATURE: 97.8 F

## 2024-11-12 DIAGNOSIS — C65.1 MALIGNANT NEOPLASM OF RIGHT RENAL PELVIS: ICD-10-CM

## 2024-11-12 PROCEDURE — 1159F MED LIST DOCD IN RCRD: CPT | Performed by: UROLOGY

## 2024-11-12 PROCEDURE — 3078F DIAST BP <80 MM HG: CPT | Performed by: UROLOGY

## 2024-11-12 PROCEDURE — 3075F SYST BP GE 130 - 139MM HG: CPT | Performed by: UROLOGY

## 2024-11-12 PROCEDURE — 99214 OFFICE O/P EST MOD 30 MIN: CPT | Performed by: UROLOGY

## 2024-11-12 PROCEDURE — G2211 COMPLEX E/M VISIT ADD ON: HCPCS | Performed by: UROLOGY

## 2024-11-12 NOTE — PROGRESS NOTES
Subjective   Patient ID: Jodi Nuñez is a 84 y.o. female who presents for pathology results. Patient is s/p TURBT on 10/23/24.    HPI  The patient has been recovering well.    Pathology Results  FINAL DIAGNOSIS   A. BLADDER TUMOR, TRANSURETHRAL RESECTION:   -- SUPERFICIAL FRAGMENTS OF ATYPICAL UROTHELIAL PROLIFERATION (SEE NOTE).  -- MUSCULARIS PROPRIA IS NOT IDENTIFIED.     NOTE: Sections show an atypical urothelial proliferation with papillary morphology and mild atypia. The differential diagnosis includes a low grade urothelial neoplasm or a reactive process.     Review of Systems  A 12 system review was completed and is negative with the exception of those signs and symptoms noted in the history of present illness.    Objective   Physical Exam  General: in NAD, appears stated age  Head: normocephalic, atraumatic  Respiratory: normal effort, no use of accessory muscles  Cardiovascular: no edema noted  Skin: normal turgor, no rashes  Neurologic: grossly intact, oriented to person/place/time  Psychiatric: mode and affect appropriate     Assessment/Plan     We discussed the pathology results. There is no need for treatment at this time. We will perform IO cystoscopy in 3 months and if that cystoscopy is negative, we will extend our surveillance to every 6 months.     Follow-up in 3 months for continued management of TCC bladder.     Scribe Attestation  By signing my name below, I, Skyler Frazier   attest that this documentation has been prepared under the direction and in the presence of Bhupendra Gorman MD.

## 2024-11-19 ENCOUNTER — APPOINTMENT (OUTPATIENT)
Dept: PRIMARY CARE | Facility: CLINIC | Age: 84
End: 2024-11-19
Payer: MEDICARE

## 2024-11-26 ENCOUNTER — LAB (OUTPATIENT)
Dept: LAB | Facility: LAB | Age: 84
End: 2024-11-26
Payer: MEDICARE

## 2024-11-26 ENCOUNTER — APPOINTMENT (OUTPATIENT)
Dept: GASTROENTEROLOGY | Facility: CLINIC | Age: 84
End: 2024-11-26
Payer: MEDICARE

## 2024-11-26 VITALS
WEIGHT: 126 LBS | SYSTOLIC BLOOD PRESSURE: 129 MMHG | HEIGHT: 61 IN | DIASTOLIC BLOOD PRESSURE: 67 MMHG | BODY MASS INDEX: 23.79 KG/M2 | HEART RATE: 64 BPM | OXYGEN SATURATION: 93 %

## 2024-11-26 DIAGNOSIS — D64.9 ANEMIA, UNSPECIFIED TYPE: ICD-10-CM

## 2024-11-26 DIAGNOSIS — K92.2 GASTROINTESTINAL HEMORRHAGE, UNSPECIFIED GASTROINTESTINAL HEMORRHAGE TYPE: Primary | ICD-10-CM

## 2024-11-26 LAB
ERYTHROCYTE [DISTWIDTH] IN BLOOD BY AUTOMATED COUNT: 17.2 % (ref 11.5–14.5)
FERRITIN SERPL-MCNC: 43 NG/ML (ref 8–150)
HCT VFR BLD AUTO: 38.4 % (ref 36–46)
HGB BLD-MCNC: 12.3 G/DL (ref 12–16)
IRON SATN MFR SERPL: 26 % (ref 25–45)
IRON SERPL-MCNC: 103 UG/DL (ref 35–150)
MCH RBC QN AUTO: 30.1 PG (ref 26–34)
MCHC RBC AUTO-ENTMCNC: 32 G/DL (ref 32–36)
MCV RBC AUTO: 94 FL (ref 80–100)
NRBC BLD-RTO: 0 /100 WBCS (ref 0–0)
PLATELET # BLD AUTO: 280 X10*3/UL (ref 150–450)
RBC # BLD AUTO: 4.09 X10*6/UL (ref 4–5.2)
TIBC SERPL-MCNC: 399 UG/DL (ref 240–445)
UIBC SERPL-MCNC: 296 UG/DL (ref 110–370)
WBC # BLD AUTO: 10.4 X10*3/UL (ref 4.4–11.3)

## 2024-11-26 PROCEDURE — 83550 IRON BINDING TEST: CPT

## 2024-11-26 PROCEDURE — 83540 ASSAY OF IRON: CPT

## 2024-11-26 PROCEDURE — 3074F SYST BP LT 130 MM HG: CPT | Performed by: INTERNAL MEDICINE

## 2024-11-26 PROCEDURE — 3078F DIAST BP <80 MM HG: CPT | Performed by: INTERNAL MEDICINE

## 2024-11-26 PROCEDURE — 85027 COMPLETE CBC AUTOMATED: CPT

## 2024-11-26 PROCEDURE — 1159F MED LIST DOCD IN RCRD: CPT | Performed by: INTERNAL MEDICINE

## 2024-11-26 PROCEDURE — 82728 ASSAY OF FERRITIN: CPT

## 2024-11-26 PROCEDURE — 36415 COLL VENOUS BLD VENIPUNCTURE: CPT

## 2024-11-26 PROCEDURE — 99214 OFFICE O/P EST MOD 30 MIN: CPT | Performed by: INTERNAL MEDICINE

## 2024-11-26 NOTE — PROGRESS NOTES
Gastroenterology Office Visit     History of Present Illness:   Jodi Nuñez is a 84 y.o. female who presents to GI clinic for follow up of anemia.  Admitted to [facility] in 8/24 for melena and anemia.  Had EGD on that admission with plan for possible outpatient colonoscopy; see below for results.      Hb decreased from 12.5 -> 11.  Discharged on PPI daily.  ASA was stopped- was taking for primary prophylaxis.  Not taking Fe.     Underwent TURBT for bladder mass in 10/24- bx was reactive process vs low grade urothelial neoplasm.     Has had 2 black stools since discharge, but none over the last 1 month.      Nephrectomy 2 yrs ago for kidney cancer.    Review of Systems  Constitutional: denies fever/ chills, night sweats, wt loss and fatigue  Respiratory: denies SOB, ENGEL  CV: denies chest pain and LE edema  Neuro: denies weakness and difficulty walking      Past Medical History   has a past medical history of Abnormal bladder cytology (05/02/2023), Abnormal EKG (05/02/2023), Abnormal vaginal bleeding (05/02/2023), Anxiety, Arthritis, Cataract, COPD (chronic obstructive pulmonary disease) (Multi), Dementia, Depression, Fractures, GERD (gastroesophageal reflux disease), Hearing aid worn, High cholesterol, Hypertension, Impacted cerumen, bilateral (01/13/2021), Other gastritis without bleeding (04/08/2021), Personal history of diseases of the skin and subcutaneous tissue (09/16/2019), Personal history of other diseases of the digestive system (01/13/2021), Personal history of other diseases of urinary system (04/21/2022), Personal history of other specified conditions (12/03/2020), Renal cancer (Multi), Tongue lesion (05/02/2023), Unspecified symptoms and signs involving the genitourinary system, Urinary tract infection, and Wears glasses.     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)    Elevated calcitonin level    Coronary artery disease involving native coronary artery of native heart without angina pectoris    Elevated coronary artery calcium score    Essential hypertension    Generalized anxiety disorder    Glaucoma of both eyes    Gross hematuria    Hyperglycemia    Mixed hyperlipidemia    Ischemic cerebrovascular disease    LVH (left ventricular hypertrophy)    Malignant neoplasm of right renal pelvis    Memory loss    Muscle cramp    Onychomycosis    Skin nodule    Tremor, essential    Urethral stricture    Vitamin D deficiency    Abdominal pain    Constipation    Tremor    Urinary tract infection    Bilateral renal stones    Skin lesion    Moderate late onset Alzheimer's dementia without behavioral disturbance, psychotic disturbance, mood disturbance, or anxiety (Multi)    Ataxia    Closed fracture of patella, initial encounter    Fall    Impaired functional mobility, balance, and endurance    Visit for monitoring Lovenox therapy    Chronic renal disease, stage IV (Multi)    Gastrointestinal hemorrhage, unspecified gastrointestinal hemorrhage type    Generalized weakness    Acute cystitis without hematuria    Acute renal failure superimposed on stage 3a chronic kidney disease (Multi)    Acute cystitis with hematuria    Major depressive disorder    Bladder tumor    Anemia       Past Surgical History  Past Surgical History:   Procedure Laterality Date    CATARACT EXTRACTION  06/10/2019    Cataract surgery    COLONOSCOPY  03/16/2022    Colonoscopy    HYSTERECTOMY  03/16/2022    Hysterectomy    NEPHRECTOMY Right 09/07/2022    Nephroureterectomy       Social History   reports that she has been smoking cigarettes. She has never been exposed to tobacco smoke. She has never used smokeless tobacco. She reports that she does not drink alcohol and does not use drugs.     Family History  family history includes  "Alzheimer's disease in her brother; CARDIAC DISORDER in her sister; Colon cancer in her father; Dementia in an other family member; Diabetes in her mother; Glaucoma in her brother; Hypertension in her brother and sister.       Allergies  Allergies   Allergen Reactions    Diphenhydramine Hcl Other     FAST HEART RATE    Fexofenadine-Pseudoephedrine Other     fast heart beat    Pseudoephedrine Other     FAST HEART RATE       Medications  Current Outpatient Medications   Medication Instructions    ALPRAZolam (XANAX) 0.5 mg, oral, 3 times daily PRN, DO NOT FILL BEFORE NOVEMBER 10 2024    amLODIPine (NORVASC) 5 mg, oral, Daily    atorvastatin (LIPITOR) 40 mg, oral, Daily    busPIRone (BUSPAR) 10 mg, oral, 3 times daily    cholecalciferol (VITAMIN D-3) 50,000 Units, oral, Once Weekly    donepezil (ARICEPT) 10 mg, oral, Nightly    losartan (COZAAR) 25 mg, oral, 2 times daily    Lumigan 0.01 % ophthalmic solution Daily RT    melatonin 10 mg capsule Take by mouth.    memantine (NAMENDA) 10 mg, oral, 2 times daily    pantoprazole (PROTONIX) 40 mg, oral, Daily    rOPINIRole (REQUIP) 0.5 mg, oral, Nightly    sertraline (ZOLOFT) 100 mg, oral, 2 times daily        Objective   Blood pressure 129/67, pulse 64, height 1.549 m (5' 1\"), weight 57.2 kg (126 lb), SpO2 93%.        LABS  Component      Latest Ref Rng 8/21/2024 8/22/2024 10/16/2024   HEMOGLOBIN      12.0 - 16.0 g/dL 11.2 (L)  11.4 (L)  11.6 (L)    HEMATOCRIT      36.0 - 46.0 % 34.4 (L)  34.7 (L)  36.2    MCV      80 - 100 fL 93  93  94       CBC reviewed, normocytic anemia    Cr 1.4, reviewed, CKD stage 3    Radiology  CT A/P 8/24: reviewed    Endoscopy    Colonoscopy 6/17 with Karly: L-sided tics; 2 polyps (path unavailable)   EGD 8/24: 4-cm HH, gastritis, duodenitis; no ulcer; bx negative for H pylori    Assessment/Plan   Jodi Nuñez is a 84 y.o. female who presents to GI clinic for melena and anemia.    Anemia- normocytic  Check CBC and iron studies today    CKD, stage " 3    Gastrointestinal hemorrhage, unspecified gastrointestinal hemorrhage type- EGD w/o clear source of melena.  Has strong FH of CRC, personal h/o colon polyps.  Last colonoscopy 2017.   1) Please schedule colonoscopy at [facility] with Gatorade/miralax prep  2) You may not eat any solid foods the ENTIRE day before the procedure.  You may have clear liquids, including water, Sprite/ginger ale, apple juice, chicken broth, Jello, tea/coffee or Gatorade.  Please do not have any red or purple liquids/ Jello. No milk or cream in your tea/coffee.   3) Take the prep as directed.   4) You will need a  for the procedure.   5) You will need to contact your cardiologist or primary provider if you are taking a blood thinner.  This may need to be stopped for the procedure.        Clifford Garcia MD       Addendum:  Hb 12.3 today, ferritin 43.

## 2024-11-26 NOTE — ASSESSMENT & PLAN NOTE
1) Please schedule colonoscopy at [facility] with Gatorade/miralax prep  2) You may not eat any solid foods the ENTIRE day before the procedure.  You may have clear liquids, including water, Sprite/ginger ale, apple juice, chicken broth, Jello, tea/coffee or Gatorade.  Please do not have any red or purple liquids/ Jello. No milk or cream in your tea/coffee.   3) Take the prep as directed.   4) You will need a  for the procedure.   5) You will need to contact your cardiologist or primary provider if you are taking a blood thinner.  This may need to be stopped for the procedure.

## 2024-12-04 NOTE — PROGRESS NOTES
"Subjective   Patient ID: Jodi Nuñez is a 84 y.o. female who presents for UTI and Anxiety.  HPI    Stop taking her requip     Anxiety stable  Medicine helpful  No suicidal ideation no psychotic or manic symptoms  Symptoms of UTI recommend further culture and treatment  Discussed with patient and daughter    Coronary disease stable  No angina    Hypertension stable no chest pain or shortness of breath    High cholesterol stable  Watch diet follow blood work    GERD stable  Prior GI hemorrhage this has resolved  No red flag symptoms    Chronic back pain stable no progression or worsening    Review of Systems   Constitutional:  Negative for activity change and fatigue.   HENT:  Negative for congestion and sore throat.    Eyes:  Negative for discharge.   Respiratory:  Negative for cough, chest tightness and shortness of breath.    Cardiovascular:  Negative for chest pain and leg swelling.   Gastrointestinal:  Negative for abdominal pain, blood in stool, constipation, diarrhea, nausea and vomiting.   Endocrine: Negative for cold intolerance and heat intolerance.   Genitourinary:  Positive for dysuria. Negative for difficulty urinating and hematuria.   Musculoskeletal:  Negative for arthralgias, back pain, gait problem, myalgias and neck pain.   Allergic/Immunologic: Negative for environmental allergies.   Neurological:  Negative for dizziness, syncope, weakness, numbness and headaches.   Hematological:  Negative for adenopathy. Does not bruise/bleed easily.   Psychiatric/Behavioral:  Negative for dysphoric mood. The patient is not nervous/anxious.    All other systems reviewed and are negative.      Objective   /67 (BP Location: Left arm, BP Cuff Size: Adult)   Pulse 58   Ht 1.549 m (5' 1\")   Wt 56.9 kg (125 lb 6.4 oz)   BMI 23.69 kg/m²    Physical Exam  Vitals and nursing note reviewed.   Constitutional:       General: She is not in acute distress.     Appearance: Normal appearance.   HENT:      Head: " Normocephalic and atraumatic.      Right Ear: Tympanic membrane, ear canal and external ear normal.      Left Ear: Tympanic membrane, ear canal and external ear normal.      Nose: Nose normal.      Mouth/Throat:      Mouth: Mucous membranes are moist.      Pharynx: Oropharynx is clear. No oropharyngeal exudate or posterior oropharyngeal erythema.   Eyes:      Extraocular Movements: Extraocular movements intact.      Conjunctiva/sclera: Conjunctivae normal.      Pupils: Pupils are equal, round, and reactive to light.   Cardiovascular:      Rate and Rhythm: Normal rate and regular rhythm.      Pulses: Normal pulses.      Heart sounds: Normal heart sounds. No murmur heard.  Pulmonary:      Effort: Pulmonary effort is normal. No respiratory distress.      Breath sounds: Normal breath sounds. No wheezing or rales.   Abdominal:      General: Abdomen is flat. Bowel sounds are normal. There is no distension.      Palpations: Abdomen is soft. There is no mass.      Tenderness: There is no abdominal tenderness.   Musculoskeletal:         General: No swelling or deformity. Normal range of motion.      Cervical back: Normal range of motion and neck supple.      Right lower leg: No edema.      Left lower leg: No edema.   Lymphadenopathy:      Cervical: No cervical adenopathy.   Skin:     General: Skin is warm and dry.      Capillary Refill: Capillary refill takes less than 2 seconds.      Findings: No lesion or rash.   Neurological:      General: No focal deficit present.      Mental Status: She is alert and oriented to person, place, and time.      Cranial Nerves: No cranial nerve deficit.      Motor: No weakness.   Psychiatric:         Mood and Affect: Mood normal.         Behavior: Behavior normal.         Thought Content: Thought content normal.         Judgment: Judgment normal.         Assessment/Plan   Problem List Items Addressed This Visit       Coronary artery disease involving native coronary artery of native heart  without angina pectoris    Essential hypertension    Generalized anxiety disorder - Primary    Relevant Medications    sertraline (Zoloft) 100 mg tablet    Mixed hyperlipidemia    Gastrointestinal hemorrhage, unspecified gastrointestinal hemorrhage type    Acute cystitis without hematuria     Other Visit Diagnoses       Chronic bilateral low back pain without sciatica        Relevant Medications    cyclobenzaprine (Flexeril) 5 mg tablet            Patient education provided.  Stay current with age appropriate health maintenance as instructed.  Appointment here or ER with new or worsening symptoms'  Keep appropriate follow-up visit.  Stay current with proper immunizations she declines discuss  D/w daughter  3 months  They declines more testing

## 2024-12-05 ENCOUNTER — TELEPHONE (OUTPATIENT)
Dept: PRIMARY CARE | Facility: CLINIC | Age: 84
End: 2024-12-05

## 2024-12-05 ENCOUNTER — OFFICE VISIT (OUTPATIENT)
Dept: PRIMARY CARE | Facility: CLINIC | Age: 84
End: 2024-12-05
Payer: MEDICARE

## 2024-12-05 VITALS
BODY MASS INDEX: 23.68 KG/M2 | WEIGHT: 125.4 LBS | DIASTOLIC BLOOD PRESSURE: 67 MMHG | HEIGHT: 61 IN | HEART RATE: 58 BPM | SYSTOLIC BLOOD PRESSURE: 128 MMHG

## 2024-12-05 DIAGNOSIS — I10 ESSENTIAL HYPERTENSION: ICD-10-CM

## 2024-12-05 DIAGNOSIS — M54.50 CHRONIC BILATERAL LOW BACK PAIN WITHOUT SCIATICA: ICD-10-CM

## 2024-12-05 DIAGNOSIS — G89.29 CHRONIC BILATERAL LOW BACK PAIN WITHOUT SCIATICA: ICD-10-CM

## 2024-12-05 DIAGNOSIS — I25.10 CORONARY ARTERY DISEASE INVOLVING NATIVE CORONARY ARTERY OF NATIVE HEART WITHOUT ANGINA PECTORIS: ICD-10-CM

## 2024-12-05 DIAGNOSIS — N30.00 ACUTE CYSTITIS WITHOUT HEMATURIA: ICD-10-CM

## 2024-12-05 DIAGNOSIS — E78.2 MIXED HYPERLIPIDEMIA: ICD-10-CM

## 2024-12-05 DIAGNOSIS — K92.2 GASTROINTESTINAL HEMORRHAGE, UNSPECIFIED GASTROINTESTINAL HEMORRHAGE TYPE: ICD-10-CM

## 2024-12-05 DIAGNOSIS — F41.1 GENERALIZED ANXIETY DISORDER: Primary | ICD-10-CM

## 2024-12-05 PROCEDURE — 3074F SYST BP LT 130 MM HG: CPT | Performed by: FAMILY MEDICINE

## 2024-12-05 PROCEDURE — 1160F RVW MEDS BY RX/DR IN RCRD: CPT | Performed by: FAMILY MEDICINE

## 2024-12-05 PROCEDURE — 1159F MED LIST DOCD IN RCRD: CPT | Performed by: FAMILY MEDICINE

## 2024-12-05 PROCEDURE — 3078F DIAST BP <80 MM HG: CPT | Performed by: FAMILY MEDICINE

## 2024-12-05 PROCEDURE — 99214 OFFICE O/P EST MOD 30 MIN: CPT | Performed by: FAMILY MEDICINE

## 2024-12-05 RX ORDER — SERTRALINE HYDROCHLORIDE 100 MG/1
100 TABLET, FILM COATED ORAL 2 TIMES DAILY
Qty: 90 TABLET | Refills: 2 | Status: SHIPPED | OUTPATIENT
Start: 2024-12-05

## 2024-12-05 RX ORDER — CYCLOBENZAPRINE HCL 5 MG
5 TABLET ORAL NIGHTLY PRN
Qty: 30 TABLET | Refills: 2 | Status: SHIPPED | OUTPATIENT
Start: 2024-12-05 | End: 2025-08-02

## 2024-12-05 RX ORDER — CEFDINIR 300 MG/1
300 CAPSULE ORAL 2 TIMES DAILY
Qty: 20 CAPSULE | Refills: 0 | Status: SHIPPED | OUTPATIENT
Start: 2024-12-05 | End: 2024-12-15

## 2024-12-05 ASSESSMENT — ENCOUNTER SYMPTOMS
ADENOPATHY: 0
DIZZINESS: 0
DYSPHORIC MOOD: 0
BRUISES/BLEEDS EASILY: 0
VOMITING: 0
FATIGUE: 0
SHORTNESS OF BREATH: 0
NERVOUS/ANXIOUS: 0
MYALGIAS: 0
DIARRHEA: 0
ARTHRALGIAS: 0
HEMATURIA: 0
NUMBNESS: 0
CHEST TIGHTNESS: 0
WEAKNESS: 0
DIFFICULTY URINATING: 0
BACK PAIN: 0
BLOOD IN STOOL: 0
HEADACHES: 0
ABDOMINAL PAIN: 0
NAUSEA: 0
CONSTIPATION: 0
DYSURIA: 1
SORE THROAT: 0
EYE DISCHARGE: 0
COUGH: 0
ACTIVITY CHANGE: 0
NECK PAIN: 0

## 2024-12-05 NOTE — TELEPHONE ENCOUNTER
Prior Authorization for cyclobenzaprine (Flexeril) 5 mg tablet  has been APPROVED.    Approval valid through 12.05.2025   Approval letter scanned into media on 12.05.2024

## 2024-12-17 ENCOUNTER — APPOINTMENT (OUTPATIENT)
Dept: PRIMARY CARE | Facility: CLINIC | Age: 84
End: 2024-12-17
Payer: MEDICARE

## 2024-12-27 ENCOUNTER — CLINICAL SUPPORT (OUTPATIENT)
Dept: PREADMISSION TESTING | Facility: HOSPITAL | Age: 84
End: 2024-12-27
Payer: MEDICARE

## 2024-12-27 VITALS — WEIGHT: 126 LBS | HEIGHT: 61 IN | BODY MASS INDEX: 23.79 KG/M2

## 2024-12-27 NOTE — PREPROCEDURE INSTRUCTIONS

## 2025-01-03 DIAGNOSIS — F41.1 GENERALIZED ANXIETY DISORDER: ICD-10-CM

## 2025-01-06 RX ORDER — ALPRAZOLAM 0.5 MG/1
0.5 TABLET ORAL 3 TIMES DAILY PRN
Qty: 90 TABLET | Refills: 0 | Status: SHIPPED | OUTPATIENT
Start: 2025-01-06

## 2025-01-06 NOTE — TELEPHONE ENCOUNTER
Rx Refill Request     Name: Jodi Nuñez  :  1940   Medication Name:  ALPRAZolam (Xanax) 0.5 mg tablet   Specific Pharmacy location:  [facility] [address] [place], [place]   Date of last appointment:  2024   Date of next appointment:  Visit date not found   Best number to reach patient:  [phone]

## 2025-01-08 ENCOUNTER — APPOINTMENT (OUTPATIENT)
Dept: GASTROENTEROLOGY | Facility: HOSPITAL | Age: 85
End: 2025-01-08
Payer: MEDICARE

## 2025-01-26 DIAGNOSIS — F41.1 GENERALIZED ANXIETY DISORDER: ICD-10-CM

## 2025-01-27 RX ORDER — ALPRAZOLAM 0.5 MG/1
0.5 TABLET ORAL 3 TIMES DAILY PRN
Qty: 90 TABLET | Refills: 0 | Status: SHIPPED | OUTPATIENT
Start: 2025-01-27

## 2025-01-27 NOTE — TELEPHONE ENCOUNTER
Rx Refill Request     Name: Jodi Nuñez  :  1940   Medication Name:  ALPRAZolam (Xanax) 0.5 mg tablet   Specific Pharmacy location:  [facility] [address] [place], [place]   Date of last appointment:  2024   Date of next appointment:  Visit date not found   Best number to reach patient:  [phone]

## 2025-02-03 ENCOUNTER — APPOINTMENT (OUTPATIENT)
Dept: NEUROLOGY | Facility: CLINIC | Age: 85
End: 2025-02-03
Payer: MEDICARE

## 2025-02-03 VITALS
HEIGHT: 61 IN | WEIGHT: 126.5 LBS | SYSTOLIC BLOOD PRESSURE: 130 MMHG | HEART RATE: 66 BPM | BODY MASS INDEX: 23.88 KG/M2 | DIASTOLIC BLOOD PRESSURE: 54 MMHG

## 2025-02-03 DIAGNOSIS — F02.A0 MILD LATE ONSET ALZHEIMER'S DEMENTIA WITHOUT BEHAVIORAL DISTURBANCE, PSYCHOTIC DISTURBANCE, MOOD DISTURBANCE, OR ANXIETY (MULTI): Primary | ICD-10-CM

## 2025-02-03 DIAGNOSIS — G30.1 MILD LATE ONSET ALZHEIMER'S DEMENTIA WITHOUT BEHAVIORAL DISTURBANCE, PSYCHOTIC DISTURBANCE, MOOD DISTURBANCE, OR ANXIETY (MULTI): Primary | ICD-10-CM

## 2025-02-03 PROCEDURE — 99214 OFFICE O/P EST MOD 30 MIN: CPT | Performed by: PSYCHIATRY & NEUROLOGY

## 2025-02-03 PROCEDURE — 1159F MED LIST DOCD IN RCRD: CPT | Performed by: PSYCHIATRY & NEUROLOGY

## 2025-02-03 PROCEDURE — 1160F RVW MEDS BY RX/DR IN RCRD: CPT | Performed by: PSYCHIATRY & NEUROLOGY

## 2025-02-03 PROCEDURE — 3078F DIAST BP <80 MM HG: CPT | Performed by: PSYCHIATRY & NEUROLOGY

## 2025-02-03 PROCEDURE — 3075F SYST BP GE 130 - 139MM HG: CPT | Performed by: PSYCHIATRY & NEUROLOGY

## 2025-02-03 ASSESSMENT — PATIENT HEALTH QUESTIONNAIRE - PHQ9
2. FEELING DOWN, DEPRESSED OR HOPELESS: NOT AT ALL
1. LITTLE INTEREST OR PLEASURE IN DOING THINGS: NOT AT ALL
SUM OF ALL RESPONSES TO PHQ9 QUESTIONS 1 & 2: 0

## 2025-02-03 ASSESSMENT — ENCOUNTER SYMPTOMS
FREQUENCY: 0
ABDOMINAL PAIN: 0
JOINT SWELLING: 0
COUGH: 0
FACIAL ASYMMETRY: 0
UNEXPECTED WEIGHT CHANGE: 0
NAUSEA: 0
ADENOPATHY: 0
ARTHRALGIAS: 0
SLEEP DISTURBANCE: 1
VOMITING: 0
NUMBNESS: 0
BRUISES/BLEEDS EASILY: 0
HALLUCINATIONS: 0
LIGHT-HEADEDNESS: 0
TROUBLE SWALLOWING: 0
HEADACHES: 0
FEVER: 0
TREMORS: 0
CONFUSION: 1
NECK PAIN: 0
EYE PAIN: 0
SHORTNESS OF BREATH: 0
DIZZINESS: 0
SINUS PRESSURE: 0
SEIZURES: 0
AGITATION: 0
NECK STIFFNESS: 0
PHOTOPHOBIA: 0
HYPERACTIVE: 0
BACK PAIN: 0
FATIGUE: 0
PALPITATIONS: 0
WEAKNESS: 0
DIFFICULTY URINATING: 0
WHEEZING: 0
SPEECH DIFFICULTY: 0
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
## 2025-02-13 ENCOUNTER — APPOINTMENT (OUTPATIENT)
Dept: UROLOGY | Facility: CLINIC | Age: 85
End: 2025-02-13
Payer: MEDICARE

## 2025-02-25 DIAGNOSIS — M54.50 CHRONIC BILATERAL LOW BACK PAIN WITHOUT SCIATICA: ICD-10-CM

## 2025-02-25 DIAGNOSIS — G89.29 CHRONIC BILATERAL LOW BACK PAIN WITHOUT SCIATICA: ICD-10-CM

## 2025-02-25 RX ORDER — CYCLOBENZAPRINE HCL 5 MG
5 TABLET ORAL NIGHTLY PRN
Qty: 30 TABLET | Refills: 2 | Status: SHIPPED | OUTPATIENT
Start: 2025-02-25 | End: 2025-10-23

## 2025-02-25 NOTE — TELEPHONE ENCOUNTER
Rx Refill Request     Name: Jodi Nuñez  :  1940   Medication Name:  flexeril 10mg   Specific Pharmacy location:  [facility]  Date of last appointment:  2024   Date of next appointment:  Visit date not found   Best number to reach patient:  [phone]     
right EHL injury

## 2025-02-26 DIAGNOSIS — F41.1 GENERALIZED ANXIETY DISORDER: ICD-10-CM

## 2025-02-26 NOTE — TELEPHONE ENCOUNTER
Rx Refill Request     Name: Jodi Nuñez  :  1940     Date of last appointment:  2024   Date of next appointment:  Visit date not found   Best number to reach patient:  [phone]

## 2025-02-27 RX ORDER — ALPRAZOLAM 0.5 MG/1
0.5 TABLET ORAL 3 TIMES DAILY PRN
Qty: 90 TABLET | Refills: 0 | OUTPATIENT
Start: 2025-02-27

## 2025-02-27 RX ORDER — BUSPIRONE HYDROCHLORIDE 10 MG/1
10 TABLET ORAL 3 TIMES DAILY
Qty: 270 TABLET | Refills: 0 | OUTPATIENT
Start: 2025-02-27

## 2025-03-18 NOTE — PROGRESS NOTES
Subjective   Patient ID: Jodi Nuñez is a 84 y.o. female who presents for Cough and Back Pain.  HPI  Patient here with daughter    Had pneumonia some weeks ago  Has recurrent cough would like a chest x-ray    Coronary disease stable no angina A-fib stable  Keep cardiac follow-up    Hypertension stable no trouble taking medicine    High cholesterol stable watch diet follow blood work  Low vitamin D recommend replace    Renal cancer bladder tumor stable keep urology evaluation    Anxiety ongoing  No suicidal ideation no psychotic or manic symptoms  Stay current with urine drug screen and CSA    Dementia stable  No progression or worsening    COPD stable  No cough or wheeze or shortness of breath    Concerned about UTI unable to provide specimen would like testing    Positive tobacco abuse recommend stop smoking she will consider    Chronic kidney disease stable  Review of Systems   Constitutional:  Negative for activity change and fatigue.   HENT:  Negative for congestion and sore throat.    Eyes:  Negative for discharge.   Respiratory:  Negative for cough, chest tightness and shortness of breath.    Cardiovascular:  Negative for chest pain and leg swelling.   Gastrointestinal:  Negative for abdominal pain, blood in stool, constipation, diarrhea, nausea and vomiting.   Endocrine: Negative for cold intolerance and heat intolerance.   Genitourinary:  Negative for difficulty urinating and hematuria.   Musculoskeletal:  Negative for arthralgias, back pain, gait problem, myalgias and neck pain.   Allergic/Immunologic: Negative for environmental allergies.   Neurological:  Negative for dizziness, syncope, weakness, numbness and headaches.   Hematological:  Negative for adenopathy. Does not bruise/bleed easily.   Psychiatric/Behavioral:  Negative for dysphoric mood. The patient is not nervous/anxious.    All other systems reviewed and are negative.      Objective   /67   Pulse 59   Temp 36.3 °C (97.3 °F)   Resp 14   " Ht 1.549 m (5' 1\")   Wt 56.7 kg (125 lb)   SpO2 95%   BMI 23.62 kg/m²    Physical Exam  Vitals and nursing note reviewed.   Constitutional:       General: She is not in acute distress.     Appearance: Normal appearance.   HENT:      Head: Normocephalic and atraumatic.      Right Ear: Tympanic membrane, ear canal and external ear normal.      Left Ear: Tympanic membrane, ear canal and external ear normal.      Nose: Nose normal.      Mouth/Throat:      Mouth: Mucous membranes are moist.      Pharynx: Oropharynx is clear. No oropharyngeal exudate or posterior oropharyngeal erythema.   Eyes:      Extraocular Movements: Extraocular movements intact.      Conjunctiva/sclera: Conjunctivae normal.      Pupils: Pupils are equal, round, and reactive to light.   Cardiovascular:      Rate and Rhythm: Normal rate and regular rhythm.      Pulses: Normal pulses.      Heart sounds: Normal heart sounds. No murmur heard.  Pulmonary:      Effort: Pulmonary effort is normal. No respiratory distress.      Breath sounds: Normal breath sounds. No wheezing or rales.   Abdominal:      General: Abdomen is flat. Bowel sounds are normal. There is no distension.      Palpations: Abdomen is soft. There is no mass.      Tenderness: There is no abdominal tenderness.   Musculoskeletal:         General: No swelling or deformity. Normal range of motion.      Cervical back: Normal range of motion and neck supple.      Right lower leg: No edema.      Left lower leg: No edema.   Lymphadenopathy:      Cervical: No cervical adenopathy.   Skin:     General: Skin is warm and dry.      Capillary Refill: Capillary refill takes less than 2 seconds.      Findings: No lesion or rash.   Neurological:      General: No focal deficit present.      Mental Status: She is alert and oriented to person, place, and time.      Cranial Nerves: No cranial nerve deficit.      Motor: No weakness.   Psychiatric:         Mood and Affect: Mood normal.         Behavior: " Behavior normal.         Thought Content: Thought content normal.         Judgment: Judgment normal.         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)    Coronary artery disease involving native coronary artery of native heart without angina pectoris - Primary    Essential hypertension    Generalized anxiety disorder    Mixed hyperlipidemia    Malignant neoplasm of right renal pelvis    Vitamin D deficiency    Acute cystitis without hematuria    Relevant Medications    cephalexin (Keflex) 500 mg capsule    Bladder tumor     Other Visit Diagnoses       Panlobular emphysema (Multi)        Relevant Orders    Follow Up In [facility] - PCP    XR chest 2 views    Tobacco abuse        Relevant Medications    nicotine (Nicoderm CQ) 14 mg/24 hr patch            Patient education provided.  Stay current with age appropriate health maintenance as instructed.  Appointment here or ER with new or worsening symptoms'  Keep appropriate follow-up visit.  Stay current with proper immunizations     Trial of NicoDerm patch  Chest x-ray  Recheck 3 months and as needed  Stay current with urine drug screen and CSA  Discussed at length with patient and daughter

## 2025-03-21 ENCOUNTER — OFFICE VISIT (OUTPATIENT)
Dept: PRIMARY CARE | Facility: CLINIC | Age: 85
End: 2025-03-21
Payer: MEDICARE

## 2025-03-21 VITALS
BODY MASS INDEX: 23.6 KG/M2 | DIASTOLIC BLOOD PRESSURE: 67 MMHG | HEART RATE: 59 BPM | OXYGEN SATURATION: 95 % | TEMPERATURE: 97.3 F | SYSTOLIC BLOOD PRESSURE: 124 MMHG | WEIGHT: 125 LBS | HEIGHT: 61 IN | RESPIRATION RATE: 14 BRPM

## 2025-03-21 DIAGNOSIS — G30.0 MODERATE EARLY ONSET ALZHEIMER'S DEMENTIA WITH ANXIETY (MULTI): ICD-10-CM

## 2025-03-21 DIAGNOSIS — C65.1 MALIGNANT NEOPLASM OF RIGHT RENAL PELVIS: ICD-10-CM

## 2025-03-21 DIAGNOSIS — I48.0 PAROXYSMAL ATRIAL FIBRILLATION (MULTI): ICD-10-CM

## 2025-03-21 DIAGNOSIS — F02.B4 MODERATE EARLY ONSET ALZHEIMER'S DEMENTIA WITH ANXIETY (MULTI): ICD-10-CM

## 2025-03-21 DIAGNOSIS — I25.10 CORONARY ARTERY DISEASE INVOLVING NATIVE CORONARY ARTERY OF NATIVE HEART WITHOUT ANGINA PECTORIS: Primary | ICD-10-CM

## 2025-03-21 DIAGNOSIS — F41.1 GENERALIZED ANXIETY DISORDER: ICD-10-CM

## 2025-03-21 DIAGNOSIS — E55.9 VITAMIN D DEFICIENCY: ICD-10-CM

## 2025-03-21 DIAGNOSIS — N30.00 ACUTE CYSTITIS WITHOUT HEMATURIA: ICD-10-CM

## 2025-03-21 DIAGNOSIS — E78.2 MIXED HYPERLIPIDEMIA: ICD-10-CM

## 2025-03-21 DIAGNOSIS — I10 ESSENTIAL HYPERTENSION: ICD-10-CM

## 2025-03-21 DIAGNOSIS — D49.4 BLADDER TUMOR: ICD-10-CM

## 2025-03-21 DIAGNOSIS — N18.32 CHRONIC KIDNEY DISEASE, STAGE 3B (MULTI): ICD-10-CM

## 2025-03-21 DIAGNOSIS — J43.1 PANLOBULAR EMPHYSEMA (MULTI): ICD-10-CM

## 2025-03-21 DIAGNOSIS — Z72.0 TOBACCO ABUSE: ICD-10-CM

## 2025-03-21 PROCEDURE — 1159F MED LIST DOCD IN RCRD: CPT | Performed by: FAMILY MEDICINE

## 2025-03-21 PROCEDURE — 3074F SYST BP LT 130 MM HG: CPT | Performed by: FAMILY MEDICINE

## 2025-03-21 PROCEDURE — 99214 OFFICE O/P EST MOD 30 MIN: CPT | Performed by: FAMILY MEDICINE

## 2025-03-21 PROCEDURE — 1160F RVW MEDS BY RX/DR IN RCRD: CPT | Performed by: FAMILY MEDICINE

## 2025-03-21 PROCEDURE — 3078F DIAST BP <80 MM HG: CPT | Performed by: FAMILY MEDICINE

## 2025-03-21 RX ORDER — ROPINIROLE 0.5 MG/1
0.5 TABLET, FILM COATED ORAL NIGHTLY
COMMUNITY
Start: 2025-02-06

## 2025-03-21 RX ORDER — CEPHALEXIN 500 MG/1
500 CAPSULE ORAL 3 TIMES DAILY
Qty: 30 CAPSULE | Refills: 0 | Status: SHIPPED | OUTPATIENT
Start: 2025-03-21 | End: 2025-03-31

## 2025-03-21 RX ORDER — IBUPROFEN 200 MG
1 TABLET ORAL EVERY 24 HOURS
Qty: 30 PATCH | Refills: 0 | Status: SHIPPED | OUTPATIENT
Start: 2025-03-21 | End: 2025-04-20

## 2025-03-21 ASSESSMENT — ENCOUNTER SYMPTOMS
NAUSEA: 0
NECK PAIN: 0
DIARRHEA: 0
NERVOUS/ANXIOUS: 0
ARTHRALGIAS: 0
ADENOPATHY: 0
CHEST TIGHTNESS: 0
SHORTNESS OF BREATH: 0
BLOOD IN STOOL: 0
DIFFICULTY URINATING: 0
DIZZINESS: 0
HEMATURIA: 0
BACK PAIN: 0
COUGH: 0
DYSPHORIC MOOD: 0
NUMBNESS: 0
BRUISES/BLEEDS EASILY: 0
FATIGUE: 0
CONSTIPATION: 0
ABDOMINAL PAIN: 0
ACTIVITY CHANGE: 0
HEADACHES: 0
EYE DISCHARGE: 0
VOMITING: 0
SORE THROAT: 0
MYALGIAS: 0
WEAKNESS: 0

## 2025-03-24 ENCOUNTER — TELEPHONE (OUTPATIENT)
Dept: PRIMARY CARE | Facility: CLINIC | Age: 85
End: 2025-03-24

## 2025-03-24 NOTE — TELEPHONE ENCOUNTER
We received a request for prior authorization on the patient's nicotine (Nicoderm CQ) 14 mg/24 hr patch  from their pharmacy. Prior authorization was submitted to insurance today. We will await their determination.

## 2025-04-01 DIAGNOSIS — F41.1 GENERALIZED ANXIETY DISORDER: ICD-10-CM

## 2025-04-01 RX ORDER — ALPRAZOLAM 0.5 MG/1
0.5 TABLET ORAL 3 TIMES DAILY PRN
Qty: 90 TABLET | Refills: 0 | Status: SHIPPED | OUTPATIENT
Start: 2025-04-01

## 2025-04-01 NOTE — TELEPHONE ENCOUNTER
Rx Refill Request     Name: Jodi Nuñez  :  1940   Medication Name:  ALPRAZolam (Xanax) 0.5 mg tablet   Specific Pharmacy location:  [facility] [address] [place], [place]   Date of last appointment:  3/21/2025   Date of next appointment:  2025   Best number to reach patient:  [phone]

## 2025-04-21 DIAGNOSIS — F41.1 GENERALIZED ANXIETY DISORDER: ICD-10-CM

## 2025-04-21 RX ORDER — ALPRAZOLAM 0.5 MG/1
0.5 TABLET ORAL 3 TIMES DAILY PRN
Qty: 90 TABLET | Refills: 0 | Status: SHIPPED | OUTPATIENT
Start: 2025-04-21

## 2025-04-21 NOTE — TELEPHONE ENCOUNTER
Rx Refill Request     UDS and CSA 2024    Name: Jodi Nuñez  :  1940   Date of last appointment:  3/21/2025   Date of next appointment:  2025   Best number to reach patient:  [phone]

## 2025-04-27 DIAGNOSIS — I10 ESSENTIAL HYPERTENSION: ICD-10-CM

## 2025-04-27 DIAGNOSIS — G25.81 RESTLESS LEGS SYNDROME: ICD-10-CM

## 2025-04-28 RX ORDER — ROPINIROLE 0.5 MG/1
0.5 TABLET, FILM COATED ORAL NIGHTLY
Qty: 90 TABLET | Refills: 3 | Status: SHIPPED | OUTPATIENT
Start: 2025-04-28

## 2025-04-28 RX ORDER — AMLODIPINE BESYLATE 5 MG/1
5 TABLET ORAL DAILY
Qty: 90 TABLET | Refills: 3 | Status: SHIPPED | OUTPATIENT
Start: 2025-04-28

## 2025-04-28 NOTE — TELEPHONE ENCOUNTER
Rx Refill Request     Name: Jodi Nuñez  :  1940   Date of last appointment:  3/21/2025   Date of next appointment:  2025   Best number to reach patient:  [phone]

## 2025-05-02 DIAGNOSIS — F41.1 GENERALIZED ANXIETY DISORDER: ICD-10-CM

## 2025-05-02 DIAGNOSIS — K21.9 GASTROESOPHAGEAL REFLUX DISEASE WITHOUT ESOPHAGITIS: ICD-10-CM

## 2025-05-02 DIAGNOSIS — E78.2 MIXED HYPERLIPIDEMIA: ICD-10-CM

## 2025-05-02 RX ORDER — BUSPIRONE HYDROCHLORIDE 10 MG/1
10 TABLET ORAL 3 TIMES DAILY
Qty: 270 TABLET | Refills: 1 | Status: SHIPPED | OUTPATIENT
Start: 2025-05-02

## 2025-05-02 RX ORDER — PANTOPRAZOLE SODIUM 40 MG/1
40 TABLET, DELAYED RELEASE ORAL DAILY
Qty: 90 TABLET | Refills: 3 | Status: SHIPPED | OUTPATIENT
Start: 2025-05-02

## 2025-05-02 RX ORDER — ATORVASTATIN CALCIUM 40 MG/1
40 TABLET, FILM COATED ORAL DAILY
Qty: 90 TABLET | Refills: 3 | Status: SHIPPED | OUTPATIENT
Start: 2025-05-02

## 2025-05-02 RX ORDER — SERTRALINE HYDROCHLORIDE 100 MG/1
100 TABLET, FILM COATED ORAL 2 TIMES DAILY
Qty: 90 TABLET | Refills: 1 | Status: SHIPPED | OUTPATIENT
Start: 2025-05-02

## 2025-05-02 NOTE — TELEPHONE ENCOUNTER
Rx Refill Request     Name: Jodi Nuñez  :  1940     Date of last appointment:  3/21/2025   Date of next appointment:  2025   Best number to reach patient:  [phone]

## 2025-05-07 ENCOUNTER — HOSPITAL ENCOUNTER (OUTPATIENT)
Dept: RADIOLOGY | Facility: HOSPITAL | Age: 85
Discharge: HOME | End: 2025-05-07
Payer: MEDICARE

## 2025-05-07 DIAGNOSIS — J43.1 PANLOBULAR EMPHYSEMA (MULTI): ICD-10-CM

## 2025-05-07 PROCEDURE — 71046 X-RAY EXAM CHEST 2 VIEWS: CPT

## 2025-05-07 PROCEDURE — 71046 X-RAY EXAM CHEST 2 VIEWS: CPT | Performed by: RADIOLOGY

## 2025-05-12 DIAGNOSIS — R06.02 SOB (SHORTNESS OF BREATH): ICD-10-CM

## 2025-05-28 ENCOUNTER — HOSPITAL ENCOUNTER (OUTPATIENT)
Dept: RADIOLOGY | Facility: HOSPITAL | Age: 85
Discharge: HOME | End: 2025-05-28
Payer: MEDICARE

## 2025-05-28 DIAGNOSIS — R06.02 SOB (SHORTNESS OF BREATH): ICD-10-CM

## 2025-05-28 PROCEDURE — 71046 X-RAY EXAM CHEST 2 VIEWS: CPT

## 2025-05-28 PROCEDURE — 71046 X-RAY EXAM CHEST 2 VIEWS: CPT | Performed by: STUDENT IN AN ORGANIZED HEALTH CARE EDUCATION/TRAINING PROGRAM

## 2025-05-30 DIAGNOSIS — R93.89 ABNORMAL CHEST X-RAY: Primary | ICD-10-CM

## 2025-06-16 ENCOUNTER — HOSPITAL ENCOUNTER (OUTPATIENT)
Dept: RADIOLOGY | Facility: HOSPITAL | Age: 85
Discharge: HOME | End: 2025-06-16
Payer: MEDICARE

## 2025-06-16 DIAGNOSIS — R93.89 ABNORMAL CHEST X-RAY: ICD-10-CM

## 2025-06-16 PROCEDURE — 71250 CT THORAX DX C-: CPT

## 2025-06-16 PROCEDURE — 71250 CT THORAX DX C-: CPT | Performed by: RADIOLOGY

## 2025-06-19 DIAGNOSIS — J18.9 PNEUMONIA DUE TO INFECTIOUS ORGANISM, UNSPECIFIED LATERALITY, UNSPECIFIED PART OF LUNG: Primary | ICD-10-CM

## 2025-06-19 RX ORDER — CEFDINIR 300 MG/1
300 CAPSULE ORAL 2 TIMES DAILY
Qty: 20 CAPSULE | Refills: 0 | Status: SHIPPED | OUTPATIENT
Start: 2025-06-19 | End: 2025-06-29

## 2025-06-19 NOTE — TELEPHONE ENCOUNTER
Name: Jodi Nuñez  :  1940   Medication Name:  omnicef 300mg   [facility]   Date of last appointment:  3/21/2025   Date of next appointment:  2025   Best number to reach patient:  [phone]

## 2025-06-25 NOTE — PROGRESS NOTES
"  Subjective   Reason for Visit: Jodi Nuñez is an 84 y.o. y.o. female here for a Medicare Wellness visit.               HPI    Patient Care Team:  Manoj Bustamante MD as PCP - General  Manoj Bustamante MD as PCP - [facility] PCP     Review of Systems    Objective   Vitals:  /82 (BP Location: Left arm, BP Cuff Size: Adult)   Pulse 59   Ht (!) 1.549 m (5' 1\")   Wt 55.1 kg (121 lb 6.4 oz)   SpO2 95%   BMI 22.94 kg/m²       Physical Exam    Assessment/Plan   Problem List Items Addressed This Visit    None  Patient education provided.  Stay current with age appropriate health maintenance as instructed.  Appointment here or ER with new or worsening symptoms'  Keep appropriate follow-up visit.  Stay current with proper immunizations   "

## 2025-06-26 ENCOUNTER — APPOINTMENT (OUTPATIENT)
Dept: PRIMARY CARE | Facility: CLINIC | Age: 85
End: 2025-06-26
Payer: MEDICARE

## 2025-06-26 VITALS
BODY MASS INDEX: 22.92 KG/M2 | SYSTOLIC BLOOD PRESSURE: 126 MMHG | OXYGEN SATURATION: 95 % | WEIGHT: 121.4 LBS | DIASTOLIC BLOOD PRESSURE: 82 MMHG | HEIGHT: 61 IN | HEART RATE: 59 BPM

## 2025-06-26 DIAGNOSIS — R73.9 HYPERGLYCEMIA: ICD-10-CM

## 2025-06-26 DIAGNOSIS — Z00.00 ROUTINE GENERAL MEDICAL EXAMINATION AT HEALTH CARE FACILITY: Primary | ICD-10-CM

## 2025-06-26 DIAGNOSIS — C65.1 MALIGNANT NEOPLASM OF RIGHT RENAL PELVIS: ICD-10-CM

## 2025-06-26 DIAGNOSIS — E78.2 MIXED HYPERLIPIDEMIA: ICD-10-CM

## 2025-06-26 DIAGNOSIS — I25.10 CORONARY ARTERY DISEASE INVOLVING NATIVE CORONARY ARTERY OF NATIVE HEART WITHOUT ANGINA PECTORIS: ICD-10-CM

## 2025-06-26 DIAGNOSIS — N18.4 CHRONIC RENAL DISEASE, STAGE IV (MULTI): ICD-10-CM

## 2025-06-26 DIAGNOSIS — E78.00 HYPERCHOLESTEROLEMIA: ICD-10-CM

## 2025-06-26 DIAGNOSIS — I48.0 PAROXYSMAL ATRIAL FIBRILLATION (MULTI): ICD-10-CM

## 2025-06-26 DIAGNOSIS — F41.1 GENERALIZED ANXIETY DISORDER: ICD-10-CM

## 2025-06-26 DIAGNOSIS — J43.1 PANLOBULAR EMPHYSEMA (MULTI): ICD-10-CM

## 2025-06-26 PROCEDURE — 99214 OFFICE O/P EST MOD 30 MIN: CPT | Performed by: FAMILY MEDICINE

## 2025-06-26 PROCEDURE — 1159F MED LIST DOCD IN RCRD: CPT | Performed by: FAMILY MEDICINE

## 2025-06-26 PROCEDURE — G0439 PPPS, SUBSEQ VISIT: HCPCS | Performed by: FAMILY MEDICINE

## 2025-06-26 PROCEDURE — 3074F SYST BP LT 130 MM HG: CPT | Performed by: FAMILY MEDICINE

## 2025-06-26 PROCEDURE — 1170F FXNL STATUS ASSESSED: CPT | Performed by: FAMILY MEDICINE

## 2025-06-26 PROCEDURE — 1160F RVW MEDS BY RX/DR IN RCRD: CPT | Performed by: FAMILY MEDICINE

## 2025-06-26 PROCEDURE — 3079F DIAST BP 80-89 MM HG: CPT | Performed by: FAMILY MEDICINE

## 2025-06-26 PROCEDURE — 1158F ADVNC CARE PLAN TLK DOCD: CPT | Performed by: FAMILY MEDICINE

## 2025-06-26 ASSESSMENT — ENCOUNTER SYMPTOMS
ACTIVITY CHANGE: 0
SORE THROAT: 0
LOSS OF SENSATION IN FEET: 0
COUGH: 0
BACK PAIN: 0
DIFFICULTY URINATING: 0
DEPRESSION: 0
WEAKNESS: 0
DIZZINESS: 0
BRUISES/BLEEDS EASILY: 0
DYSPHORIC MOOD: 0
HEADACHES: 0
EYE DISCHARGE: 0
NUMBNESS: 0
BLOOD IN STOOL: 0
NECK PAIN: 0
VOMITING: 0
NERVOUS/ANXIOUS: 0
ABDOMINAL PAIN: 0
SHORTNESS OF BREATH: 0
ADENOPATHY: 0
ARTHRALGIAS: 0
DIARRHEA: 0
CHEST TIGHTNESS: 0
HEMATURIA: 0
MYALGIAS: 0
FATIGUE: 0
OCCASIONAL FEELINGS OF UNSTEADINESS: 1
CONSTIPATION: 0
NAUSEA: 0

## 2025-06-26 ASSESSMENT — ACTIVITIES OF DAILY LIVING (ADL)
TAKING_MEDICATION: INDEPENDENT
DRESSING: INDEPENDENT
BATHING: INDEPENDENT
DOING_HOUSEWORK: INDEPENDENT
MANAGING_FINANCES: INDEPENDENT
GROCERY_SHOPPING: INDEPENDENT

## 2025-06-26 NOTE — PROGRESS NOTES
Subjective   Reason for Visit: Jodi Nuñez is an 84 y.o. female here for a Medicare Wellness visit.     Past Medical, Surgical, and Family History reviewed and updated in chart.    Reviewed all medications by prescribing practitioner or clinical pharmacist (such as prescriptions, OTCs, herbal therapies and supplements) and documented in the medical record.    HPI  Patient here with friend    Coronary disease under control at this time  No angina A-fib stable  Keep cardiac follow-up    Hypertension ongoing  Tolerates medicine  Stay current with blood work  No trouble taking medicine    High cholesterol ongoing at this time  Tolerates medicine  Watch diet follow blood work  Low vitamin D recommend replace    Renal cancer stable  Bladder tumor stable  Keep urology evaluation    Anxiety ongoing  Medicine helpful  Discussed risks and benefits  No suicidal ideation no psychotic or manic symptoms  Stay current with urine drug screen and CSA    Dementia stable  No progression or worsening    COPD stable  No cough or wheeze or shortness of breath    Positive tobacco abuse recommend stop smoking she will consider    Chronic kidney disease stable  Lifestyle modification and risk factor modification  Proper testing as well  Patient Care Team:  Manoj Bustamante MD as PCP - General  Manoj Bustamante MD as PCP - [employer] PCP  Clifford Garcia MD as Surgeon (Gastroenterology)     Review of Systems   Constitutional:  Negative for activity change and fatigue.   HENT:  Negative for congestion and sore throat.    Eyes:  Negative for discharge.   Respiratory:  Negative for cough, chest tightness and shortness of breath.    Cardiovascular:  Negative for chest pain and leg swelling.   Gastrointestinal:  Negative for abdominal pain, blood in stool, constipation, diarrhea, nausea and vomiting.   Endocrine: Negative for cold intolerance and heat intolerance.   Genitourinary:  Negative for difficulty urinating and  "hematuria.   Musculoskeletal:  Negative for arthralgias, back pain, gait problem, myalgias and neck pain.   Allergic/Immunologic: Negative for environmental allergies.   Neurological:  Negative for dizziness, syncope, weakness, numbness and headaches.   Hematological:  Negative for adenopathy. Does not bruise/bleed easily.   Psychiatric/Behavioral:  Negative for dysphoric mood. The patient is not nervous/anxious.    All other systems reviewed and are negative.      Objective   Vitals:  /82 (BP Location: Left arm, BP Cuff Size: Adult)   Pulse 59   Ht (!) 1.549 m (5' 1\")   Wt 55.1 kg (121 lb 6.4 oz)   SpO2 95%   BMI 22.94 kg/m²       Physical Exam  Vitals and nursing note reviewed.   Constitutional:       General: She is not in acute distress.     Appearance: Normal appearance.   HENT:      Head: Normocephalic and atraumatic.      Right Ear: Tympanic membrane, ear canal and external ear normal.      Left Ear: Tympanic membrane, ear canal and external ear normal.      Nose: Nose normal.      Mouth/Throat:      Mouth: Mucous membranes are moist.      Pharynx: Oropharynx is clear. No oropharyngeal exudate or posterior oropharyngeal erythema.   Eyes:      Extraocular Movements: Extraocular movements intact.      Conjunctiva/sclera: Conjunctivae normal.      Pupils: Pupils are equal, round, and reactive to light.   Cardiovascular:      Rate and Rhythm: Normal rate and regular rhythm.      Pulses: Normal pulses.      Heart sounds: Normal heart sounds. No murmur heard.  Pulmonary:      Effort: Pulmonary effort is normal. No respiratory distress.      Breath sounds: Normal breath sounds. No wheezing or rales.   Abdominal:      General: Abdomen is flat. Bowel sounds are normal. There is no distension.      Palpations: Abdomen is soft. There is no mass.      Tenderness: There is no abdominal tenderness.   Musculoskeletal:         General: No swelling or deformity. Normal range of motion.      Cervical back: Normal " range of motion and neck supple.      Right lower leg: No edema.      Left lower leg: No edema.   Lymphadenopathy:      Cervical: No cervical adenopathy.   Skin:     General: Skin is warm and dry.      Capillary Refill: Capillary refill takes less than 2 seconds.      Findings: No lesion or rash.   Neurological:      General: No focal deficit present.      Mental Status: She is alert and oriented to person, place, and time.      Cranial Nerves: No cranial nerve deficit.      Motor: No weakness.   Psychiatric:         Mood and Affect: Mood normal.         Behavior: Behavior normal.         Thought Content: Thought content normal.         Judgment: Judgment normal.         Assessment & Plan  Panlobular emphysema (Multi)    Orders:    Follow Up In [facility] - PCP    Follow Up In [facility] - PCP; Future    Routine general medical examination at health care facility    Orders:    1 Year Follow Up In [facility] - PCP - Wellness Exam; Future    Coronary artery disease involving native coronary artery of native heart without angina pectoris         Mixed hyperlipidemia    Orders:    Comprehensive Metabolic Panel; Future    Hyperglycemia         Malignant neoplasm of right renal pelvis         Generalized anxiety disorder         Hypercholesterolemia    Orders:    Lipid Panel; Future    Paroxysmal atrial fibrillation (Multi)    Orders:    CBC and Auto Differential; Future    Chronic renal disease, stage IV (Multi)    Orders:    Albumin-Creatinine Ratio, Urine Random; Future    ACP reviewed  Patient uses no chronic no narcotics  Discussed with friend  Testing as above  Recheck 3 months and as needed  Stay current with CSA and urine drug screen

## 2025-07-03 DIAGNOSIS — F41.1 GENERALIZED ANXIETY DISORDER: ICD-10-CM

## 2025-07-03 RX ORDER — ALPRAZOLAM 0.5 MG/1
0.5 TABLET ORAL 3 TIMES DAILY PRN
Qty: 90 TABLET | Refills: 0 | Status: SHIPPED | OUTPATIENT
Start: 2025-07-03

## 2025-07-03 NOTE — TELEPHONE ENCOUNTER
Rx Refill Request Telephone Encounter    Name:  Jodi Nuñez  :  [id number]  Medication Name:  ALPRAZolam (Xanax) 0.5 mg tablet     Specific Pharmacy location:  [facility]  Date of last appointment:  25  Date of next appointment:  25  Best number to reach patient:  [phone]

## 2025-07-23 DIAGNOSIS — F41.1 GENERALIZED ANXIETY DISORDER: ICD-10-CM

## 2025-07-23 RX ORDER — ALPRAZOLAM 0.5 MG/1
0.5 TABLET ORAL 3 TIMES DAILY PRN
Qty: 90 TABLET | Refills: 0 | Status: SHIPPED | OUTPATIENT
Start: 2025-07-23

## 2025-07-23 NOTE — TELEPHONE ENCOUNTER
Rx Refill Request     Name: Jodi Nuñez  :  1940   Medication Name:  ALPRAZolam (Xanax) 0.5 mg tablet   Specific Pharmacy location:  [email or web address] [address] [place], [place]   Date of last appointment:  2025   Date of next appointment:  2025   Best number to reach patient:  [phone]

## 2025-07-24 DIAGNOSIS — F02.B0 MODERATE LATE ONSET ALZHEIMER'S DEMENTIA WITHOUT BEHAVIORAL DISTURBANCE, PSYCHOTIC DISTURBANCE, MOOD DISTURBANCE, OR ANXIETY (MULTI): ICD-10-CM

## 2025-07-24 DIAGNOSIS — G30.1 MODERATE LATE ONSET ALZHEIMER'S DEMENTIA WITHOUT BEHAVIORAL DISTURBANCE, PSYCHOTIC DISTURBANCE, MOOD DISTURBANCE, OR ANXIETY (MULTI): ICD-10-CM

## 2025-07-24 DIAGNOSIS — I10 ESSENTIAL HYPERTENSION: ICD-10-CM

## 2025-07-24 NOTE — TELEPHONE ENCOUNTER
Rx Refill Request     Name: Jodi Nuñez  :  1940   Medication Name:  donepezil (Aricept) 10 mg tablet      losartan (Cozaar) 25 mg tablet   Specific Pharmacy location:  [facility] [address] [place], [place]   Date of last appointment:  2025   Date of next appointment:  2025   Best number to reach patient:  [phone]

## 2025-07-25 RX ORDER — LOSARTAN POTASSIUM 25 MG/1
25 TABLET ORAL 2 TIMES DAILY
Qty: 180 TABLET | Refills: 1 | Status: SHIPPED | OUTPATIENT
Start: 2025-07-25

## 2025-07-25 RX ORDER — DONEPEZIL HYDROCHLORIDE 10 MG/1
10 TABLET, FILM COATED ORAL NIGHTLY
Qty: 90 TABLET | Refills: 1 | Status: SHIPPED | OUTPATIENT
Start: 2025-07-25

## 2025-07-30 ENCOUNTER — APPOINTMENT (OUTPATIENT)
Dept: NEUROLOGY | Facility: CLINIC | Age: 85
End: 2025-07-30
Payer: MEDICARE

## 2025-07-30 VITALS
WEIGHT: 121 LBS | HEIGHT: 61 IN | BODY MASS INDEX: 22.84 KG/M2 | DIASTOLIC BLOOD PRESSURE: 74 MMHG | SYSTOLIC BLOOD PRESSURE: 118 MMHG | HEART RATE: 66 BPM

## 2025-07-30 DIAGNOSIS — I67.82 ISCHEMIC CEREBROVASCULAR DISEASE: ICD-10-CM

## 2025-07-30 DIAGNOSIS — G25.0 TREMOR, ESSENTIAL: ICD-10-CM

## 2025-07-30 DIAGNOSIS — G30.1 MODERATE LATE ONSET ALZHEIMER'S DEMENTIA WITHOUT BEHAVIORAL DISTURBANCE, PSYCHOTIC DISTURBANCE, MOOD DISTURBANCE, OR ANXIETY (MULTI): Primary | ICD-10-CM

## 2025-07-30 DIAGNOSIS — F02.B0 MODERATE LATE ONSET ALZHEIMER'S DEMENTIA WITHOUT BEHAVIORAL DISTURBANCE, PSYCHOTIC DISTURBANCE, MOOD DISTURBANCE, OR ANXIETY (MULTI): Primary | ICD-10-CM

## 2025-07-30 PROCEDURE — 3078F DIAST BP <80 MM HG: CPT | Performed by: PSYCHIATRY & NEUROLOGY

## 2025-07-30 PROCEDURE — 1159F MED LIST DOCD IN RCRD: CPT | Performed by: PSYCHIATRY & NEUROLOGY

## 2025-07-30 PROCEDURE — 99214 OFFICE O/P EST MOD 30 MIN: CPT | Performed by: PSYCHIATRY & NEUROLOGY

## 2025-07-30 PROCEDURE — 3074F SYST BP LT 130 MM HG: CPT | Performed by: PSYCHIATRY & NEUROLOGY

## 2025-07-30 RX ORDER — ACETAMINOPHEN AND CODEINE PHOSPHATE 300; 30 MG/1; MG/1
1 TABLET ORAL EVERY 6 HOURS PRN
COMMUNITY
Start: 2025-04-01

## 2025-07-30 RX ORDER — MEMANTINE HYDROCHLORIDE 10 MG/1
10 TABLET ORAL 2 TIMES DAILY
Qty: 180 TABLET | Refills: 3 | Status: SHIPPED | OUTPATIENT
Start: 2025-07-30

## 2025-07-30 RX ORDER — TRIAZOLAM 0.25 MG/1
TABLET ORAL
COMMUNITY
Start: 2025-04-01

## 2025-07-30 RX ORDER — DONEPEZIL HYDROCHLORIDE 10 MG/1
10 TABLET, FILM COATED ORAL NIGHTLY
Qty: 90 TABLET | Refills: 3 | Status: SHIPPED | OUTPATIENT
Start: 2025-07-30

## 2025-07-30 ASSESSMENT — PATIENT HEALTH QUESTIONNAIRE - PHQ9
2. FEELING DOWN, DEPRESSED OR HOPELESS: SEVERAL DAYS
1. LITTLE INTEREST OR PLEASURE IN DOING THINGS: NOT AT ALL
SUM OF ALL RESPONSES TO PHQ9 QUESTIONS 1 & 2: 1

## 2025-07-30 ASSESSMENT — ENCOUNTER SYMPTOMS
WHEEZING: 0
SINUS PRESSURE: 0
HEADACHES: 0
SEIZURES: 0
SHORTNESS OF BREATH: 0
ARTHRALGIAS: 0
PALPITATIONS: 0
SPEECH DIFFICULTY: 0
NUMBNESS: 0
LIGHT-HEADEDNESS: 0
JOINT SWELLING: 0
DIZZINESS: 0
COUGH: 0
FACIAL ASYMMETRY: 0
CONFUSION: 1
NECK STIFFNESS: 0
ADENOPATHY: 0
HYPERACTIVE: 0
HALLUCINATIONS: 0
PHOTOPHOBIA: 0
DIFFICULTY URINATING: 0
FATIGUE: 0
NECK PAIN: 0
BACK PAIN: 0
SLEEP DISTURBANCE: 1
ABDOMINAL PAIN: 0
FEVER: 0
EYE PAIN: 0
AGITATION: 0
VOMITING: 0
NAUSEA: 0
BRUISES/BLEEDS EASILY: 0
FREQUENCY: 0
UNEXPECTED WEIGHT CHANGE: 0
TREMORS: 0
TROUBLE SWALLOWING: 0
WEAKNESS: 1
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
## 2025-08-14 DIAGNOSIS — F41.1 GENERALIZED ANXIETY DISORDER: ICD-10-CM

## 2025-08-14 RX ORDER — SERTRALINE HYDROCHLORIDE 100 MG/1
100 TABLET, FILM COATED ORAL 2 TIMES DAILY
Qty: 180 TABLET | Refills: 1 | Status: SHIPPED | OUTPATIENT
Start: 2025-08-14

## 2025-08-14 RX ORDER — SERTRALINE HYDROCHLORIDE 100 MG/1
100 TABLET, FILM COATED ORAL 2 TIMES DAILY
Qty: 90 TABLET | Refills: 1 | OUTPATIENT
Start: 2025-08-14

## 2025-08-21 DIAGNOSIS — F41.1 GENERALIZED ANXIETY DISORDER: ICD-10-CM

## 2025-08-22 RX ORDER — ALPRAZOLAM 0.5 MG/1
0.5 TABLET ORAL 3 TIMES DAILY PRN
Qty: 90 TABLET | Refills: 0 | Status: SHIPPED | OUTPATIENT
Start: 2025-08-22

## 2025-09-29 ENCOUNTER — APPOINTMENT (OUTPATIENT)
Dept: PRIMARY CARE | Facility: CLINIC | Age: 85
End: 2025-09-29
Payer: MEDICARE

## 2026-06-26 ENCOUNTER — APPOINTMENT (OUTPATIENT)
Dept: PRIMARY CARE | Facility: CLINIC | Age: 86
End: 2026-06-26
Payer: MEDICARE

## 2026-07-29 ENCOUNTER — APPOINTMENT (OUTPATIENT)
Dept: NEUROLOGY | Facility: CLINIC | Age: 86
End: 2026-07-29
Payer: MEDICARE

## (undated) DEVICE — ELECTRODE, HF, ESG PLASMA LOOP-MEDIUM 30 DEG

## (undated) DEVICE — HOLSTER, ELECTROSURGERY ACCESSORY, STERILE

## (undated) DEVICE — TRAY, MINOR, SINGLE BASIN, STERILE

## (undated) DEVICE — GOWN, SURGICAL, ROYAL SILK, XL, STERILE

## (undated) DEVICE — DRAINBAG, 15.5 X 31, 2600/2800 UROVIEW, STERILE

## (undated) DEVICE — Device

## (undated) DEVICE — GLOVE, SURGICAL, PROTEXIS PI , 7.5, PF, LF

## (undated) DEVICE — TRAY, SKIN SCRUB, WET PREP, WITH 4 COMPARMENT

## (undated) DEVICE — TUBING, SUCTION, 6MM X 10, CLEAN N-COND

## (undated) DEVICE — SOLUTION, IRRIGATION, SODIUM CHLORIDE 0.9%, 1000 ML, POUR BOTTLE

## (undated) DEVICE — DRESSING, NON-ADHERENT, TELFA, OUCHLESS, 3 X 8 IN, STERILE

## (undated) DEVICE — SOLUTION, SODIUM CHLORIDE 0.9%, 3000ML, BAG

## (undated) DEVICE — SOLUTION, IRRIGATION, STERILE WATER, 1000 ML, POUR BOTTLE

## (undated) DEVICE — PROTECTOR, NERVE, ULNAR, PINK